# Patient Record
Sex: FEMALE | Race: WHITE | NOT HISPANIC OR LATINO | Employment: OTHER | URBAN - METROPOLITAN AREA
[De-identification: names, ages, dates, MRNs, and addresses within clinical notes are randomized per-mention and may not be internally consistent; named-entity substitution may affect disease eponyms.]

---

## 2017-02-28 ENCOUNTER — OFFICE VISIT (OUTPATIENT)
Dept: FAMILY MEDICINE | Facility: CLINIC | Age: 62
End: 2017-02-28
Payer: COMMERCIAL

## 2017-02-28 ENCOUNTER — RADIANT APPOINTMENT (OUTPATIENT)
Dept: GENERAL RADIOLOGY | Facility: CLINIC | Age: 62
End: 2017-02-28
Attending: FAMILY MEDICINE
Payer: COMMERCIAL

## 2017-02-28 VITALS
DIASTOLIC BLOOD PRESSURE: 77 MMHG | OXYGEN SATURATION: 100 % | WEIGHT: 129 LBS | BODY MASS INDEX: 22.02 KG/M2 | HEART RATE: 79 BPM | HEIGHT: 64 IN | SYSTOLIC BLOOD PRESSURE: 126 MMHG

## 2017-02-28 DIAGNOSIS — M25.552 HIP PAIN, LEFT: Primary | ICD-10-CM

## 2017-02-28 DIAGNOSIS — Z28.21 VACCINATION NOT CARRIED OUT BECAUSE OF PATIENT REFUSAL: ICD-10-CM

## 2017-02-28 DIAGNOSIS — M25.552 HIP PAIN, LEFT: ICD-10-CM

## 2017-02-28 PROCEDURE — 73502 X-RAY EXAM HIP UNI 2-3 VIEWS: CPT

## 2017-02-28 PROCEDURE — 72100 X-RAY EXAM L-S SPINE 2/3 VWS: CPT

## 2017-02-28 PROCEDURE — 99214 OFFICE O/P EST MOD 30 MIN: CPT | Performed by: FAMILY MEDICINE

## 2017-02-28 RX ORDER — NABUMETONE 500 MG/1
1000 TABLET, FILM COATED ORAL
Qty: 60 TABLET | Refills: 1 | Status: SHIPPED | OUTPATIENT
Start: 2017-02-28 | End: 2020-05-20

## 2017-02-28 ASSESSMENT — PAIN SCALES - GENERAL: PAINLEVEL: SEVERE PAIN (7)

## 2017-02-28 NOTE — LETTER
[address] [address]  [phone]             March 1, 2017    Jhoana Benjamin Stemm  [address]  [facility] [address]            Ms. Hernandez,     The radiologist interpreted this differently than I did.  If your hip/thigh pain does not improve much with physical therapy, go ahead and meet with the spine specialist. Enclosed are the results.  Results for orders placed or performed in visit on 02/28/17   XR Lumbar Spine 2/3 Views    Narrative    XR LUMBAR SPINE 2-3 VIEWS 2/28/2017 11:39 AM    COMPARISON: 5/13/2013    HISTORY: Hip pain.      Impression    IMPRESSION: Vertebral heights are preserved without evidence of  fracture. Trace retrolisthesis at L2-3 and L3-4, unchanged. Moderate  disc space loss at L2-3, appear slightly worsened since 5/13/2013.    TAHIR ADORNO       Please contact the clinic if you have additional questions.  Thank you.     Sincerely,       Nelson Camp MD/rosalia

## 2017-02-28 NOTE — NURSING NOTE
"Chief Complaint   Patient presents with     Pain     left hip       Initial /77 (BP Location: Left arm, Patient Position: Chair, Cuff Size: Adult Regular)  Pulse 79  Ht 5' 4\" (1.626 m)  Wt 129 lb (58.5 kg)  SpO2 100%  Breastfeeding? No  BMI 22.14 kg/m2 Estimated body mass index is 22.14 kg/(m^2) as calculated from the following:    Height as of this encounter: 5' 4\" (1.626 m).    Weight as of this encounter: 129 lb (58.5 kg).  Medication Reconciliation: complete   NATASHA Roy MA        "

## 2017-02-28 NOTE — PROGRESS NOTES
SUBJECTIVE:                                                    Jhoana Hernandez is a 62 year old female who presents to clinic today for the following health issues:    Joint Pain     Onset: about 3 weeks    Description:   Location: left hip  Character: Dull ache (constant), some nerve pain (comes and goes)    Intensity: 7/10    Progression of Symptoms: intermittent    Accompanying Signs & Symptoms:  -radiation of pain to lower left leg & foot  -weakness of left leg  -numbness in foot  -Patient denies back pain       History:   Previous similar pain: YES      Precipitating factors:   Trauma or overuse: YES - Just came back from vacation where she was walking frequently. Was sitting on a plane for several hours a couple days ago while traveling home.   -Worsened by sitting, bending, twisting, turning    Alleviating factors:  Improved by: rest/inactivity, standing & walking       Therapies Tried and outcome: Relafen - helps      Past medical, family, and social histories, medications, and allergies are reviewed and updated in Epic.     ROS:  C: NEGATIVE for fever, chills, change in weight  E/M: NEGATIVE for ear, mouth and throat problems  R: NEGATIVE for significant cough or SOB  CV: NEGATIVE for chest pain, palpitations or peripheral edema  MUSCULOSKELETAL: Patient notes that her knee clicking has been improving, but still present. Patient denies pain.   ROS otherwise negative      Any history above obtained by the Medical Assistant was reviewed by Dr. Nelson Camp MD, and edited when necessary.    This document serves as a record of the services and decisions personally performed and made by Dr. Camp. It was created on his behalf by Raquel Meza, a trained medical scribe. The creation of this document is based on the provider's statements to the medical scribe.  Raquel Meza February 28, 2017 11:12 AM   OBJECTIVE:                                                    /77 (BP Location: Left arm,  "Patient Position: Chair, Cuff Size: Adult Regular)  Pulse 79  Ht 5' 4\" (1.626 m)  Wt 129 lb (58.5 kg)  SpO2 100%  Breastfeeding? No  BMI 22.14 kg/m2  Body mass index is 22.14 kg/(m^2).  GENERAL: healthy, alert and no distress  EYES: Eyes grossly normal to inspection, PERRL and conjunctivae and sclerae normal  HENT: ear canals and TM's normal, nose and mouth without ulcers or lesions  MS: no gross musculoskeletal defects noted, no edema. Normal gait, FROM of the left hip joint, and there is no tenderness over the greater trochanter.   SKIN: no suspicious lesions or rashes  NEURO: Normal strength and tone, mentation intact and speech normal, cranial nerves 2-12 intact   PSYCH: mentation appears normal, affect normal/bright      A lumbar X-Ray was ordered. My reading of this film is negative for obvious loss of disc height or foraminal narrowing. She does have scoliosis. (No comparison films available: pending review by Radiologist.)     A left hip X-Ray was ordered. My reading of this film is negative for significant degenerative changes. (No comparison films available: pending review by Radiologist.)      ASSESSMENT/PLAN:                                                    (M25.552) Hip pain, left  (primary encounter diagnosis)  Comment: Lateral and anterior. Unclear etiology. I want her to work with physical therapy for the hip. The patient also requests a referral to a spine specialist regarding recurrent back and neck problems.  Plan: XR Lumbar Spine 2/3 Views, XR Hip Left 2-3         Views, nabumetone (RELAFEN) 500 MG tablet, [facility]         PT, HAND, AND CHIROPRACTIC REFERRAL, ORTHO          REFERRAL        Follow up if symptoms persist past 6 weeks. May need to see an orthopaedist for the hip problem.    (Z28.21) Vaccination not carried out because of patient refusal  Comment: Influenza vaccine offered but declined by the patient.   Plan:       The information in this document, created by the medical scribe " for me, accurately reflects the services I personally performed and the decisions made by me. I have reviewed and approved this document for accuracy prior to leaving the patient care area.  Nelson Cmap MD

## 2017-02-28 NOTE — MR AVS SNAPSHOT
After Visit Summary   2/28/2017    Jhoana Hernandez    MRN: [id number]           Patient Information     Date Of Birth          1955        Visit Information        Provider Department      2/28/2017 11:00 AM Nelson Camp MD [facility]        Today's Diagnoses     Hip pain, left    -  1    Vaccination not carried out because of patient refusal          Care Instructions        ================================================================================  Normal Values   Blood pressure  <140/90 for most adults    <130/80 for some chronic diseases (ask your care team about yours)    BMI (body mass index)  18.5-25 kg/m2 (based on height and weight)     Thank you for visiting [facility]    Normal or non-critical lab and imaging results will be communicated to you by MyChart, letter or phone within 7 days.  If you do not hear from us within 10 days, please call the clinic. If you have a critical or abnormal lab result, we will notify you by phone as soon as possible.     If you have any questions regarding your visit please contact:     Team Comfort:   Clinic Hours Telephone Number   Dr. Nelson Burch 7am-5pm  Monday - Friday [phone]  Jacobo Do RN   Pharmacy 8:00am-8pm Monday-Friday    9am-5pm Saturday-Sunday [phone]   Urgent Care 11am-9pm Monday-Friday        9am-5pm Saturday-Sunday [phone]     After hours, weekend or if you need to make an appointment with your primary provider please call [phone].   After Hours nurse advise: call [facility]: [phone]    Medication Refills:  Call your pharmacy and they will forward the refill to us. Please allow 3 business days for your refills to be completed.                Follow-ups after your visit        Additional Services     [facility] PT, HAND, AND CHIROPRACTIC REFERRAL       **This order  will print in the [facility] Scheduling Office**    Physical Therapy, Hand Therapy and Chiropractic Care are available through:    *[facility]  *[facility]  *[facility]    Call one number to schedule at any of the above locations: [phone].    Your provider has referred you to: Physical Therapy at [facility] or [facility]    Indication/Reason for Referral: Hip Pain  Onset of Illness: 3 weeks ago  Therapy Orders: Evaluate and Treat  Special Programs: None  Special Request: Equipment: As Indicated, Modalities: As Indicated.    Additional Comments for the Therapist or Chiropractor:     Please be aware that coverage of these services is subject to the terms and limitations of your health insurance plan.  Call member services at your health plan with any benefit or coverage questions.      Please bring the following to your appointment:    *Your personal calendar for scheduling future appointments  *Comfortable clothing                  Who to contact     If you have questions or need follow up information about today's clinic visit or your schedule please contact [facility] directly at [phone].  Normal or non-critical lab and imaging results will be communicated to you by MyChart, letter or phone within 4 business days after the clinic has received the results. If you do not hear from us within 7 days, please contact the clinic through [employer] or phone. If you have a critical or abnormal lab result, we will notify you by phone as soon as possible.  Submit refill requests through [employer] or call your pharmacy and they will forward the refill request to us. Please allow 3 business days for your refill to be completed.          Additional Information About Your Visit        [employer] Information     [employer] lets you send messages to your doctor, view your test results, renew your prescriptions, schedule appointments and more. To sign up, go to  "[email or web address] . Click on \"Log in\" on the left side of the screen, which will take you to the Welcome page. Then click on \"Sign up Now\" on the right side of the page.     You will be asked to enter the access code listed below, as well as some personal information. Please follow the directions to create your username and password.     Your access code is: [email or web address]  Expires: 2017 11:46 AM     Your access code will  in 90 days. If you need help or a new code, please call your [facility] clinic or [phone].        Care EveryWhere ID     This is your Care EveryWhere ID. This could be used by other organizations to access your [facility] medical records  [id number]        Your Vitals Were     Pulse Height Pulse Oximetry Breastfeeding? BMI (Body Mass Index)       79 5' 4\" (1.626 m) 100% No 22.14 kg/m2        Blood Pressure from Last 3 Encounters:   17 126/77   16 133/79   10/07/16 127/82    Weight from Last 3 Encounters:   17 129 lb (58.5 kg)   16 128 lb (58.1 kg)   10/07/16 125 lb (56.7 kg)              We Performed the Following     [facility] PT, HAND, AND CHIROPRACTIC REFERRAL          Today's Medication Changes          These changes are accurate as of: 17 11:46 AM.  If you have any questions, ask your nurse or doctor.               Start taking these medicines.        Dose/Directions    nabumetone 500 MG tablet   Commonly known as:  RELAFEN   Used for:  Hip pain, left   Started by:  Nelson Camp MD        Dose:  1000 mg   Take 2 tablets (1,000 mg) by mouth daily with food for hip pain.   Quantity:  60 tablet   Refills:  1            Where to get your medicines      These medications were sent to [id number] - [place], [place] - [address]  [address] [address]     Phone:  [phone]     nabumetone 500 MG tablet                Primary Care Provider Office Phone # Fax #    Nelson Camp [phone]       " [facility] [address]  [facility] [address]        Thank you!     Thank you for choosing [facility]  for your care. Our goal is always to provide you with excellent care. Hearing back from our patients is one way we can continue to improve our services. Please take a few minutes to complete the written survey that you may receive in the mail after your visit with us. Thank you!             Your Updated Medication List - Protect others around you: Learn how to safely use, store and throw away your medicines at www.disposemymeds.org.          This list is accurate as of: 2/28/17 11:46 AM.  Always use your most recent med list.                   Brand Name Dispense Instructions for use    albuterol 108 (90 BASE) MCG/ACT Inhaler    PROAIR HFA/PROVENTIL HFA/VENTOLIN HFA    1 Inhaler    Inhale 1 puff into the lungs every 6 hours as needed for shortness of breath / dyspnea or wheezing       BENADRYL ALLERGY PO      AS NEEDED FOR ALLERGIES       CLARITIN PO      AS NEEDED FOR ALLERGIES       conjugated estrogens cream    PREMARIN    30 g    Place 1 g vaginally twice a week       EPINEPHrine 0.3 MG/0.3ML injection     2 each    Inject 0.3 mLs (0.3 mg) into the muscle once as needed       mometasone 50 MCG/ACT spray    NASONEX    1 Box    Spray 2 sprays into both nostrils daily for allergy prevention.       nabumetone 500 MG tablet    RELAFEN    60 tablet    Take 2 tablets (1,000 mg) by mouth daily with food for hip pain.       prednisoLONE acetate 1 % ophthalmic susp    PRED FORTE    1 Bottle    4 drops into both ears twice weekly

## 2017-02-28 NOTE — PATIENT INSTRUCTIONS
================================================================================  Normal Values   Blood pressure  <140/90 for most adults    <130/80 for some chronic diseases (ask your care team about yours)    BMI (body mass index)  18.5-25 kg/m2 (based on height and weight)     Thank you for visiting [facility]    Normal or non-critical lab and imaging results will be communicated to you by MyChart, letter or phone within 7 days.  If you do not hear from us within 10 days, please call the clinic. If you have a critical or abnormal lab result, we will notify you by phone as soon as possible.     If you have any questions regarding your visit please contact:     Team Comfort:   Clinic Hours Telephone Number   Dr. Nelson Burch 7am-5pm  Monday - Friday [phone]  Jacobo RN  Tova Do RN   Pharmacy 8:00am-8pm Monday-Friday    9am-5pm Saturday-Sunday [phone]   Urgent Care 11am-9pm Monday-Friday        9am-5pm Saturday-Sunday [phone]     After hours, weekend or if you need to make an appointment with your primary provider please call [phone].   After Hours nurse advise: call [facility]: [phone]    Medication Refills:  Call your pharmacy and they will forward the refill to us. Please allow 3 business days for your refills to be completed.

## 2017-03-09 ENCOUNTER — OFFICE VISIT (OUTPATIENT)
Dept: ORTHOPEDICS | Facility: CLINIC | Age: 62
End: 2017-03-09
Payer: COMMERCIAL

## 2017-03-09 VITALS — OXYGEN SATURATION: 98 % | HEART RATE: 71 BPM | DIASTOLIC BLOOD PRESSURE: 80 MMHG | SYSTOLIC BLOOD PRESSURE: 130 MMHG

## 2017-03-09 DIAGNOSIS — M54.16 LUMBAR RADICULOPATHY: Primary | ICD-10-CM

## 2017-03-09 PROCEDURE — 99213 OFFICE O/P EST LOW 20 MIN: CPT | Performed by: FAMILY MEDICINE

## 2017-03-09 ASSESSMENT — PAIN SCALES - GENERAL: PAINLEVEL: SEVERE PAIN (7)

## 2017-03-09 NOTE — MR AVS SNAPSHOT
After Visit Summary   3/9/2017    Jhoana Hernandez    MRN: [id number]           Patient Information     Date Of Birth          1955        Visit Information        Provider Department      3/9/2017 9:20 AM Georgi Coto, DO [facility]        Today's Diagnoses     Lumbar radiculopathy    -  1      Care Instructions    Thanks for coming today.  Ortho/Sports Medicine Clinic  [address] [place] [address]    To schedule future appointments in Ortho Clinic, you may call [phone].    To schedule ordered imaging by your provider:   Call [facility] [phone]    To schedule an injection ordered by your provider:  Call [facility] Injection scheduling line: [phone]  MyChart available online at:  [email or web address]    Please call if any further questions or concerns ([phone]).  Clinic hours 8 am to 5 pm.    Return to clinic (call) if symptoms worsen or fail to improve.          Follow-ups after your visit        Your next 10 appointments already scheduled     Mar 10, 2017  7:30 AM CST   [facility]ELA Extremity with Feliciano Burden, PT   [facility]  )    [address]  [place] [address] [phone]            Mar 17, 2017  7:30 AM CDT   CARMELA Extremity with Feliciano Burden PT   [facility]    [address]  [place] [address] [phone]              Who to contact     If you have questions or need follow up information about today's clinic visit or your schedule please contact [facility] directly at [phone].  Normal or non-critical lab and imaging results will be communicated to you by MyChart, letter or phone within 4 business days after the clinic has received the results. If you do not hear from us within 7 days, please contact the clinic through MyChart or phone. If you  have a critical or abnormal lab result, we will notify you by phone as soon as possible.  Submit refill requests through [employer] or call your pharmacy and they will forward the refill request to us. Please allow 3 business days for your refill to be completed.          Additional Information About Your Visit        [employer] Information     [employer] is an electronic gateway that provides easy, online access to your medical records. With [employer], you can request a clinic appointment, read your test results, renew a prescription or communicate with your care team.     To sign up for [employer] visit the website at [email or web address]   You will be asked to enter the access code listed below, as well as some personal information. Please follow the directions to create your username and password.     Your access code is: [email or web address]  Expires: 2017 11:46 AM     Your access code will  in 90 days. If you need help or a new code, please contact your [facility] or call [phone] for assistance.        Care EveryWhere ID     This is your Care EveryWhere ID. This could be used by other organizations to access your [facility] medical records  [id number]        Your Vitals Were     Pulse Pulse Oximetry                71 98%           Blood Pressure from Last 3 Encounters:   17 130/80   17 126/77   16 133/79    Weight from Last 3 Encounters:   17 58.5 kg (129 lb)   16 58.1 kg (128 lb)   10/07/16 56.7 kg (125 lb)              Today, you had the following     No orders found for display       Primary Care Provider Office Phone # Fax #    Nelson Camp [phone]       [facility] [address]  [facility] [address]        Thank you!     Thank you for choosing [facility]  for your care. Our goal is always to provide you with excellent care. Hearing back from our patients is one way we can continue to  improve our services. Please take a few minutes to complete the written survey that you may receive in the mail after your visit with us. Thank you!             Your Updated Medication List - Protect others around you: Learn how to safely use, store and throw away your medicines at www.disposemymeds.org.          This list is accurate as of: 3/9/17  9:56 AM.  Always use your most recent med list.                   Brand Name Dispense Instructions for use    albuterol 108 (90 BASE) MCG/ACT Inhaler    PROAIR HFA/PROVENTIL HFA/VENTOLIN HFA    1 Inhaler    Inhale 1 puff into the lungs every 6 hours as needed for shortness of breath / dyspnea or wheezing       BENADRYL ALLERGY PO      AS NEEDED FOR ALLERGIES       CLARITIN PO      AS NEEDED FOR ALLERGIES       conjugated estrogens cream    PREMARIN    30 g    Place 1 g vaginally twice a week       EPINEPHrine 0.3 MG/0.3ML injection     2 each    Inject 0.3 mLs (0.3 mg) into the muscle once as needed       mometasone 50 MCG/ACT spray    NASONEX    1 Box    Spray 2 sprays into both nostrils daily for allergy prevention.       nabumetone 500 MG tablet    RELAFEN    60 tablet    Take 2 tablets (1,000 mg) by mouth daily with food for hip pain.       prednisoLONE acetate 1 % ophthalmic susp    PRED FORTE    1 Bottle    4 drops into both ears twice weekly

## 2017-03-09 NOTE — NURSING NOTE
"Jhoana Benjamin Sukhwinderarun's goals for this visit include: Find a solution for lumbar, left hip and leg pain.   She requests these members of her care team be copied on today's visit information: yes    PCP: Nelson Camp    Referring Provider:  No referring provider defined for this encounter.    Chief Complaint   Patient presents with     Consult     Pain x 1 month. No known injury.     Hip left     Pain goes down to her toes on the left side and would be numb and tingling.      Lumbar/SI problem     Knee left     No pain but has been \"clicking\"       Initial /80 (BP Location: Right arm, Patient Position: Chair, Cuff Size: Adult Regular)  Pulse 71  SpO2 98% Estimated body mass index is 22.14 kg/(m^2) as calculated from the following:    Height as of 2/28/17: 1.626 m (5' 4\").    Weight as of 2/28/17: 58.5 kg (129 lb).  Medication Reconciliation: complete    "

## 2017-03-09 NOTE — PROGRESS NOTES
HISTORY OF PRESENT ILLNESS  Ms. Hernandez is a pleasant 62 year old year old female who presents to clinic today with left sided hip and leg pain.  She's seen at the request of Dr. Camp.  Shakira explains that her pain started many years ago insidiously.  Left sided leg pain that radiates down to her left foot down the front of her leg.  Aching, numbness, tingling in her left leg, worse with sitting, leaning forward, standing, better with walking.  Favors the leg at times.  Hasn't been to PT yet but is set up for tomorrow morning.  Relafen has helped her somewhat.    Severity: moderate    Timing: occurs intermittently  Associated signs & symptoms: none  Previous similar pain: yes  Additional history: as documented    MEDICAL HISTORY  Patient Active Problem List   Diagnosis     CARDIOVASCULAR SCREENING; LDL GOAL LESS THAN 160     Lactose intolerance     Seasonal allergic rhinitis     Allergy, food     Advanced directives, counseling/discussion     Subclinical hypothyroidism     Osteopenia     Hollenhorst plaque, left eye       Current Outpatient Prescriptions   Medication Sig Dispense Refill     nabumetone (RELAFEN) 500 MG tablet Take 2 tablets (1,000 mg) by mouth daily with food for hip pain. 60 tablet 1     conjugated estrogens (PREMARIN) vaginal cream Place 1 g vaginally twice a week 30 g 3     EPINEPHrine (EPIPEN) 0.3 MG/0.3ML injection Inject 0.3 mLs (0.3 mg) into the muscle once as needed 2 each 3     mometasone (NASONEX) 50 MCG/ACT nasal spray Spray 2 sprays into both nostrils daily for allergy prevention. 1 Box 3     albuterol (PROAIR HFA, PROVENTIL HFA, VENTOLIN HFA) 108 (90 BASE) MCG/ACT inhaler Inhale 1 puff into the lungs every 6 hours as needed for shortness of breath / dyspnea or wheezing 1 Inhaler 0     prednisoLONE acetate (PRED FORTE) 1 % ophthalmic suspension 4 drops into both ears twice weekly 1 Bottle 5     CLARITIN OR AS NEEDED FOR ALLERGIES       BENADRYL ALLERGY OR AS NEEDED FOR ALLERGIES          Allergies   Allergen Reactions     Aloe Vera      Fish      Fluticasone      Ibuprofen Sodium      Macrodantin [Nitrofuran Derivatives]      Mineral Oil      Nuts      Seafood      Shellfish Allergy      Sulfa Drugs      Yellow Dye        Family History   Problem Relation Age of Onset     Asthma Sister      DIABETES Sister      Type II (questionable)     Breast Cancer Sister 60     Lipids Mother      CEREBROVASCULAR DISEASE Father      Thyroid Disease Other      C.A.D. No family hx of      Hypertension No family hx of      Cancer - colorectal No family hx of      Prostate Cancer No family hx of        Additional medical/Social/Surgical histories reviewed in [facility] and updated as appropriate.     REVIEW OF SYSTEMS (3/9/2017)  10 point ROS of systems including Constitutional, Eyes, Respiratory, Cardiovascular, Gastroenterology, Genitourinary, Integumentary, Musculoskeletal, Psychiatric were all negative except for pertinent positives noted in my HPI.     PHYSICAL EXAM  Vitals:    03/09/17 0926   BP: 130/80   BP Location: Right arm   Patient Position: Chair   Cuff Size: Adult Regular   Pulse: 71   SpO2: 98%     General  - normal appearance, in no obvious distress  CV  - normal peripheral perfusion  Pulm  - normal respiratory pattern, non-labored  Musculoskeletal - lumbar spine  - stance: normal gait without limp, no obvious leg length discrepancy, normal heel and toe walk  - inspection: normal bone and joint alignment, no obvious scoliosis  - palpation: no paravertebral or bony tenderness  - ROM: flexion exacerbates pain, normal extension, sidebending, rotation  - strength: lower extremities 5/5 in all planes  - special tests:  (+) straight leg raise  (+) slump test  Neuro  - patellar and Achilles DTRs 2+ bilaterally, lower extremity sensory deficit throughout L5 distribution, grossly normal coordination, normal muscle tone  Skin  - no ecchymosis, erythema, warmth, or induration, no obvious rash  Psych  -  interactive, appropriate, normal mood and affect        ASSESSMENT & PLAN  Ms. Hernandez is a 62 year old year old female who is in the office today with lumbar radiculopathy.    I reviewed her lumbar xray in the room with her which reads:    IMPRESSION: Vertebral heights are preserved without evidence of  fracture. Trace retrolisthesis at L2-3 and L3-4, unchanged. Moderate  disc space loss at L2-3, appear slightly worsened since 5/13/2013.    Jhoana and I had a good discussion regarding non-operative treatment for degenerative disc disease.  This included strengthening of the paraspinal and core muscles, weight control, and oral analgesics when needed.  We also discussed the role of epidural corticosteroid injections.    If no better after PT we could consider an MR in attempt to facilitate a possible lumbar epidural spinal injection.    Jhoana will follow up in 4-6 weeks.    It was a pleasure taking care of Jhoana.        Georgi Coto DO, CAM

## 2017-03-10 ENCOUNTER — THERAPY VISIT (OUTPATIENT)
Dept: PHYSICAL THERAPY | Facility: CLINIC | Age: 62
End: 2017-03-10
Payer: COMMERCIAL

## 2017-03-10 DIAGNOSIS — M54.16 LUMBAR RADICULAR PAIN: ICD-10-CM

## 2017-03-10 DIAGNOSIS — M25.552 HIP PAIN, LEFT: Primary | ICD-10-CM

## 2017-03-10 PROCEDURE — 97110 THERAPEUTIC EXERCISES: CPT | Mod: GP | Performed by: PHYSICAL THERAPIST

## 2017-03-10 PROCEDURE — 97112 NEUROMUSCULAR REEDUCATION: CPT | Mod: GP | Performed by: PHYSICAL THERAPIST

## 2017-03-10 PROCEDURE — 97161 PT EVAL LOW COMPLEX 20 MIN: CPT | Mod: GP | Performed by: PHYSICAL THERAPIST

## 2017-03-10 ASSESSMENT — ACTIVITIES OF DAILY LIVING (ADL)
LIGHT_TO_MODERATE_WORK: MODERATE DIFFICULTY
HOS_ADL_ITEM_SCORE_TOTAL: 35
HOS_ADL_HIGHEST_POTENTIAL_SCORE: 64
WALKING_UP_STEEP_HILLS: MODERATE DIFFICULTY
RECREATIONAL_ACTIVITIES: MODERATE DIFFICULTY
HEAVY_WORK: MODERATE DIFFICULTY
PUTTING_ON_SOCKS_AND_SHOES: SLIGHT DIFFICULTY
GOING_DOWN_1_FLIGHT_OF_STAIRS: MODERATE DIFFICULTY
HOS_ADL_SCORE(%): 54.69
GETTING_INTO_AND_OUT_OF_AN_AVERAGE_CAR: SLIGHT DIFFICULTY
SITTING_FOR_15_MINUTES: MODERATE DIFFICULTY
HOS_ADL_COUNT: 16
WALKING_INITIALLY: MODERATE DIFFICULTY
STANDING_FOR_15_MINUTES: MODERATE DIFFICULTY
WALKING_15_MINUTES_OR_GREATER: MODERATE DIFFICULTY
TWISTING/PIVOTING_ON_INVOLVED_LEG: MODERATE DIFFICULTY
DEEP_SQUATTING: MODERATE DIFFICULTY
STEPPING_UP_AND_DOWN_CURBS: SLIGHT DIFFICULTY
GETTING_INTO_AND_OUT_OF_A_BATHTUB: SLIGHT DIFFICULTY
HOW_WOULD_YOU_RATE_YOUR_CURRENT_LEVEL_OF_FUNCTION_DURING_YOUR_USUAL_ACTIVITIES_OF_DAILY_LIVING_FROM_0_TO_100_WITH_100_BEING_YOUR_LEVEL_OF_FUNCTION_PRIOR_TO_YOUR_HIP_PROBLEM_AND_0_BEING_THE_INABILITY_TO_PERFORM_ANY_OF_YOUR_USUAL_DAILY_ACTIVITIES?: 70
WALKING_APPROXIMATELY_10_MINUTES: MODERATE DIFFICULTY
ROLLING_OVER_IN_BED: MODERATE DIFFICULTY
GOING_UP_1_FLIGHT_OF_STAIRS: MODERATE DIFFICULTY

## 2017-03-10 NOTE — PROGRESS NOTES
[facility] Initial Evaluation      Subjective:    Jhoana Hernandez is a 62 year old female with a lumbar condition.    Condition occurred: for unknown reasons.  This is a recurrent, chronic and new condition  January 2017.    Patient reports pain:  Lumbar spine left.  Radiates to:  Gluteals left, lower leg left and foot left.  Pain is described as aching and is intermittent and reported as 8/10.  Associated symptoms:  Numbness and tingling. Pain is worse in the A.M. and worse during the night.  Symptoms are exacerbated by lying down, bending, lifting and sitting (LEFT SIDE LYING, STAIRS) and relieved by rest and NSAID's.  Pain course: VARIES.  Special tests:  X-ray (L HIP. ).  Previous treatment: NONE.    General health as reported by patient is good.  Past medical history: ALLERGIES.  Medical allergies: yes.  Other surgeries include:  Other.  Current medications:  Anti-inflammatory.  Current occupation is SALES.  Patient is working in normal job without restrictions.  Primary job tasks include:  Repetitive tasks, lifting and prolonged standing.    Barriers: STAIRS AT HOME.     Red flags:  Bilateral numbness (HANDS. ).                      Objective:    System         Lumbar/SI Evaluation    Lumbar Myotomes:    T12-L3 (Hip Flex):  Left: 5    Right: 5  L2-4 (Quads):  Left:  5    Right:  5  L4 (Ankle DF):  Left:  5    Right:  5  L5 (Great Toe Ext): Left: 5    Right: 5   S1 (Toe Raise):  Left: 5-    Right: 5-  Lumbar DTR's:  normal                                                                   Kari Lumbar Evaluation    Posture:  Sitting: poor        Correction of Posture: worse    Movement Loss:  Flexion (Flex): nil and pain  Extension (EXT): pain  Side Glide R (SG R): nil  Side Glide L (SG L): nil  Test Movements:  FIS: During: increases      EIS:   Repeat EIS: During: centralizing      EIL: During: centralizing and decreases    Repeat EIL: During: decreases          Conclusion:  derangement  Principle of Treatment:  Posture Correction: IN SITTING WITH TOWEL ROLL.     Extension: EIS / EIL, NEEMA.     Other: NEUTRAL SPINE, THER EX, THER ACT, NMR, MANUAL THERAPY, Tx.                                        ROS    Assessment/Plan:      Patient is a 62 year old female with lumbar complaints.    Patient has the following significant findings with corresponding treatment plan.                Diagnosis 1:  HIP PAIN LEFT, LUMBAR RADICULAR PAIN  Pain -  hot/cold therapy, US, electric stimulation, mechanical traction, manual therapy, splint/taping/bracing/orthotics, self management, education, directional preference exercise and home program  Decreased function - therapeutic activities and home program  Impaired posture - neuro re-education, therapeutic activities and home program    Therapy Evaluation Codes:   1) History comprised of:   Personal factors that impact the plan of care:      None.    Comorbidity factors that impact the plan of care are:      None.     Medications impacting care: None.  2) Examination of Body Systems comprised of:   Body structures and functions that impact the plan of care:      Lumbar spine.   Activity limitations that impact the plan of care are:      Bending, Cooking, Dressing, Lifting, Sitting, Squatting/kneeling, Stairs, Standing and Working.  3) Clinical presentation characteristics are:   Stable/Uncomplicated.  4) Decision-Making    Low complexity using standardized patient assessment instrument and/or measureable assessment of functional outcome.  Cumulative Therapy Evaluation is: Low complexity.    Previous and current functional limitations:  (See Goal Flow Sheet for this information)    Short term and Long term goals: (See Goal Flow Sheet for this information)     Communication ability:  Patient appears to be able to clearly communicate and understand verbal and written communication and follow directions correctly.  Treatment Explanation - The following has been  discussed with the patient:   RX ordered/plan of care  Anticipated outcomes  Possible risks and side effects  This patient would benefit from PT intervention to resume normal activities.   Rehab potential is fair.    Frequency:  1 X week, once daily  Duration:  for 6 weeks  Discharge Plan:  Achieve all LTG.  Independent in home treatment program.  Reach maximal therapeutic benefit.    Please refer to the daily flowsheet for treatment today, total treatment time and time spent performing 1:1 timed codes.

## 2017-03-10 NOTE — MR AVS SNAPSHOT
"              After Visit Summary   3/10/2017    Jhoana Hernandez    MRN: [id number]           Patient Information     Date Of Birth          1955        Visit Information        Provider Department      3/10/2017 7:30 AM Feliciano Burden, PT [facility]        Today's Diagnoses     Hip pain, left    -  1    Lumbar radicular pain           Follow-ups after your visit        Your next 10 appointments already scheduled     Mar 17, 2017  9:30 AM CDT   CARMELA Extremity with Feliciano Burden PT   [facility] ([facility]  )    [address]  [facility] [address]   [phone]            Mar 24, 2017  8:50 AM CDT   CARMELA Extremity with Feliciano Burden PT   [facility] ([facility]  )    [address]  [facility] [address]   [phone]              Who to contact     If you have questions or need follow up information about today's clinic visit or your schedule please contact [facility] directly at [phone].  Normal or non-critical lab and imaging results will be communicated to you by MyChart, letter or phone within 4 business days after the clinic has received the results. If you do not hear from us within 7 days, please contact the clinic through [employer] or phone. If you have a critical or abnormal lab result, we will notify you by phone as soon as possible.  Submit refill requests through [employer] or call your pharmacy and they will forward the refill request to us. Please allow 3 business days for your refill to be completed.          Additional Information About Your Visit        MyChart Information     [employer] lets you send messages to your doctor, view your test results, renew your prescriptions, schedule appointments and more. To sign up, go to [email or web address] . Click on \"Log in\" on the left side of the screen, which will " "take you to the Welcome page. Then click on \"Sign up Now\" on the right side of the page.     You will be asked to enter the access code listed below, as well as some personal information. Please follow the directions to create your username and password.     Your access code is: [email or web address]  Expires: 2017 11:46 AM     Your access code will  in 90 days. If you need help or a new code, please call your [place] clinic or [phone].        Care EveryWhere ID     This is your Care EveryWhere ID. This could be used by other organizations to access your [place] medical records  [id number]         Blood Pressure from Last 3 Encounters:   17 130/80   17 126/77   16 133/79    Weight from Last 3 Encounters:   17 58.5 kg (129 lb)   16 58.1 kg (128 lb)   10/07/16 56.7 kg (125 lb)              We Performed the Following     CARMELA Inital Eval Report     Neuromuscular Re-Education     PT Eval, Low Complexity (70932)     Therapeutic Exercises        Primary Care Provider Office Phone # Fax #    Nelson Camp [phone]       [facility] [address] [facility] [address]        Thank you!     Thank you for choosing [facility]  for your care. Our goal is always to provide you with excellent care. Hearing back from our patients is one way we can continue to improve our services. Please take a few minutes to complete the written survey that you may receive in the mail after your visit with us. Thank you!             Your Updated Medication List - Protect others around you: Learn how to safely use, store and throw away your medicines at www.disposemymeds.org.          This list is accurate as of: 3/10/17 10:14 AM.  Always use your most recent med list.                   Brand Name Dispense Instructions for use    albuterol 108 (90 BASE) MCG/ACT Inhaler    PROAIR HFA/PROVENTIL HFA/VENTOLIN HFA    1 Inhaler    Inhale 1 " puff into the lungs every 6 hours as needed for shortness of breath / dyspnea or wheezing       BENADRYL ALLERGY PO      AS NEEDED FOR ALLERGIES       CLARITIN PO      AS NEEDED FOR ALLERGIES       conjugated estrogens cream    PREMARIN    30 g    Place 1 g vaginally twice a week       EPINEPHrine 0.3 MG/0.3ML injection     2 each    Inject 0.3 mLs (0.3 mg) into the muscle once as needed       mometasone 50 MCG/ACT spray    NASONEX    1 Box    Spray 2 sprays into both nostrils daily for allergy prevention.       nabumetone 500 MG tablet    RELAFEN    60 tablet    Take 2 tablets (1,000 mg) by mouth daily with food for hip pain.       prednisoLONE acetate 1 % ophthalmic susp    PRED FORTE    1 Bottle    4 drops into both ears twice weekly

## 2017-03-17 ENCOUNTER — THERAPY VISIT (OUTPATIENT)
Dept: PHYSICAL THERAPY | Facility: CLINIC | Age: 62
End: 2017-03-17
Payer: COMMERCIAL

## 2017-03-17 DIAGNOSIS — M25.552 HIP PAIN, LEFT: ICD-10-CM

## 2017-03-17 DIAGNOSIS — M54.16 LUMBAR RADICULAR PAIN: ICD-10-CM

## 2017-03-17 PROCEDURE — 97530 THERAPEUTIC ACTIVITIES: CPT | Mod: GP

## 2017-03-17 PROCEDURE — 97110 THERAPEUTIC EXERCISES: CPT | Mod: GP

## 2017-03-17 PROCEDURE — 97140 MANUAL THERAPY 1/> REGIONS: CPT | Mod: GP

## 2017-03-24 ENCOUNTER — THERAPY VISIT (OUTPATIENT)
Dept: PHYSICAL THERAPY | Facility: CLINIC | Age: 62
End: 2017-03-24
Payer: COMMERCIAL

## 2017-03-24 DIAGNOSIS — M25.552 HIP PAIN, LEFT: ICD-10-CM

## 2017-03-24 DIAGNOSIS — M54.16 LUMBAR RADICULAR PAIN: ICD-10-CM

## 2017-03-24 PROCEDURE — 97530 THERAPEUTIC ACTIVITIES: CPT | Mod: GP

## 2017-03-24 PROCEDURE — 97110 THERAPEUTIC EXERCISES: CPT | Mod: GP

## 2017-03-30 ENCOUNTER — THERAPY VISIT (OUTPATIENT)
Dept: PHYSICAL THERAPY | Facility: CLINIC | Age: 62
End: 2017-03-30
Payer: COMMERCIAL

## 2017-03-30 DIAGNOSIS — M54.16 LUMBAR RADICULAR PAIN: ICD-10-CM

## 2017-03-30 DIAGNOSIS — M25.552 HIP PAIN, LEFT: ICD-10-CM

## 2017-03-30 PROCEDURE — 97110 THERAPEUTIC EXERCISES: CPT | Mod: GP | Performed by: PHYSICAL THERAPIST

## 2017-03-30 PROCEDURE — 97140 MANUAL THERAPY 1/> REGIONS: CPT | Mod: GP | Performed by: PHYSICAL THERAPIST

## 2017-04-07 ENCOUNTER — THERAPY VISIT (OUTPATIENT)
Dept: PHYSICAL THERAPY | Facility: CLINIC | Age: 62
End: 2017-04-07
Payer: COMMERCIAL

## 2017-04-07 DIAGNOSIS — M25.552 HIP PAIN, LEFT: ICD-10-CM

## 2017-04-07 DIAGNOSIS — M54.16 LUMBAR RADICULAR PAIN: ICD-10-CM

## 2017-04-07 PROCEDURE — 97112 NEUROMUSCULAR REEDUCATION: CPT | Mod: GP | Performed by: PHYSICAL THERAPIST

## 2017-04-07 PROCEDURE — 97530 THERAPEUTIC ACTIVITIES: CPT | Mod: GP | Performed by: PHYSICAL THERAPIST

## 2017-04-07 PROCEDURE — 97110 THERAPEUTIC EXERCISES: CPT | Mod: GP | Performed by: PHYSICAL THERAPIST

## 2017-04-24 ENCOUNTER — THERAPY VISIT (OUTPATIENT)
Dept: PHYSICAL THERAPY | Facility: CLINIC | Age: 62
End: 2017-04-24
Payer: COMMERCIAL

## 2017-04-24 DIAGNOSIS — M54.16 LUMBAR RADICULAR PAIN: ICD-10-CM

## 2017-04-24 DIAGNOSIS — M25.552 HIP PAIN, LEFT: ICD-10-CM

## 2017-04-24 PROCEDURE — 97110 THERAPEUTIC EXERCISES: CPT | Mod: GP | Performed by: PHYSICAL THERAPIST

## 2017-04-24 PROCEDURE — 97112 NEUROMUSCULAR REEDUCATION: CPT | Mod: GP | Performed by: PHYSICAL THERAPIST

## 2017-04-24 NOTE — MR AVS SNAPSHOT
"              After Visit Summary   2017    Jhoana Hernandez    MRN: [id number]           Patient Information     Date Of Birth          1955        Visit Information        Provider Department      2017 8:50 AM Feliciano Burden PT [facility]        Today's Diagnoses     Hip pain, left        Lumbar radicular pain           Follow-ups after your visit        Who to contact     If you have questions or need follow up information about today's clinic visit or your schedule please contact [facility] directly at [phone].  Normal or non-critical lab and imaging results will be communicated to you by MyChart, letter or phone within 4 business days after the clinic has received the results. If you do not hear from us within 7 days, please contact the clinic through [employer] or phone. If you have a critical or abnormal lab result, we will notify you by phone as soon as possible.  Submit refill requests through [employer] or call your pharmacy and they will forward the refill request to us. Please allow 3 business days for your refill to be completed.          Additional Information About Your Visit        MyChart Information     [employer] lets you send messages to your doctor, view your test results, renew your prescriptions, schedule appointments and more. To sign up, go to [email or web address] . Click on \"Log in\" on the left side of the screen, which will take you to the Welcome page. Then click on \"Sign up Now\" on the right side of the page.     You will be asked to enter the access code listed below, as well as some personal information. Please follow the directions to create your username and password.     Your access code is: [email or web address]  Expires: 2017 12:46 PM     Your access code will  in 90 days. If you need help or a new code, please call your [place] clinic or [phone].        Care EveryWhere ID     This " is your Care EveryWhere ID. This could be used by other organizations to access your [facility] medical records  [id number]         Blood Pressure from Last 3 Encounters:   03/09/17 130/80   02/28/17 126/77   12/05/16 133/79    Weight from Last 3 Encounters:   02/28/17 58.5 kg (129 lb)   12/05/16 58.1 kg (128 lb)   10/07/16 56.7 kg (125 lb)              We Performed the Following     CARMELA Progress Notes Report     Neuromuscular Re-Education     Therapeutic Exercises        Primary Care Provider Office Phone # Fax #    Nelson Camp [phone]       [facility] [address] [place]  [facility] [address]        Thank you!     Thank you for choosing [facility]  for your care. Our goal is always to provide you with excellent care. Hearing back from our patients is one way we can continue to improve our services. Please take a few minutes to complete the written survey that you may receive in the mail after your visit with us. Thank you!             Your Updated Medication List - Protect others around you: Learn how to safely use, store and throw away your medicines at www.disposemymeds.org.          This list is accurate as of: 4/24/17 11:59 PM.  Always use your most recent med list.                   Brand Name Dispense Instructions for use    albuterol 108 (90 BASE) MCG/ACT Inhaler    PROAIR HFA/PROVENTIL HFA/VENTOLIN HFA    1 Inhaler    Inhale 1 puff into the lungs every 6 hours as needed for shortness of breath / dyspnea or wheezing       BENADRYL ALLERGY PO      AS NEEDED FOR ALLERGIES       CLARITIN PO      AS NEEDED FOR ALLERGIES       conjugated estrogens cream    PREMARIN    30 g    Place 1 g vaginally twice a week       EPINEPHrine 0.3 MG/0.3ML injection     2 each    Inject 0.3 mLs (0.3 mg) into the muscle once as needed       mometasone 50 MCG/ACT spray    NASONEX    1 Box    Spray 2 sprays into both nostrils daily for allergy prevention.        nabumetone 500 MG tablet    RELAFEN    60 tablet    Take 2 tablets (1,000 mg) by mouth daily with food for hip pain.       prednisoLONE acetate 1 % ophthalmic susp    PRED FORTE    1 Bottle    4 drops into both ears twice weekly

## 2017-04-25 PROBLEM — M54.16 LUMBAR RADICULAR PAIN: Status: RESOLVED | Noted: 2017-03-10 | Resolved: 2017-04-25

## 2017-04-25 PROBLEM — M25.552 HIP PAIN, LEFT: Status: RESOLVED | Noted: 2017-03-10 | Resolved: 2017-04-25

## 2017-04-25 NOTE — PROGRESS NOTES
Subjective:    HPI  Oswestry Score: 8 %                 Objective:    System    Physical Exam    General     ROS    Assessment/Plan:      DISCHARGE REPORT    Progress reporting period is from 3/10/2017 to 4/24/2017.     SUBJECTIVE  Subjective: BETTER OVER-ALL. DID HAVE A DAY OF L L/E SXS 5 -7 DAYS AGO.   INTERMITTENT TINGLING IN L L/E.   80-90% BETTER THAN INITIAL.    Current Pain level: 0/10   Initial Pain level: 8/10   Changes in function: Yes, see goal flow sheet for change in function    ;   ,     The subjective and objective information are from the last SOAP note on this patient.    OBJECTIVE  Objective:  FIS: TO FLOOR PAIN FREE.  EIS: LOREE: TINGLING L L/E,   NEGATIVE SLR AND SLUMP TEST.   NEEMA: TINGLING L L/E,   REIL: TINGLING L L/E.   RFIL: NEGATIVE.       ASSESSMENT/PLAN  Updated problem list and treatment plan: Diagnosis 1:  HIP PAIN LEFT, LUMBAR RADICULAR PAIN  Pain -  hot/cold therapy, US, electric stimulation, manual therapy, splint/taping/bracing/orthotics, self management, education, directional preference exercise and home program  Decreased function - therapeutic activities and home program  Impaired posture - neuro re-education, therapeutic activities and home program  STG/LTGs have been met or progress has been made towards goals:  Yes (See Goal flow sheet completed today.)  Assessment of Progress: The patient's condition is improving.  Self Management Plans:  Patient has been instructed in a home treatment program.  I have re-evaluated this patient and find that the nature, scope, duration and intensity of the therapy is appropriate for the medical condition of the patient.  Jhoana continues to require the following intervention to meet STG and LTG's: PT intervention is no longer required to meet STG/LTG.  We will discharge this patient from PT.    Recommendations:  This patient would benefit from further evaluation.    Please refer to the daily flowsheet for treatment today, total treatment time  and time spent performing 1:1 timed codes.

## 2018-02-06 ENCOUNTER — OFFICE VISIT (OUTPATIENT)
Dept: OTOLARYNGOLOGY | Facility: CLINIC | Age: 63
End: 2018-02-06
Payer: COMMERCIAL

## 2018-02-06 VITALS — TEMPERATURE: 98 F | BODY MASS INDEX: 25.1 KG/M2 | WEIGHT: 147 LBS | HEIGHT: 64 IN

## 2018-02-06 DIAGNOSIS — H60.8X3 CHRONIC ECZEMATOUS OTITIS EXTERNA OF BOTH EARS: Primary | ICD-10-CM

## 2018-02-06 DIAGNOSIS — H61.22 IMPACTED CERUMEN OF LEFT EAR: ICD-10-CM

## 2018-02-06 DIAGNOSIS — G50.1 ATYPICAL FACIAL PAIN: ICD-10-CM

## 2018-02-06 PROCEDURE — 69210 REMOVE IMPACTED EAR WAX UNI: CPT | Performed by: OTOLARYNGOLOGY

## 2018-02-06 PROCEDURE — 99214 OFFICE O/P EST MOD 30 MIN: CPT | Mod: 25 | Performed by: OTOLARYNGOLOGY

## 2018-02-06 RX ORDER — PREDNISOLONE ACETATE 10 MG/ML
SUSPENSION/ DROPS OPHTHALMIC
Qty: 1 BOTTLE | Refills: 5 | Status: SHIPPED | OUTPATIENT
Start: 2018-02-06 | End: 2019-04-26

## 2018-02-06 NOTE — PATIENT INSTRUCTIONS
Scheduling Information  To schedule your CT/MRI scan, please contact [facility] at [phone] OR [facility] at [phone]    To schedule your Surgery, please contact our Specialty Schedulers at [phone]      ENT Clinic Locations Clinic Hours Telephone Number     [facility]  [address] [facility] [place] [address]   Monday:           1:00pm -- 5:00pm    Friday:              8:00am   12:00pm   To schedule/reschedule an appointment with   Dr. Pepper,   please contact our   Specialty Scheduling Department at:     [phone]       [facility]  [address] [place]  [place] [address] Tuesday:          8:00am -- 2:00pm         Urgent Care Locations Clinic Hours Telephone Numbers     [facility]  [address] [place] [address]     Monday-Friday:     11:00am   9:00pm    Saturday-Sunday:  9:00am   5:00pm   [phone]     [facility]  [address] [place] [address]     Monday-Friday:      5:00pm - 9:00pm     Saturday-Sunday:  9:00am   5:00pm   [phone]

## 2018-02-06 NOTE — MR AVS SNAPSHOT
After Visit Summary   2/6/2018    Jhoana Hernandez    MRN: [id number]           Patient Information     Date Of Birth          1955        Visit Information        Provider Department      2/6/2018 9:00 AM Hussein Pepper MD [facility]        Today's Diagnoses     Chronic eczematous otitis externa of both ears    -  1    Impacted cerumen of left ear        Atypical facial pain          Care Instructions    Scheduling Information  To schedule your CT/MRI scan, please contact [facility] at [phone] OR [facility] at [phone]    To schedule your Surgery, please contact our Specialty Schedulers at [phone]      ENT Clinic Locations Clinic Hours Telephone Number     [facility]  [address]  [place] [address]   Monday:           1:00pm -- 5:00pm    Friday:              8:00am - 12:00pm   To schedule/reschedule an appointment with   Dr. Pepper,   please contact our   Specialty Scheduling Department at:     [phone]       [facility]  [address] [place] [address] Tuesday:          8:00am -- 2:00pm         Urgent Care Locations Clinic Hours Telephone Numbers     [facility]  [address] [place] [address]     Monday-Friday:     11:00am - 9:00pm    Saturday-Sunday:  9:00am - 5:00pm   [phone]     [facility]  [address] [place] [address]     Monday-Friday:      5:00pm - 9:00pm     Saturday-Sunday:  9:00am - 5:00pm   [phone]                 Follow-ups after your visit        Who to contact     If you have questions or need follow up information about today's clinic visit or your schedule please contact [facility] directly at [phone].  Normal or non-critical lab and imaging results will be communicated to you by MyChart, letter or phone within 4 business days after the clinic has received the results. If you do not hear from us within 7  "days, please contact the clinic through [employer] or phone. If you have a critical or abnormal lab result, we will notify you by phone as soon as possible.  Submit refill requests through [employer] or call your pharmacy and they will forward the refill request to us. Please allow 3 business days for your refill to be completed.          Additional Information About Your Visit        [employer] Information     [employer] lets you send messages to your doctor, view your test results, renew your prescriptions, schedule appointments and more. To sign up, go to [email or web address] . Click on \"Log in\" on the left side of the screen, which will take you to the Welcome page. Then click on \"Sign up Now\" on the right side of the page.     You will be asked to enter the access code listed below, as well as some personal information. Please follow the directions to create your username and password.     Your access code is: [email or web address]  Expires: 2018  9:17 AM     Your access code will  in 90 days. If you need help or a new code, please call your [place] clinic or [phone].        Care EveryWhere ID     This is your Care EveryWhere ID. This could be used by other organizations to access your [facility] medical records  [id number]        Your Vitals Were     Temperature Height BMI (Body Mass Index)             98  F (36.7  C) (Tympanic) 1.626 m (5' 4\") 25.23 kg/m2          Blood Pressure from Last 3 Encounters:   17 130/80   17 126/77   16 133/79    Weight from Last 3 Encounters:   18 66.7 kg (147 lb)   17 58.5 kg (129 lb)   16 58.1 kg (128 lb)              We Performed the Following     Remove Cerumen          Where to get your medicines      These medications were sent to [id number] - [place], [place] - [address]  [address] [facility] [address]     Phone:  [phone]     prednisoLONE acetate 1 % ophthalmic susp          Primary Care Provider Office " Phone # Fax #    Nelson Camp [phone] [address]  [facility] [address]        Equal Access to Services     PRACHI BARRERA : Hadii nora ku sgo Sosorayaali, waaxda luqadaha, qaybta kaalmada adeegyada, fabian william laWaltkaterina ortiz. So [facility] [phone].    ATENCIÓN: Si habla español, tiene a cheatham disposición servicios gratuitos de asistencia lingüística. Llame al [phone].    We comply with applicable federal civil rights laws and [place] laws. We do not discriminate on the basis of race, color, national origin, age, disability, sex, sexual orientation, or gender identity.            Thank you!     Thank you for choosing [facility]  for your care. Our goal is always to provide you with excellent care. Hearing back from our patients is one way we can continue to improve our services. Please take a few minutes to complete the written survey that you may receive in the mail after your visit with us. Thank you!             Your Updated Medication List - Protect others around you: Learn how to safely use, store and throw away your medicines at www.disposemymeds.org.          This list is accurate as of 2/6/18  9:17 AM.  Always use your most recent med list.                   Brand Name Dispense Instructions for use Diagnosis    albuterol 108 (90 BASE) MCG/ACT Inhaler    PROAIR HFA/PROVENTIL HFA/VENTOLIN HFA    1 Inhaler    Inhale 1 puff into the lungs every 6 hours as needed for shortness of breath / dyspnea or wheezing    Upper respiratory tract infection, unspecified upper respiratory infection       BENADRYL ALLERGY PO      AS NEEDED FOR ALLERGIES        CLARITIN PO      AS NEEDED FOR ALLERGIES        conjugated estrogens cream    PREMARIN    30 g    Place 1 g vaginally twice a week    Postmenopausal       EPINEPHrine 0.3 MG/0.3ML injection 2-pack    EPIPEN/ADRENACLICK/or ANY BX GENERIC EQUIV    2 each    Inject 0.3 mLs (0.3 mg) into the muscle  once as needed    Allergy, food       mometasone 50 MCG/ACT spray    NASONEX    1 Box    Spray 2 sprays into both nostrils daily for allergy prevention.    Seasonal allergic rhinitis       nabumetone 500 MG tablet    RELAFEN    60 tablet    Take 2 tablets (1,000 mg) by mouth daily with food for hip pain.    Hip pain, left       prednisoLONE acetate 1 % ophthalmic susp    PRED FORTE    1 Bottle    4 drops into both ears twice weekly    Chronic eczematous otitis externa of both ears

## 2018-02-06 NOTE — LETTER
2/6/2018         RE: Jhoana Hernandez  [address]  [place] [facility] [address]        Dear Colleague,    Thank you for referring your patient, Jhoana Hernandez, to the [facility]. Please see a copy of my visit note below.    History of Present Illness - Jhoana Heranndez is a 61 year old female last seen on 4/12/16.     To review, for many years she has noted a sense of fullness and itching in the ears. She has tried various OTC drops before, but nothing seems to help.  She has never had ear surgery or chronic ear disease.  However, she notes that there seems to be a routine event every fall and spring where her ears seem more stuffy and full.  On exam, after debriding out the canals, her exam strongly suggested chronic eczematous otitis externa.  I would have liked to have used dermotic, but she has a severe anaphylactic nut allergy and therefore I had her use more home treatments like olive oil.  She tells me that it has seemed to help.  Her ears will get stuffy at times.    Also, she had complaints of some dull aching and fullness around the ears that would come and go.  She wears invisalign braces, and felt that this was temporomandibular disease.  I gave her a home therapy sheet.  And at the 9/30/13 visit, this problem had settled down just fine.  She had returned 4/12/16 due to a recurrence of ear pain.  This started about a month prior and she was diagnosed with otitis media and sinusitis.  Antibiotics were given, and things improved.    With regards to her temporomandibular disease, she does tell me that she notes that when she skips wearing her invisalign brace overnight the ear and facial aches do get better.    She has returned primarily to have her ears checked because of fullness since she ran out of prednisolone drops for the ears, more in the LEFT ear.    Past Medical History -   Patient Active Problem List   Diagnosis     CARDIOVASCULAR SCREENING; LDL GOAL  LESS THAN 160     Lactose intolerance     Seasonal allergic rhinitis     Allergy, food     Advanced directives, counseling/discussion     Subclinical hypothyroidism     Osteopenia     Hollenhorst plaque, left eye       Current Medications -   Current Outpatient Prescriptions:      nabumetone (RELAFEN) 500 MG tablet, Take 2 tablets (1,000 mg) by mouth daily with food for hip pain., Disp: 60 tablet, Rfl: 1     conjugated estrogens (PREMARIN) vaginal cream, Place 1 g vaginally twice a week, Disp: 30 g, Rfl: 3     EPINEPHrine (EPIPEN) 0.3 MG/0.3ML injection, Inject 0.3 mLs (0.3 mg) into the muscle once as needed, Disp: 2 each, Rfl: 3     mometasone (NASONEX) 50 MCG/ACT nasal spray, Spray 2 sprays into both nostrils daily for allergy prevention., Disp: 1 Box, Rfl: 3     albuterol (PROAIR HFA, PROVENTIL HFA, VENTOLIN HFA) 108 (90 BASE) MCG/ACT inhaler, Inhale 1 puff into the lungs every 6 hours as needed for shortness of breath / dyspnea or wheezing, Disp: 1 Inhaler, Rfl: 0     prednisoLONE acetate (PRED FORTE) 1 % ophthalmic suspension, 4 drops into both ears twice weekly, Disp: 1 Bottle, Rfl: 5     CLARITIN OR, AS NEEDED FOR ALLERGIES, Disp: , Rfl:      BENADRYL ALLERGY OR, AS NEEDED FOR ALLERGIES, Disp: , Rfl:     Allergies -   Allergies   Allergen Reactions     Aloe Vera      Fish      Fluticasone      Ibuprofen Sodium      Macrodantin [Nitrofuran Derivatives]      Mineral Oil      Nuts      Seafood      Shellfish Allergy      Sulfa Drugs      Yellow Dye        Social History -   History     Social History     Marital Status:      Spouse Name: Bruno     Number of Children: 2     Years of Education: N/A     Occupational History      shop       [employer]      Social History Main Topics     Smoking status: Never Smoker      Smokeless tobacco: Never Used     Alcohol Use: Yes      Comment: occassionally     Drug Use: No     Sexual Activity:     Partners: Male     Other Topics Concern      "Parent/Sibling W/ Cabg, Mi Or Angioplasty Before 65f 55m? No     Social History Narrative       Family History -   Family History   Problem Relation Age of Onset     Asthma Sister      DIABETES Sister      Type II (questionable)     Breast Cancer Sister 60     Lipids Mother      CEREBROVASCULAR DISEASE Father      Thyroid Disease Other      C.A.D. No family hx of      Hypertension No family hx of      Cancer - colorectal No family hx of      Prostate Cancer No family hx of        Review of Systems - As per HPI and PMHx, otherwise 7 system review of the head and neck negative.    Physical Exam  Temp 98  F (36.7  C) (Tympanic)  Ht 1.626 m (5' 4\")  Wt 66.7 kg (147 lb)  BMI 25.23 kg/m2    General - The patient is well nourished and well developed, and appears to have good nutritional status.  Alert and oriented to person and place, answers questions and cooperates with examination appropriately.   Head and Face - Normocephalic and atraumatic, with no gross asymmetry noted of the contour of the facial features.  The facial nerve is intact, with strong symmetric movements.  Voice and Breathing - The patient was breathing comfortably without the use of accessory muscles. There was no wheezing, stridor, or stertor.  The patients voice was clear and strong, and had appropriate pitch and quality.  Ears - the LEFT canal was densely obstructed with thick pasty yellow cerumen.  Using procedural otoscope, alligator, and suction, I debrided the LEFT canal.  The RIGHT canal was clear. The tympanic membranes are normal in appearance, bony landmarks are intact.  No retraction, perforation, or masses.  Normal mobility on valsalva maneuver, with no reports of dizziness on insuflation.  No fluid or purulence was seen in the external canal or the middle ear. No evidence of infection of the middle ear or external canal, cerumen was normal in appearance.  Eyes - Extraocular movements intact, and the pupils were reactive to light.  " Sclera were not icteric or injected, conjunctiva were pink and moist.  Mouth - Examination of the oral cavity showed pink, healthy oral mucosa. No lesions or ulcerations noted.  The tongue was mobile and midline, and the dentition were in good condition.    Throat - The walls of the oropharynx were smooth, pink, moist, symmetric, and had no lesions or ulcerations.  The tonsillar pillars and soft palate were symmetric.  The uvula was midline on elevation.    Neck - Normal midline excursion of the laryngotracheal complex during swallowing.  Full range of motion on passive movement.  Palpation of the occipital, submental, submandibular, internal jugular chain, and supraclavicular nodes did not demonstrate any abnormal lymph nodes or masses.  The carotid pulse was palpable bilaterally.  Palpation of the thyroid was soft and smooth, with no nodules or goiter appreciated.  The trachea was mobile and midline.  Nose - External contour is symmetric, no gross deflection or scars.  Nasal mucosa is pink and moist with no abnormal mucus.  The septum was midline and non-obstructive, turbinates of normal size and position.  No polyps, masses, or purulence noted on examination.    Tympanogram - Normal type A curves bilaterally, and normal pressure.    A/P - Jhoana Hernandez is a 61 year old female  (H92.09) Otalgia, unspecified ear  (primary encounter diagnosis)  (H60.8X3) Chronic eczematous otitis externa of both ears    Overall, Jhoana's health seems great.  Her ears are cleared now, and I have renewed the prednisolone drops to help suppress the chronic eczematous otitis externa that creates her fast build up in the canals.    Her allergic rhinitis is under good control, as is her temporomandibular disease>    Follow up in one year, sooner if there are any new issues.        Again, thank you for allowing me to participate in the care of your patient.        Sincerely,        Hussein Pepper MD

## 2018-02-06 NOTE — PROGRESS NOTES
History of Present Illness - Jhoana Hernandez is a 61 year old female last seen on 4/12/16.     To review, for many years she has noted a sense of fullness and itching in the ears. She has tried various OTC drops before, but nothing seems to help.  She has never had ear surgery or chronic ear disease.  However, she notes that there seems to be a routine event every fall and spring where her ears seem more stuffy and full.  On exam, after debriding out the canals, her exam strongly suggested chronic eczematous otitis externa.  I would have liked to have used dermotic, but she has a severe anaphylactic nut allergy and therefore I had her use more home treatments like olive oil.  She tells me that it has seemed to help.  Her ears will get stuffy at times.    Also, she had complaints of some dull aching and fullness around the ears that would come and go.  She wears invisalign braces, and felt that this was temporomandibular disease.  I gave her a home therapy sheet.  And at the 9/30/13 visit, this problem had settled down just fine.  She had returned 4/12/16 due to a recurrence of ear pain.  This started about a month prior and she was diagnosed with otitis media and sinusitis.  Antibiotics were given, and things improved.    With regards to her temporomandibular disease, she does tell me that she notes that when she skips wearing her invisalign brace overnight the ear and facial aches do get better.    She has returned primarily to have her ears checked because of fullness since she ran out of prednisolone drops for the ears, more in the LEFT ear.    Past Medical History -   Patient Active Problem List   Diagnosis     CARDIOVASCULAR SCREENING; LDL GOAL LESS THAN 160     Lactose intolerance     Seasonal allergic rhinitis     Allergy, food     Advanced directives, counseling/discussion     Subclinical hypothyroidism     Osteopenia     Hollenhorst plaque, left eye       Current Medications -   Current Outpatient  Prescriptions:      nabumetone (RELAFEN) 500 MG tablet, Take 2 tablets (1,000 mg) by mouth daily with food for hip pain., Disp: 60 tablet, Rfl: 1     conjugated estrogens (PREMARIN) vaginal cream, Place 1 g vaginally twice a week, Disp: 30 g, Rfl: 3     EPINEPHrine (EPIPEN) 0.3 MG/0.3ML injection, Inject 0.3 mLs (0.3 mg) into the muscle once as needed, Disp: 2 each, Rfl: 3     mometasone (NASONEX) 50 MCG/ACT nasal spray, Spray 2 sprays into both nostrils daily for allergy prevention., Disp: 1 Box, Rfl: 3     albuterol (PROAIR HFA, PROVENTIL HFA, VENTOLIN HFA) 108 (90 BASE) MCG/ACT inhaler, Inhale 1 puff into the lungs every 6 hours as needed for shortness of breath / dyspnea or wheezing, Disp: 1 Inhaler, Rfl: 0     prednisoLONE acetate (PRED FORTE) 1 % ophthalmic suspension, 4 drops into both ears twice weekly, Disp: 1 Bottle, Rfl: 5     CLARITIN OR, AS NEEDED FOR ALLERGIES, Disp: , Rfl:      BENADRYL ALLERGY OR, AS NEEDED FOR ALLERGIES, Disp: , Rfl:     Allergies -   Allergies   Allergen Reactions     Aloe Vera      Fish      Fluticasone      Ibuprofen Sodium      Macrodantin [Nitrofuran Derivatives]      Mineral Oil      Nuts      Seafood      Shellfish Allergy      Sulfa Drugs      Yellow Dye        Social History -   History     Social History     Marital Status:      Spouse Name: Bruno     Number of Children: 2     Years of Education: N/A     Occupational History      shop       [employer]      Social History Main Topics     Smoking status: Never Smoker      Smokeless tobacco: Never Used     Alcohol Use: Yes      Comment: occassionally     Drug Use: No     Sexual Activity:     Partners: Male     Other Topics Concern     Parent/Sibling W/ Cabg, Mi Or Angioplasty Before 65f 55m? No     Social History Narrative       Family History -   Family History   Problem Relation Age of Onset     Asthma Sister      DIABETES Sister      Type II (questionable)     Breast Cancer Sister 60     Lipids  "Mother      CEREBROVASCULAR DISEASE Father      Thyroid Disease Other      C.A.D. No family hx of      Hypertension No family hx of      Cancer - colorectal No family hx of      Prostate Cancer No family hx of        Review of Systems - As per HPI and PMHx, otherwise 7 system review of the head and neck negative.    Physical Exam  Temp 98  F (36.7  C) (Tympanic)  Ht 1.626 m (5' 4\")  Wt 66.7 kg (147 lb)  BMI 25.23 kg/m2    General - The patient is well nourished and well developed, and appears to have good nutritional status.  Alert and oriented to person and place, answers questions and cooperates with examination appropriately.   Head and Face - Normocephalic and atraumatic, with no gross asymmetry noted of the contour of the facial features.  The facial nerve is intact, with strong symmetric movements.  Voice and Breathing - The patient was breathing comfortably without the use of accessory muscles. There was no wheezing, stridor, or stertor.  The patients voice was clear and strong, and had appropriate pitch and quality.  Ears - the LEFT canal was densely obstructed with thick pasty yellow cerumen.  Using procedural otoscope, alligator, and suction, I debrided the LEFT canal.  The RIGHT canal was clear. The tympanic membranes are normal in appearance, bony landmarks are intact.  No retraction, perforation, or masses.  Normal mobility on valsalva maneuver, with no reports of dizziness on insuflation.  No fluid or purulence was seen in the external canal or the middle ear. No evidence of infection of the middle ear or external canal, cerumen was normal in appearance.  Eyes - Extraocular movements intact, and the pupils were reactive to light.  Sclera were not icteric or injected, conjunctiva were pink and moist.  Mouth - Examination of the oral cavity showed pink, healthy oral mucosa. No lesions or ulcerations noted.  The tongue was mobile and midline, and the dentition were in good condition.    Throat - The " walls of the oropharynx were smooth, pink, moist, symmetric, and had no lesions or ulcerations.  The tonsillar pillars and soft palate were symmetric.  The uvula was midline on elevation.    Neck - Normal midline excursion of the laryngotracheal complex during swallowing.  Full range of motion on passive movement.  Palpation of the occipital, submental, submandibular, internal jugular chain, and supraclavicular nodes did not demonstrate any abnormal lymph nodes or masses.  The carotid pulse was palpable bilaterally.  Palpation of the thyroid was soft and smooth, with no nodules or goiter appreciated.  The trachea was mobile and midline.  Nose - External contour is symmetric, no gross deflection or scars.  Nasal mucosa is pink and moist with no abnormal mucus.  The septum was midline and non-obstructive, turbinates of normal size and position.  No polyps, masses, or purulence noted on examination.    Tympanogram - Normal type A curves bilaterally, and normal pressure.    A/P - Jhoana Hernandez is a 61 year old female  (H92.09) Otalgia, unspecified ear  (primary encounter diagnosis)  (H60.8X3) Chronic eczematous otitis externa of both ears    Overall, Jhoana's health seems great.  Her ears are cleared now, and I have renewed the prednisolone drops to help suppress the chronic eczematous otitis externa that creates her fast build up in the canals.    Her allergic rhinitis is under good control, as is her temporomandibular disease>    Follow up in one year, sooner if there are any new issues.

## 2018-05-25 ENCOUNTER — OFFICE VISIT (OUTPATIENT)
Dept: FAMILY MEDICINE | Facility: CLINIC | Age: 63
End: 2018-05-25
Payer: COMMERCIAL

## 2018-05-25 VITALS
OXYGEN SATURATION: 100 % | TEMPERATURE: 97.8 F | DIASTOLIC BLOOD PRESSURE: 81 MMHG | HEIGHT: 64 IN | WEIGHT: 126 LBS | BODY MASS INDEX: 21.51 KG/M2 | HEART RATE: 72 BPM | RESPIRATION RATE: 20 BRPM | SYSTOLIC BLOOD PRESSURE: 127 MMHG

## 2018-05-25 DIAGNOSIS — Z13.1 SCREENING FOR DIABETES MELLITUS: ICD-10-CM

## 2018-05-25 DIAGNOSIS — Z13.6 CARDIOVASCULAR SCREENING; LDL GOAL LESS THAN 160: ICD-10-CM

## 2018-05-25 DIAGNOSIS — Z23 NEED FOR DIPHTHERIA-TETANUS-PERTUSSIS (TDAP) VACCINE: ICD-10-CM

## 2018-05-25 DIAGNOSIS — Z00.00 ENCOUNTER FOR ROUTINE ADULT HEALTH EXAMINATION WITHOUT ABNORMAL FINDINGS: Primary | ICD-10-CM

## 2018-05-25 DIAGNOSIS — Z11.4 SCREENING FOR HUMAN IMMUNODEFICIENCY VIRUS: ICD-10-CM

## 2018-05-25 DIAGNOSIS — L82.1 SEBORRHEIC KERATOSES: ICD-10-CM

## 2018-05-25 DIAGNOSIS — Z12.31 VISIT FOR SCREENING MAMMOGRAM: ICD-10-CM

## 2018-05-25 DIAGNOSIS — J30.1 CHRONIC SEASONAL ALLERGIC RHINITIS DUE TO POLLEN: ICD-10-CM

## 2018-05-25 DIAGNOSIS — Z80.3 FAMILY HISTORY OF BREAST CANCER: ICD-10-CM

## 2018-05-25 DIAGNOSIS — Z91.018 ALLERGY, FOOD: ICD-10-CM

## 2018-05-25 DIAGNOSIS — Z28.21 VACCINATION NOT CARRIED OUT BECAUSE OF PATIENT REFUSAL: ICD-10-CM

## 2018-05-25 DIAGNOSIS — E03.8 SUBCLINICAL HYPOTHYROIDISM: ICD-10-CM

## 2018-05-25 LAB
CHOLEST SERPL-MCNC: 207 MG/DL
GLUCOSE SERPL-MCNC: 96 MG/DL (ref 70–99)
HDLC SERPL-MCNC: 75 MG/DL
HIV 1+2 AB+HIV1 P24 AG SERPL QL IA: NONREACTIVE
LDLC SERPL CALC-MCNC: 113 MG/DL
NONHDLC SERPL-MCNC: 132 MG/DL
TRIGL SERPL-MCNC: 97 MG/DL
TSH SERPL DL<=0.005 MIU/L-ACNC: 3.58 MU/L (ref 0.4–4)

## 2018-05-25 PROCEDURE — 80061 LIPID PANEL: CPT | Performed by: FAMILY MEDICINE

## 2018-05-25 PROCEDURE — 84443 ASSAY THYROID STIM HORMONE: CPT | Performed by: FAMILY MEDICINE

## 2018-05-25 PROCEDURE — 82947 ASSAY GLUCOSE BLOOD QUANT: CPT | Performed by: FAMILY MEDICINE

## 2018-05-25 PROCEDURE — 99396 PREV VISIT EST AGE 40-64: CPT | Mod: 25 | Performed by: FAMILY MEDICINE

## 2018-05-25 PROCEDURE — 90471 IMMUNIZATION ADMIN: CPT | Performed by: FAMILY MEDICINE

## 2018-05-25 PROCEDURE — 90715 TDAP VACCINE 7 YRS/> IM: CPT | Performed by: FAMILY MEDICINE

## 2018-05-25 PROCEDURE — 87389 HIV-1 AG W/HIV-1&-2 AB AG IA: CPT | Performed by: FAMILY MEDICINE

## 2018-05-25 PROCEDURE — 36415 COLL VENOUS BLD VENIPUNCTURE: CPT | Performed by: FAMILY MEDICINE

## 2018-05-25 PROCEDURE — 99213 OFFICE O/P EST LOW 20 MIN: CPT | Mod: 25 | Performed by: FAMILY MEDICINE

## 2018-05-25 RX ORDER — EPINEPHRINE 0.3 MG/.3ML
0.3 INJECTION SUBCUTANEOUS
Qty: 0.6 ML | Refills: 1 | Status: SHIPPED | OUTPATIENT
Start: 2018-05-25 | End: 2018-12-03

## 2018-05-25 RX ORDER — MOMETASONE FUROATE MONOHYDRATE 50 UG/1
2 SPRAY, METERED NASAL DAILY
Qty: 1 BOX | Refills: 3 | Status: SHIPPED | OUTPATIENT
Start: 2018-05-25 | End: 2019-09-09

## 2018-05-25 RX ORDER — ALBUTEROL SULFATE 90 UG/1
1 AEROSOL, METERED RESPIRATORY (INHALATION) EVERY 6 HOURS PRN
Qty: 1 INHALER | Refills: 0 | Status: SHIPPED | OUTPATIENT
Start: 2018-05-25 | End: 2018-12-03

## 2018-05-25 ASSESSMENT — PAIN SCALES - GENERAL: PAINLEVEL: NO PAIN (0)

## 2018-05-25 NOTE — PATIENT INSTRUCTIONS
Preventive Health Recommendations  Female Ages 50 - 64    Yearly exam: See your health care provider every year in order to  o Review health changes.   o Discuss preventive care.    o Review your medicines if your doctor has prescribed any.      Get a Pap test every three years (unless you have an abnormal result and your provider advises testing more often).    If you get Pap tests with HPV test, you only need to test every 5 years, unless you have an abnormal result.     You do not need a Pap test if your uterus was removed (hysterectomy) and you have not had cancer.    You should be tested each year for STDs (sexually transmitted diseases) if you're at risk.     Have a mammogram every 1 to 2 years.    Have a colonoscopy at age 50, or have a yearly FIT test (stool test). These exams screen for colon cancer.      Have a cholesterol test every 5 years, or more often if advised.    Have a diabetes test (fasting glucose) every three years. If you are at risk for diabetes, you should have this test more often.     If you are at risk for osteoporosis (brittle bone disease), think about having a bone density scan (DEXA).    Shots: Get a flu shot each year. Get a tetanus shot every 10 years.    Nutrition:     Eat at least 5 servings of fruits and vegetables each day.    Eat whole-grain bread, whole-wheat pasta and brown rice instead of white grains and rice.    Talk to your provider about Calcium and Vitamin D.     Lifestyle    Exercise at least 150 minutes a week (30 minutes a day, 5 days a week). This will help you control your weight and prevent disease.    Limit alcohol to one drink per day.    No smoking.     Wear sunscreen to prevent skin cancer.     See your dentist every six months for an exam and cleaning.    See your eye doctor every 1 to 2 years.    [facility] to you, every time we see you.  If you receive a survey in the mail, please  send us back your thoughts. We really do value your feedback.    Based on your medical history, these are the current health maintenance/preventive care services that you are due for (some may have been done at this visit.)  Health Maintenance Due   Topic Date Due     HIV SCREEN (SYSTEM ASSIGNED)  1972     ADVANCE DIRECTIVE PLANNING Q5 YRS  2017       Suggested websites for health information:  Www.[employer]SongAfter : Up to date and easily searchable information on multiple topics.  Www.medlineplus.gov : medication info, interactive tutorials, watch real surgeries online  Www.familydoctor.org : good info from the Academy of Family Physicians  Www.cdc.gov : public health info, travel advisories, epidemics (H1N1)  Www.aap.org : children's health info, normal development, vaccinations  Www.health.[facility].[place].us : [place] dept of health, public health issues in [place], N1N1    Your care team:                            Family Medicine Internal Medicine   MD Eric Cheema MD Shantel Branch-Fleming, MD Katya Georgiev PA-C Megan Hill, APRN CNP    Marcus Stevenson MD Pediatrics   Stephen Escobar, HERMAN Sheffield, CNP MD Nafisa Wong APRN CNP   MD Lupis Maddox MD Deborah Mielke, MD Kim Thein, APRN [facility]      Clinic hours: Monday - Thursday 7 am-7 pm;  7 am-5 pm.   Urgent care: Monday - Friday 11 am-9 pm; Saturday and  9 am-5 pm.  Pharmacy : Monday -Thursday 8 am-8 pm; Friday 8 am-6 pm; Saturday and  9 am-5 pm.     Clinic: [phone]   Pharmacy: [phone]    Please call [facility] Radiology/[phone] to schedule your mammogram.

## 2018-05-25 NOTE — LETTER
May 29, 2018      Jhoana Hernandez  [address] [place] [address]              Dear Jhoana Hernandez      All of your labs were normal for you.     Enclosed is a copy of the results.  It was a pleasure to see you at your last appointment.    If you have any questions or concerns, please call myself or my nurse at [phone].      Sincerely,      Nelson Camp MD/NATASHA Roy MA  [employer]      Results for orders placed or performed in visit on 18   Lipid panel reflex to direct LDL Non-fasting   Result Value Ref Range    Cholesterol 207 (H) <200 mg/dL    Triglycerides 97 <150 mg/dL    HDL Cholesterol 75 >49 mg/dL    LDL Cholesterol Calculated 113 (H) <100 mg/dL    Non HDL Cholesterol 132 (H) <130 mg/dL   TSH with free T4 reflex   Result Value Ref Range    TSH 3.58 0.40 - 4.00 mU/L   Glucose   Result Value Ref Range    Glucose 96 70 - 99 mg/dL   HIV Antigen Antibody Combo   Result Value Ref Range    HIV Antigen Antibody Combo Nonreactive NR^Nonreactive                     RE: Jhoana Hernandez   MRN: [id number]  : 1955  ENC DATE: May 25, 2018

## 2018-05-25 NOTE — NURSING NOTE
Screening Questionnaire for Adult Immunization    Are you sick today?   No   Do you have allergies to medications, food, a vaccine component or latex?   Yes   Have you ever had a serious reaction after receiving a vaccination?   Yes   Do you have a long-term health problem with heart disease, lung disease, asthma, kidney disease, metabolic disease (e.g. diabetes), anemia, or other blood disorder?   No   Do you have cancer, leukemia, HIV/AIDS, or any other immune system problem?   No   In the past 3 months, have you taken medications that affect  your immune system, such as prednisone, other steroids, or anticancer drugs; drugs for the treatment of rheumatoid arthritis, Crohn s disease, or psoriasis; or have you had radiation treatments?   No   Have you had a seizure, or a brain or other nervous system problem?   No   During the past year, have you received a transfusion of blood or blood     products, or been given immune (gamma) globulin or antiviral drug?   No   For women: Are you pregnant or is there a chance you could become        pregnant during the next month?   No   Have you received any vaccinations in the past 4 weeks?   No     Immunization questionnaire was positive for at least one answer.  Notified Dr. Camp.        Per orders of Dr. Camp, injection of Adacel (tdap) given by Nadja Conklin. Patient instructed to remain in clinic for 15 minutes afterwards, and to report any adverse reaction to me immediately.       Screening performed by Nadja Conklin on 5/25/2018 at 9:35 AM.

## 2018-05-25 NOTE — MR AVS SNAPSHOT
After Visit Summary   5/25/2018    Jhoana Hernandez    MRN: [id number]           Patient Information     Date Of Birth          1955        Visit Information        Provider Department      5/25/2018 8:20 AM Nelson Camp MD [facility]        Today's Diagnoses     Encounter for routine adult health examination without abnormal findings    -  1    Chronic seasonal allergic rhinitis due to pollen        Allergy, food        Subclinical hypothyroidism        Family history of breast cancer        Visit for screening mammogram        CARDIOVASCULAR SCREENING; LDL GOAL LESS THAN 160        Screening for diabetes mellitus        Screening for human immunodeficiency virus        Need for diphtheria-tetanus-pertussis (Tdap) vaccine        Vaccination not carried out because of patient refusal          Care Instructions      Preventive Health Recommendations  Female Ages 50 - 64    Yearly exam: See your health care provider every year in order to  o Review health changes.   o Discuss preventive care.    o Review your medicines if your doctor has prescribed any.      Get a Pap test every three years (unless you have an abnormal result and your provider advises testing more often).    If you get Pap tests with HPV test, you only need to test every 5 years, unless you have an abnormal result.     You do not need a Pap test if your uterus was removed (hysterectomy) and you have not had cancer.    You should be tested each year for STDs (sexually transmitted diseases) if you're at risk.     Have a mammogram every 1 to 2 years.    Have a colonoscopy at age 50, or have a yearly FIT test (stool test). These exams screen for colon cancer.      Have a cholesterol test every 5 years, or more often if advised.    Have a diabetes test (fasting glucose) every three years. If you are at risk for diabetes, you should have this test more often.     If you are at risk for osteoporosis (brittle  bone disease), think about having a bone density scan (DEXA).    Shots: Get a flu shot each year. Get a tetanus shot every 10 years.    Nutrition:     Eat at least 5 servings of fruits and vegetables each day.    Eat whole-grain bread, whole-wheat pasta and brown rice instead of white grains and rice.    Talk to your provider about Calcium and Vitamin D.     Lifestyle    Exercise at least 150 minutes a week (30 minutes a day, 5 days a week). This will help you control your weight and prevent disease.    Limit alcohol to one drink per day.    No smoking.     Wear sunscreen to prevent skin cancer.     See your dentist every six months for an exam and cleaning.    See your eye doctor every 1 to 2 years.    At [facility], we strive to deliver an exceptional experience to you, every time we see you.  If you receive a survey in the mail, please send us back your thoughts. We really do value your feedback.    Based on your medical history, these are the current health maintenance/preventive care services that you are due for (some may have been done at this visit.)  Health Maintenance Due   Topic Date Due     HIV SCREEN (SYSTEM ASSIGNED)  02/01/1972     ADVANCE DIRECTIVE PLANNING Q5 YRS  04/03/2017       Suggested websites for health information:  Www.[employer].iC[employer]bi : Up to date and easily searchable information on multiple topics.  Www.medlineplus.gov : medication info, interactive tutorials, watch real surgeries online  Www.familydoctor.org : good info from the Academy of Family Physicians  Www.cdc.gov : public health info, travel advisories, epidemics (H1N1)  Www.aap.org : children's health info, normal development, vaccinations  Www.health.[facility].[place].us : [place] dept of health, public health issues in [place], N1N1    Your care team:                            Family Medicine Internal Medicine   MD Eric Cheema MD Shantel Branch-Fleming, MD Katya Georgiev PA-C Megan Hill, APRN CNP    [place]  "[facility]   HERMAN Draper CNP Paula Brito, MD Amelia Massimini APRN CNP   MD Lupis Maddox MD Deborah Mielke, MD Kim Thein, JOHNNIE [facility]      Clinic hours: Monday - Thursday 7 am-7 pm;  7 am-5 pm.   Urgent care: Monday - Friday 11 am-9 pm; Saturday and  9 am-5 pm.  Pharmacy : Monday -Thursday 8 am-8 pm; Friday 8 am-6 pm; Saturday and  9 am-5 pm.     Clinic: [phone]   Pharmacy: [phone]    Please call [facility] Radiology/[phone] to schedule your mammogram.           Follow-ups after your visit        Future tests that were ordered for you today     Open Future Orders        Priority Expected Expires Ordered    *MA Screening Digital Bilateral Routine  2019            Who to contact     If you have questions or need follow up information about today's clinic visit or your schedule please contact [facility] directly at [phone].  Normal or non-critical lab and imaging results will be communicated to you by MyChart, letter or phone within 4 business days after the clinic has received the results. If you do not hear from us within 7 days, please contact the clinic through MyChart or phone. If you have a critical or abnormal lab result, we will notify you by phone as soon as possible.  Submit refill requests through [employer] or call your pharmacy and they will forward the refill request to us. Please allow 3 business days for your refill to be completed.          Additional Information About Your Visit        Care EveryWhere ID     This is your Care EveryWhere ID. This could be used by other organizations to access your [place] medical records  [id number]        Your Vitals Were     Pulse Temperature Respirations Height Pulse Oximetry Breastfeeding?    72 97.8  F (36.6  C) (Oral) 20 5' 4\" (1.626 m) 100% No    BMI (Body Mass Index)                   21.63 kg/m2            Blood " Pressure from Last 3 Encounters:   05/25/18 127/81   03/09/17 130/80   02/28/17 126/77    Weight from Last 3 Encounters:   05/25/18 126 lb (57.2 kg)   02/06/18 147 lb (66.7 kg)   02/28/17 129 lb (58.5 kg)              We Performed the Following     Glucose     HIV Antigen Antibody Combo     Lipid panel reflex to direct LDL Non-fasting     TDAP VACCINE (ADACEL)     TSH with free T4 reflex          Where to get your medicines      These medications were sent to [id number] - [place], [place] - [address]  [address] [address]     Phone:  [phone]     albuterol 108 (90 Base) MCG/ACT Inhaler    EPINEPHrine 0.3 MG/0.3ML injection 2-pack    mometasone 50 MCG/ACT spray          Primary Care Provider Office Phone # Fax #    Nelson Camp [phone]       [address]  [facility] [address]        Equal Access to Services     [facility]: Hadii aad ku hadasho Soomaali, waaxda luqadaha, qaybta kaalmada adeegyada, waxay lizin haykaterina jewell . So [facility] [phone].    ATENCIÓN: Si habla español, tiene a cheatham disposición servicios gratuitos de asistencia lingüística. Llame al [phone].    We comply with applicable federal civil rights laws and [place] laws. We do not discriminate on the basis of race, color, national origin, age, disability, sex, sexual orientation, or gender identity.            Thank you!     Thank you for choosing [facility]  for your care. Our goal is always to provide you with excellent care. Hearing back from our patients is one way we can continue to improve our services. Please take a few minutes to complete the written survey that you may receive in the mail after your visit with us. Thank you!             Your Updated Medication List - Protect others around you: Learn how to safely use, store and throw away your medicines at www.disposemymeds.org.          This list is accurate as of 5/25/18   9:14 AM.  Always use your most recent med list.                   Brand Name Dispense Instructions for use Diagnosis    albuterol 108 (90 Base) MCG/ACT Inhaler    PROAIR HFA/PROVENTIL HFA/VENTOLIN HFA    1 Inhaler    Inhale 1 puff into the lungs every 6 hours as needed for shortness of breath / dyspnea or wheezing    Chronic seasonal allergic rhinitis due to pollen       BENADRYL ALLERGY PO      AS NEEDED FOR ALLERGIES        CLARITIN PO      AS NEEDED FOR ALLERGIES        conjugated estrogens cream    PREMARIN    30 g    Place 1 g vaginally twice a week    Postmenopausal       EPINEPHrine 0.3 MG/0.3ML injection 2-pack    EPIPEN/ADRENACLICK/or ANY BX GENERIC EQUIV    0.6 mL    Inject 0.3 mLs (0.3 mg) into the muscle once as needed    Allergy, food       mometasone 50 MCG/ACT spray    NASONEX    1 Box    Spray 2 sprays into both nostrils daily for allergy prevention.    Chronic seasonal allergic rhinitis due to pollen       nabumetone 500 MG tablet    RELAFEN    60 tablet    Take 2 tablets (1,000 mg) by mouth daily with food for hip pain.    Hip pain, left       prednisoLONE acetate 1 % ophthalmic susp    PRED FORTE    1 Bottle    4 drops into both ears twice weekly    Chronic eczematous otitis externa of both ears

## 2018-05-25 NOTE — PROGRESS NOTES
SUBJECTIVE:   CC: Jhoana Hernandez is an 63 year old woman who presents for preventive health visit.       Healthy Habits:    Do you get at least three servings of calcium containing foods daily (dairy, green leafy vegetables, etc.)? yes    Amount of exercise or daily activities, outside of work: 3-4 day(s) per week    Problems taking medications regularly No    Medication side effects: No    Have you had an eye exam in the past two years? yes    Do you see a dentist twice per year? yes    Do you have sleep apnea, excessive snoring or daytime drowsiness?no    Additional: RX refills on Epi pens, Nasonex, albuterol.    Allergies: She has had issues with allergies recently, especially if someone around her is wearing heavy perfume or cologne. She feels lightheaded and starts sneezing. The same reaction happens if she is around a lot of flowers in the spring. She has not tried using her inhaler when she has these symptoms. Ragweed worsens her allergies in the fall. Loratadine works well for her in general.    Today's PHQ-2 Score:   PHQ-2 ( 1999 Pfizer) 5/25/2018 8/30/2016   Q1: Little interest or pleasure in doing things 0 0   Q2: Feeling down, depressed or hopeless 0 0   PHQ-2 Score 0 0       Abuse: Current or Past(Physical, Sexual or Emotional)- No  Do you feel safe in your environment - Yes    Social History   Substance Use Topics     Smoking status: Never Smoker     Smokeless tobacco: Never Used     Alcohol use Yes      Comment: occassionally     If you drink alcohol do you typically have >3 drinks per day or >7 drinks per week? No                     Past medical, family, and social histories, medications, and allergies are reviewed and updated in Epic.     ROS:  CONSTITUTIONAL: NEGATIVE for fever, chills, change in weight  INTEGUMENTARY/SKIN: She notes some moles on her chest as well as skin tags.   EYES: NEGATIVE for vision changes or irritation  ENT: NEGATIVE for ear, mouth and throat problems  RESP:  "NEGATIVE for significant cough or SOB  BREAST: Hx of a benign lesion removed from her breast and her sister had breast cancer. NEGATIVE for masses, tenderness or discharge  CV: NEGATIVE for chest pain, palpitations or peripheral edema  GI: NEGATIVE for nausea, abdominal pain, heartburn, or change in bowel habits  : NEGATIVE for unusual urinary or vaginal symptoms. No vaginal bleeding.  MUSCULOSKELETAL: NEGATIVE for significant arthralgias or myalgia  NEURO: She had some sciatica symptoms in the past, associated with her left hip pain. She saw PT for this, but also notes that when she bends in certain ways, she has some numbness in her left foot, but this is intermittent and isn't too bothersome for her because she can adjust her movements to avoid the pain.  PSYCHIATRIC: NEGATIVE for changes in mood or affect     This document serves as a record of the services and decisions personally performed and made by Dr. Camp. It was created on his behalf by Elizabeth Vazquez, a trained medical scribe. The creation of this document is based the provider's statements to the medical scribe.  Elizabeth Vazquez May 25, 2018 8:56 AM   OBJECTIVE:   /81 (BP Location: Left arm, Patient Position: Chair, Cuff Size: Adult Regular)  Pulse 72  Temp 97.8  F (36.6  C) (Oral)  Resp 20  Ht 1.626 m (5' 4\")  Wt 57.2 kg (126 lb)  SpO2 100%  Breastfeeding? No  BMI 21.63 kg/m2     GENERAL: healthy, alert and no distress  EYES: Eyes grossly normal to inspection, PERRL and conjunctivae and sclerae normal  HENT: ear canals and TM's normal, nose and mouth without ulcers or lesions  NECK: no adenopathy, no asymmetry, masses, or scars and thyroid normal to palpation  RESP: lungs clear to auscultation - no rales, rhonchi or wheezes  BREAST: normal without masses, tenderness or nipple discharge and no palpable axillary masses or adenopathy  CV: regular rate and rhythm, normal S1 S2, no S3 or S4, no murmur, click or rub, no peripheral " edema and peripheral pulses strong  ABDOMEN: soft, nontender, no hepatosplenomegaly, no masses and bowel sounds normal  MS: no gross musculoskeletal defects noted, no edema  SKIN: 2 seborrheic keratoses at the left lateral rib margin, but no suspicious lesions or rashes  NEURO: Normal strength and tone, mentation intact and speech normal, cranial nerves 2-12 intact, DTRs symmetrical.   PSYCH: mentation appears normal, affect normal/bright     ASSESSMENT/PLAN:   (Z00.00) Encounter for routine adult health examination without abnormal findings  (primary encounter diagnosis)  Comment: Negative screening exam; up-to-date on preventive services.   Plan: TDAP VACCINE (ADACEL), Lipid panel reflex to         direct LDL Non-fasting, Glucose, HIV Antigen         Antibody Combo        Follow up in 1 year.    (J30.1) Chronic seasonal allergic rhinitis due to pollen  Comment: Refill request. Because she has a fragrance sensitivity component, I think albuterol would be beneficial.  Plan: mometasone (NASONEX) 50 MCG/ACT spray,         albuterol (PROAIR HFA/PROVENTIL HFA/VENTOLIN         HFA) 108 (90 Base) MCG/ACT Inhaler            (Z91.018) Allergy, food  Comment: Refill request.   Plan: EPINEPHrine (EPIPEN/ADRENACLICK/OR ANY BX         GENERIC EQUIV) 0.3 MG/0.3ML injection 2-pack            (E03.9) Subclinical hypothyroidism  Comment: Lab update. Last TSH = 3.96 in 2016.  Plan: TSH with free T4 reflex        Monitor periodically.     (L82.1) Seborrheic keratoses  Comment: Benign nature discussed.  Plan: She can return for a shave excision as desired.    (Z80.3) Family history of breast cancer  (Z12.31) Visit for screening mammogram  Comment: Sister. Yearly screening recommended.  Plan: *MA Screening Digital Bilateral            (Z13.6) CARDIOVASCULAR SCREENING; LDL GOAL LESS THAN 160  Comment: historically at goal.  Plan: Lipid panel reflex to direct LDL Non-fasting        Monitor periodically.     (Z13.1) Screening for diabetes  "mellitus  Comment:   Plan: Glucose        Monitor periodically.     (Z11.4) Screening for human immunodeficiency virus  Comment: indications for screening discussed with the patient   Plan: HIV Antigen Antibody Combo            (Z23) Need for diphtheria-tetanus-pertussis (Tdap) vaccine  Comment:   Plan: TDAP VACCINE (ADACEL)            (Z28.21) Vaccination not carried out because of patient refusal  Comment: Shingrix vaccine offered but declined by the patient because she reports an adverse reaction to Zostavax in the past.  Plan: VIS provided. Readdress at future visits.      COUNSELING:   Reviewed preventive health counseling, as reflected in patient instructions  Special attention given to:        HIV screeninx in teen years, 1x in adult years, and at intervals if high risk    BP Screening:   Last 3 BP Readings:    BP Readings from Last 3 Encounters:   18 127/81   17 130/80   17 126/77       The following was recommended to the patient:  Re-screen BP within a year and recommended lifestyle modifications     reports that she has never smoked. She has never used smokeless tobacco.    Estimated body mass index is 21.63 kg/(m^2) as calculated from the following:    Height as of this encounter: 1.626 m (5' 4\").    Weight as of this encounter: 57.2 kg (126 lb).       Counseling Resources:  ATP IV Guidelines  Pooled Cohorts Equation Calculator  Breast Cancer Risk Calculator  FRAX Risk Assessment  ICSI Preventive Guidelines  Dietary Guidelines for Americans,   USDA's MyPlate  ASA Prophylaxis  Lung CA Screening    The information in this document, created by the medical scribe for me, accurately reflects the services I personally performed and the decisions made by me. I have reviewed and approved this document for accuracy prior to leaving the patient care area. May 25, 2018 8:56 AM      Nelson Camp MD  [facility]  "

## 2018-05-27 ENCOUNTER — HEALTH MAINTENANCE LETTER (OUTPATIENT)
Age: 63
End: 2018-05-27

## 2018-05-31 ENCOUNTER — RADIANT APPOINTMENT (OUTPATIENT)
Dept: MAMMOGRAPHY | Facility: CLINIC | Age: 63
End: 2018-05-31
Attending: FAMILY MEDICINE
Payer: COMMERCIAL

## 2018-05-31 DIAGNOSIS — Z80.3 FAMILY HISTORY OF BREAST CANCER: ICD-10-CM

## 2018-05-31 DIAGNOSIS — Z12.31 VISIT FOR SCREENING MAMMOGRAM: ICD-10-CM

## 2018-05-31 PROCEDURE — 77067 SCR MAMMO BI INCL CAD: CPT | Performed by: RADIOLOGY

## 2018-05-31 PROCEDURE — 77063 BREAST TOMOSYNTHESIS BI: CPT | Performed by: RADIOLOGY

## 2018-08-04 ENCOUNTER — OFFICE VISIT (OUTPATIENT)
Dept: URGENT CARE | Facility: URGENT CARE | Age: 63
End: 2018-08-04
Payer: COMMERCIAL

## 2018-08-04 VITALS
RESPIRATION RATE: 16 BRPM | BODY MASS INDEX: 21.46 KG/M2 | HEART RATE: 69 BPM | TEMPERATURE: 98 F | DIASTOLIC BLOOD PRESSURE: 70 MMHG | SYSTOLIC BLOOD PRESSURE: 123 MMHG | WEIGHT: 125 LBS | OXYGEN SATURATION: 100 %

## 2018-08-04 DIAGNOSIS — H61.22 IMPACTED CERUMEN OF LEFT EAR: Primary | ICD-10-CM

## 2018-08-04 PROCEDURE — 99212 OFFICE O/P EST SF 10 MIN: CPT | Mod: 25 | Performed by: NURSE PRACTITIONER

## 2018-08-04 PROCEDURE — 69209 REMOVE IMPACTED EAR WAX UNI: CPT | Mod: LT | Performed by: NURSE PRACTITIONER

## 2018-08-04 ASSESSMENT — ENCOUNTER SYMPTOMS
CARDIOVASCULAR NEGATIVE: 1
EYES NEGATIVE: 1
CONSTITUTIONAL NEGATIVE: 1

## 2018-08-04 NOTE — PROGRESS NOTES
SUBJECTIVE:   Jhoana Hernandez is a 63 year old female presenting with a chief complaint of   Chief Complaint   Patient presents with     Plugged Ears     Both ears plugged, left ear is more severe        She is an established patient of [facility].    URI Adult    Onset of symptoms was 2 weeks of URI symptoms with worsening ear fullness and decreased hearing in left ear for 2 days.  Course of illness is worsening.  Current and Associated symptoms: ear fullness left  Treatment measures tried include ENT prescribed pred forte ear drops with minimal relief.  Predisposing factors include HX of recurrent Otitis externa eczematous   Denies any fever, chills or known ill contacts      Review of Systems   Constitutional: Negative.    HENT: Positive for ear pain.    Eyes: Negative.    Cardiovascular: Negative.    Allergic/Immunologic: Positive for environmental allergies.   All other systems reviewed and are negative.      Past Medical History:   Diagnosis Date     Gastritis      IBS (irritable bowel syndrome)      Infertility, female 1989    had IVF     Lactose intolerance      Osteopenia 11/4/2011     Ovarian cyst 2011    bilateral serous cystadenomas     Recurrent UTI      Seasonal allergic rhinitis      Family History   Problem Relation Age of Onset     Asthma Sister      Diabetes Sister      Type II (questionable)     Breast Cancer Sister 60     Lipids Mother      Cerebrovascular Disease Father      Thyroid Disease Other      C.A.D. No family hx of      Hypertension No family hx of      Cancer - colorectal No family hx of      Prostate Cancer No family hx of      Current Outpatient Prescriptions   Medication Sig Dispense Refill     albuterol (PROAIR HFA/PROVENTIL HFA/VENTOLIN HFA) 108 (90 Base) MCG/ACT Inhaler Inhale 1 puff into the lungs every 6 hours as needed for shortness of breath / dyspnea or wheezing 1 Inhaler 0     BENADRYL ALLERGY OR AS NEEDED FOR ALLERGIES       CLARITIN OR AS NEEDED FOR ALLERGIES        conjugated estrogens (PREMARIN) vaginal cream Place 1 g vaginally twice a week 30 g 3     EPINEPHrine (EPIPEN/ADRENACLICK/OR ANY BX GENERIC EQUIV) 0.3 MG/0.3ML injection 2-pack Inject 0.3 mLs (0.3 mg) into the muscle once as needed 0.6 mL 1     mometasone (NASONEX) 50 MCG/ACT spray Spray 2 sprays into both nostrils daily for allergy prevention. 1 Box 3     nabumetone (RELAFEN) 500 MG tablet Take 2 tablets (1,000 mg) by mouth daily with food for hip pain. 60 tablet 1     prednisoLONE acetate (PRED FORTE) 1 % ophthalmic susp 4 drops into both ears twice weekly 1 Bottle 5     Social History   Substance Use Topics     Smoking status: Never Smoker     Smokeless tobacco: Never Used     Alcohol use Yes      Comment: occassionally       OBJECTIVE  /70 (BP Location: Left arm, Patient Position: Chair, Cuff Size: Adult Regular)  Pulse 69  Temp 98  F (36.7  C) (Oral)  Resp 16  Wt 125 lb (56.7 kg)  SpO2 100%  BMI 21.46 kg/m2    Physical Exam   Constitutional: She is oriented to person, place, and time. No distress.   HENT:   LEFT ear canal with yellow cerumen impaction. RIGHT ear canal with mild cerumen noted, TM visualized with no erythema.    Cardiovascular: Normal rate, regular rhythm, normal heart sounds and intact distal pulses.    No murmur heard.  Pulmonary/Chest: Effort normal and breath sounds normal. No respiratory distress.   Neurological: She is alert and oriented to person, place, and time.   Skin: Skin is warm and dry.   Psychiatric: She has a normal mood and affect.   Procedure: Bilateral ear irrigation performed with improved hearing and fullness sensation    Labs:  No results found for this or any previous visit (from the past 24 hour(s)).      ASSESSMENT:    ICD-10-CM    1. Impacted cerumen of left ear H61.22         Medical Decision Making:    Differential Diagnosis:  URI Adult/Peds:  Acute right otitis media and Otitis externa    Serious Comorbid Conditions:  Adult:  None    PLAN: Cerumen  impaction cleared with irrigation, ear care reviewed, education provided. Patient agreed to the plan of care with no further questions or concerns.     Followup: If not improving or if condition worsens, follow up with your Primary Care Provider as needed.       JOHNNIE Martinez, CNP      Patient Instructions     Earwax Removal    The ear canal makes earwax from the canal s lining. The ears make wax to lubricate and protect the ear canal. The ear canal is the tube that connects the middle ear to the outside of the ear. The wax protects the ear from bacteria, infection, and damage from water or trauma.  The wax that forms in the canal naturally moves toward the outside of the ear and falls out. In some cases, the ear may make too much wax. If the wax causes problems or keeps the healthcare provider from seeing into the ear, the extra wax may be removed.  Too much wax can affect your hearing. It can cause itching. In rare cases, it can be painful. Earwax should not be removed unless it is causing a problem. You should not stick objects into your ear to remove wax unless told to do so by your healthcare provider.  Healthcare providers can remove earwax safely. It is important to stay still during the procedure to avoid damage to the ear canal. But removing earwax generally doesn t hurt. You will not usually need anesthesia or pain medicine when the provider removes the earwax.  A number of conditions lead to earwax buildup. These include some skin problems, a narrow ear canal, or ears that make too much earwax. Using cotton swabs in the canal pushes earwax deeper into the ear and contributes to the buildup of earwax.  Home care    The healthcare provider may recommend mineral oil or an over-the-counter eardrop to use at home to soften the earwax. Use these products only if the provider recommends them. Carefully follow the instructions given.    Don t use mineral oil or OTC eardrops if you might have an ear  infection or a ruptured eardrum. Tell your healthcare provider right away if you have diabetes or an immune disorder.    Don t use cotton swabs in your ears. Cotton swabs may push wax deeper into the ear canal or damage the eardrum. Use cotton gauze or a wet washcloth  to gently remove wax on the outside of the ear and around the opening to the ear canal.    Don't use any probing device or object such as cotton-tipped swabs or sydni pins to clean the inside of your ears.    Don t use ear candles to clean your ears. Candling can be dangerous. It can burn the ear canal. It can also make the condition worse instead of better.    Don t use cold water to rinse the ear. This will make you dizzy. If your provider tells you to rinse your ear, use only warm water or follow his or her instructions.    Check the ear for signs of infection or irritation listed below under When to seek medical advice.  Steps for using eardrops  1. Warm the medicine bottle by rubbing it between your hands for a few minutes.  2. Lie down on your side, with the affected ear up.  3. Place the recommended number of drops in the ear. Wet a cotton ball with the medicine. Gently put the cotton ball into the ear opening.  Follow-up care  Follow up with your healthcare provider, or as directed.  When to seek medical advice  Call the provider right away if you have:    Ear pain that gets worse    Fever of 100.4F F (38 C) or higher, or as directed by your healthcare provider    Worsening wax buildup    Severe pain, dizziness, or nausea    Bleeding from the ear    Hearing problems    Signs of irritation from the eardrops, such as burning, stinging, or swelling and tenderness    Foul-smelling fluid draining from the ear    Swelling, redness, or tenderness of the outer ear    Headache, neck pain, or stiff neck  Date Last Reviewed: 6/1/2017 2000-2017 [employer]. [address] [place] [address], [place] [address]. All rights reserved. This information  is not intended as a substitute for professional medical care. Always follow your healthcare professional's instructions.

## 2018-08-04 NOTE — PATIENT INSTRUCTIONS
Earwax Removal    The ear canal makes earwax from the canal s lining. The ears make wax to lubricate and protect the ear canal. The ear canal is the tube that connects the middle ear to the outside of the ear. The wax protects the ear from bacteria, infection, and damage from water or trauma.  The wax that forms in the canal naturally moves toward the outside of the ear and falls out. In some cases, the ear may make too much wax. If the wax causes problems or keeps the healthcare provider from seeing into the ear, the extra wax may be removed.  Too much wax can affect your hearing. It can cause itching. In rare cases, it can be painful. Earwax should not be removed unless it is causing a problem. You should not stick objects into your ear to remove wax unless told to do so by your healthcare provider.  Healthcare providers can remove earwax safely. It is important to stay still during the procedure to avoid damage to the ear canal. But removing earwax generally doesn t hurt. You will not usually need anesthesia or pain medicine when the provider removes the earwax.  A number of conditions lead to earwax buildup. These include some skin problems, a narrow ear canal, or ears that make too much earwax. Using cotton swabs in the canal pushes earwax deeper into the ear and contributes to the buildup of earwax.  Home care    The healthcare provider may recommend mineral oil or an over-the-counter eardrop to use at home to soften the earwax. Use these products only if the provider recommends them. Carefully follow the instructions given.    Don t use mineral oil or OTC eardrops if you might have an ear infection or a ruptured eardrum. Tell your healthcare provider right away if you have diabetes or an immune disorder.    Don t use cotton swabs in your ears. Cotton swabs may push wax deeper into the ear canal or damage the eardrum. Use cotton gauze or a wet washcloth  to gently remove wax on the outside of the ear and  around the opening to the ear canal.    Don't use any probing device or object such as cotton-tipped swabs or sydni pins to clean the inside of your ears.    Don t use ear candles to clean your ears. Candling can be dangerous. It can burn the ear canal. It can also make the condition worse instead of better.    Don t use cold water to rinse the ear. This will make you dizzy. If your provider tells you to rinse your ear, use only warm water or follow his or her instructions.    Check the ear for signs of infection or irritation listed below under When to seek medical advice.  Steps for using eardrops  1. Warm the medicine bottle by rubbing it between your hands for a few minutes.  2. Lie down on your side, with the affected ear up.  3. Place the recommended number of drops in the ear. Wet a cotton ball with the medicine. Gently put the cotton ball into the ear opening.  Follow-up care  Follow up with your healthcare provider, or as directed.  When to seek medical advice  Call the provider right away if you have:    Ear pain that gets worse    Fever of 100.4F F (38 C) or higher, or as directed by your healthcare provider    Worsening wax buildup    Severe pain, dizziness, or nausea    Bleeding from the ear    Hearing problems    Signs of irritation from the eardrops, such as burning, stinging, or swelling and tenderness    Foul-smelling fluid draining from the ear    Swelling, redness, or tenderness of the outer ear    Headache, neck pain, or stiff neck  Date Last Reviewed: 6/1/2017 2000-2017 [employer]. [address] [place] [address], [place] [address]. All rights reserved. This information is not intended as a substitute for professional medical care. Always follow your healthcare professional's instructions.

## 2018-08-04 NOTE — MR AVS SNAPSHOT
After Visit Summary   8/4/2018    Jhoana Hernandez    MRN: [id number]           Patient Information     Date Of Birth          1955        Visit Information        Provider Department      8/4/2018 2:10 PM Rodrigue Owens APRN [facility]        Today's Diagnoses     Impacted cerumen of left ear    -  1      Care Instructions      Earwax Removal    The ear canal makes earwax from the canal s lining. The ears make wax to lubricate and protect the ear canal. The ear canal is the tube that connects the middle ear to the outside of the ear. The wax protects the ear from bacteria, infection, and damage from water or trauma.  The wax that forms in the canal naturally moves toward the outside of the ear and falls out. In some cases, the ear may make too much wax. If the wax causes problems or keeps the healthcare provider from seeing into the ear, the extra wax may be removed.  Too much wax can affect your hearing. It can cause itching. In rare cases, it can be painful. Earwax should not be removed unless it is causing a problem. You should not stick objects into your ear to remove wax unless told to do so by your healthcare provider.  Healthcare providers can remove earwax safely. It is important to stay still during the procedure to avoid damage to the ear canal. But removing earwax generally doesn t hurt. You will not usually need anesthesia or pain medicine when the provider removes the earwax.  A number of conditions lead to earwax buildup. These include some skin problems, a narrow ear canal, or ears that make too much earwax. Using cotton swabs in the canal pushes earwax deeper into the ear and contributes to the buildup of earwax.  Home care    The healthcare provider may recommend mineral oil or an over-the-counter eardrop to use at home to soften the earwax. Use these products only if the provider recommends them. Carefully follow the instructions  given.    Don t use mineral oil or OTC eardrops if you might have an ear infection or a ruptured eardrum. Tell your healthcare provider right away if you have diabetes or an immune disorder.    Don t use cotton swabs in your ears. Cotton swabs may push wax deeper into the ear canal or damage the eardrum. Use cotton gauze or a wet washcloth  to gently remove wax on the outside of the ear and around the opening to the ear canal.    Don't use any probing device or object such as cotton-tipped swabs or sydni pins to clean the inside of your ears.    Don t use ear candles to clean your ears. Candling can be dangerous. It can burn the ear canal. It can also make the condition worse instead of better.    Don t use cold water to rinse the ear. This will make you dizzy. If your provider tells you to rinse your ear, use only warm water or follow his or her instructions.    Check the ear for signs of infection or irritation listed below under When to seek medical advice.  Steps for using eardrops  1. Warm the medicine bottle by rubbing it between your hands for a few minutes.  2. Lie down on your side, with the affected ear up.  3. Place the recommended number of drops in the ear. Wet a cotton ball with the medicine. Gently put the cotton ball into the ear opening.  Follow-up care  Follow up with your healthcare provider, or as directed.  When to seek medical advice  Call the provider right away if you have:    Ear pain that gets worse    Fever of 100.4F F (38 C) or higher, or as directed by your healthcare provider    Worsening wax buildup    Severe pain, dizziness, or nausea    Bleeding from the ear    Hearing problems    Signs of irritation from the eardrops, such as burning, stinging, or swelling and tenderness    Foul-smelling fluid draining from the ear    Swelling, redness, or tenderness of the outer ear    Headache, neck pain, or stiff neck  Date Last Reviewed: 6/1/2017 2000-2017 [employer] [address]  [place] [address]. All rights reserved. This information is not intended as a substitute for professional medical care. Always follow your healthcare professional's instructions.                Follow-ups after your visit        Who to contact     If you have questions or need follow up information about today's clinic visit or your schedule please contact [facility] directly at [phone].  Normal or non-critical lab and imaging results will be communicated to you by MyChart, letter or phone within 4 business days after the clinic has received the results. If you do not hear from us within 7 days, please contact the clinic through MyChart or phone. If you have a critical or abnormal lab result, we will notify you by phone as soon as possible.  Submit refill requests through [employer] or call your pharmacy and they will forward the refill request to us. Please allow 3 business days for your refill to be completed.          Additional Information About Your Visit        Care EveryWhere ID     This is your Care EveryWhere ID. This could be used by other organizations to access your [facility] medical records  [id number]        Your Vitals Were     Pulse Temperature Respirations Pulse Oximetry BMI (Body Mass Index)       69 98  F (36.7  C) (Oral) 16 100% 21.46 kg/m2        Blood Pressure from Last 3 Encounters:   08/04/18 123/70   05/25/18 127/81   03/09/17 130/80    Weight from Last 3 Encounters:   08/04/18 125 lb (56.7 kg)   05/25/18 126 lb (57.2 kg)   02/06/18 147 lb (66.7 kg)              Today, you had the following     No orders found for display       Primary Care Provider Office Phone # Fax #    Nelson Camp [phone]       [address]  [facility] [address]        Equal Access to Services     PRACHI BARRERA : Rossi Ramon, waaxda luqadaha, qaybta kaalmada buddy, fabian ortiz. So [facility]  [phone].    ATENCIÓN: Si christian crawley, tiene a cheatham disposición servicios gratuitos de asistencia lingüística. Dm gamboa [phone].    We comply with applicable federal civil rights laws and [place] laws. We do not discriminate on the basis of race, color, national origin, age, disability, sex, sexual orientation, or gender identity.            Thank you!     Thank you for choosing [facility]  for your care. Our goal is always to provide you with excellent care. Hearing back from our patients is one way we can continue to improve our services. Please take a few minutes to complete the written survey that you may receive in the mail after your visit with us. Thank you!             Your Updated Medication List - Protect others around you: Learn how to safely use, store and throw away your medicines at www.disposemymeds.org.          This list is accurate as of 8/4/18  3:14 PM.  Always use your most recent med list.                   Brand Name Dispense Instructions for use Diagnosis    albuterol 108 (90 Base) MCG/ACT Inhaler    PROAIR HFA/PROVENTIL HFA/VENTOLIN HFA    1 Inhaler    Inhale 1 puff into the lungs every 6 hours as needed for shortness of breath / dyspnea or wheezing    Chronic seasonal allergic rhinitis due to pollen       BENADRYL ALLERGY PO      AS NEEDED FOR ALLERGIES        CLARITIN PO      AS NEEDED FOR ALLERGIES        conjugated estrogens cream    PREMARIN    30 g    Place 1 g vaginally twice a week    Postmenopausal       EPINEPHrine 0.3 MG/0.3ML injection 2-pack    EPIPEN/ADRENACLICK/or ANY BX GENERIC EQUIV    0.6 mL    Inject 0.3 mLs (0.3 mg) into the muscle once as needed    Allergy, food       mometasone 50 MCG/ACT spray    NASONEX    1 Box    Spray 2 sprays into both nostrils daily for allergy prevention.    Chronic seasonal allergic rhinitis due to pollen       nabumetone 500 MG tablet    RELAFEN    60 tablet    Take 2 tablets (1,000 mg) by mouth daily with food  for hip pain.    Hip pain, left       prednisoLONE acetate 1 % ophthalmic susp    PRED FORTE    1 Bottle    4 drops into both ears twice weekly    Chronic eczematous otitis externa of both ears

## 2018-12-03 ENCOUNTER — OFFICE VISIT (OUTPATIENT)
Dept: FAMILY MEDICINE | Facility: CLINIC | Age: 63
End: 2018-12-03
Payer: COMMERCIAL

## 2018-12-03 VITALS
BODY MASS INDEX: 21.68 KG/M2 | DIASTOLIC BLOOD PRESSURE: 79 MMHG | HEART RATE: 76 BPM | SYSTOLIC BLOOD PRESSURE: 134 MMHG | OXYGEN SATURATION: 100 % | TEMPERATURE: 98.3 F | RESPIRATION RATE: 18 BRPM | HEIGHT: 64 IN | WEIGHT: 127 LBS

## 2018-12-03 DIAGNOSIS — S80.11XA CONTUSION OF RIGHT LOWER LEG, INITIAL ENCOUNTER: ICD-10-CM

## 2018-12-03 DIAGNOSIS — Z23 NEED FOR SHINGLES VACCINE: ICD-10-CM

## 2018-12-03 DIAGNOSIS — Z28.21 VACCINATION NOT CARRIED OUT BECAUSE OF PATIENT REFUSAL: ICD-10-CM

## 2018-12-03 DIAGNOSIS — Z91.018 ALLERGY, FOOD: ICD-10-CM

## 2018-12-03 DIAGNOSIS — H61.23 BILATERAL IMPACTED CERUMEN: Primary | ICD-10-CM

## 2018-12-03 PROCEDURE — 69209 REMOVE IMPACTED EAR WAX UNI: CPT | Mod: LT | Performed by: FAMILY MEDICINE

## 2018-12-03 PROCEDURE — 99214 OFFICE O/P EST MOD 30 MIN: CPT | Mod: 25 | Performed by: FAMILY MEDICINE

## 2018-12-03 PROCEDURE — 90750 HZV VACC RECOMBINANT IM: CPT | Performed by: FAMILY MEDICINE

## 2018-12-03 PROCEDURE — 90471 IMMUNIZATION ADMIN: CPT | Performed by: FAMILY MEDICINE

## 2018-12-03 RX ORDER — ALBUTEROL SULFATE 90 UG/1
AEROSOL, METERED RESPIRATORY (INHALATION)
Refills: 0 | COMMUNITY
Start: 2018-05-25 | End: 2021-05-11

## 2018-12-03 RX ORDER — EPINEPHRINE 0.3 MG/.3ML
0.3 INJECTION SUBCUTANEOUS PRN
Qty: 0.6 ML | Refills: 1 | Status: SHIPPED | OUTPATIENT
Start: 2018-12-03 | End: 2019-09-09

## 2018-12-03 ASSESSMENT — PAIN SCALES - GENERAL: PAINLEVEL: NO PAIN (0)

## 2018-12-03 NOTE — NURSING NOTE
1.  Has the patient received the information for the Zostavax vaccine? YES    2.  Does the patient have any of the following contraindications?     Allergy to Neomycin or Gelatin? No       Current moderate or severe illness? No  Temp = 98.3  If temp > 99.0 degrees orally or patient has above allergies, vaccine is deferred    3.  The vaccine has been administered in the usual fashion and the patient was instructed to wait 15 minutes before leaving the building in the event of an allergic reaction: YES    Vaccination given by NATASHA Roy MA    Recorded by Aster Roy

## 2018-12-03 NOTE — MR AVS SNAPSHOT
After Visit Summary   12/3/2018    Jhoana Hernandez    MRN: [id number]           Patient Information     Date Of Birth          1955        Visit Information        Provider Department      12/3/2018 8:20 AM Nelson Camp MD [facility]        Today's Diagnoses     Bilateral impacted cerumen    -  1    Contusion of right lower leg, initial encounter        Allergy, food        Need for shingles vaccine        Vaccination not carried out because of patient refusal          Care Instructions    At [facility], we strive to deliver an exceptional experience to you, every time we see you.  If you receive a survey in the mail, please send us back your thoughts. We really do value your feedback.    Your care team:                            Family Medicine Internal Medicine   MD Eric Cheema MD Shantel Branch-Fleming, MD Katya Georgiev PA-C Megan Hill, APRN [facility]    Marcus Stevenson MD Pediatrics   Stephen Escobar, PA-C  Eileen Sheffield, CNP MD Nafisa Wong APRN CNP   MD Lupis Maddox MD Deborah Mielke, MD Kim Thein, APRN CNP      Clinic hours: Monday - Thursday 7 am-7 pm; Fridays 7 am-5 pm.   Urgent care: Monday - Friday 11 am-9 pm; Saturday and Sunday 9 am-5 pm.  Pharmacy : Monday -Thursday 8 am-8 pm; Friday 8 am-6 pm; Saturday and Sunday 9 am-5 pm.     Clinic: [phone]   Pharmacy: [phone]      Receive the final Shingles vaccine between 2/3/19 and 6/3/19. You received the first of 2 injections today.          Follow-ups after your visit        Follow-up notes from your care team     Return in about 2 months (around 2/3/2019) for second shingles vaccination.      Who to contact     If you have questions or need follow up information about today's clinic visit or your schedule please contact [facility] directly at [phone].  Normal or non-critical lab and imaging results  "will be communicated to you by MyChart, letter or phone within 4 business days after the clinic has received the results. If you do not hear from us within 7 days, please contact the clinic through [employer] or phone. If you have a critical or abnormal lab result, we will notify you by phone as soon as possible.  Submit refill requests through [employer] or call your pharmacy and they will forward the refill request to us. Please allow 3 business days for your refill to be completed.          Additional Information About Your Visit        [employer] Information     [employer] lets you send messages to your doctor, view your test results, renew your prescriptions, schedule appointments and more. To sign up, go to [email or web address] . Click on \"Log in\" on the left side of the screen, which will take you to the Welcome page. Then click on \"Sign up Now\" on the right side of the page.     You will be asked to enter the access code listed below, as well as some personal information. Please follow the directions to create your username and password.     Your access code is: [email or web address]  Expires: 3/3/2019  8:48 AM     Your access code will  in 90 days. If you need help or a new code, please call your [place] clinic or [phone].        Care EveryWhere ID     This is your Care EveryWhere ID. This could be used by other organizations to access your [place] medical records  [id number]        Your Vitals Were     Pulse Temperature Respirations Height Pulse Oximetry BMI (Body Mass Index)    76 98.3  F (36.8  C) (Oral) 18 5' 4\" (1.626 m) 100% 21.8 kg/m2       Blood Pressure from Last 3 Encounters:   18 134/79   18 123/70   18 127/81    Weight from Last 3 Encounters:   18 127 lb (57.6 kg)   18 125 lb (56.7 kg)   18 126 lb (57.2 kg)              We Performed the Following     HC REMOVAL IMPACTED CERUMEN IRRIGATION/LVG UNILAT     HC ZOSTER VACCINE RECOMBINANT ADJUVANTED IM NJX        "   Today's Medication Changes          These changes are accurate as of 12/3/18  8:48 AM.  If you have any questions, ask your nurse or doctor.               These medicines have changed or have updated prescriptions.        Dose/Directions    EPINEPHrine 0.3 MG/0.3ML injection 2-pack   Commonly known as:  EPIPEN/ADRENACLICK/or ANY BX GENERIC EQUIV   This may have changed:  when to take this   Used for:  Allergy, food   Changed by:  Nelson Camp MD        Dose:  0.3 mg   Inject 0.3 mLs (0.3 mg) into the muscle as needed   Quantity:  0.6 mL   Refills:  1            Where to get your medicines      Some of these will need a paper prescription and others can be bought over the counter.  Ask your nurse if you have questions.     Bring a paper prescription for each of these medications     EPINEPHrine 0.3 MG/0.3ML injection 2-pack                Primary Care Provider Office Phone # Fax #    Nelson Camp [phone]       [address]  [facility] [address]        Equal Access to Services     : Hadii nora ku hadasho Soomaali, waaxda luqadaha, qaybta kaalmada adeegyada, waxay idiin haykaterina jewell . So [facility] [phone].    ATENCIÓN: Si habla español, tiene a cheatham disposición servicios gratuitos de asistencia lingüística. [facility] [phone].    We comply with applicable federal civil rights laws and [place] laws. We do not discriminate on the basis of race, color, national origin, age, disability, sex, sexual orientation, or gender identity.            Thank you!     Thank you for choosing [facility]  for your care. Our goal is always to provide you with excellent care. Hearing back from our patients is one way we can continue to improve our services. Please take a few minutes to complete the written survey that you may receive in the mail after your visit with us. Thank you!             Your Updated Medication List - Protect others around you:  Learn how to safely use, store and throw away your medicines at www.disposemymeds.org.          This list is accurate as of 12/3/18  8:48 AM.  Always use your most recent med list.                   Brand Name Dispense Instructions for use Diagnosis    BENADRYL ALLERGY PO      AS NEEDED FOR ALLERGIES        CLARITIN PO      AS NEEDED FOR ALLERGIES        conjugated estrogens 0.625 MG/GM vaginal cream    PREMARIN    30 g    Place 1 g vaginally twice a week    Postmenopausal       EPINEPHrine 0.3 MG/0.3ML injection 2-pack    EPIPEN/ADRENACLICK/or ANY BX GENERIC EQUIV    0.6 mL    Inject 0.3 mLs (0.3 mg) into the muscle as needed    Allergy, food       mometasone 50 MCG/ACT nasal spray    NASONEX    1 Box    Spray 2 sprays into both nostrils daily for allergy prevention.    Chronic seasonal allergic rhinitis due to pollen       nabumetone 500 MG tablet    RELAFEN    60 tablet    Take 2 tablets (1,000 mg) by mouth daily with food for hip pain.    Hip pain, left       prednisoLONE acetate 1 % ophthalmic suspension    PRED FORTE    1 Bottle    4 drops into both ears twice weekly    Chronic eczematous otitis externa of both ears       VENTOLIN  (90 Base) MCG/ACT inhaler   Generic drug:  albuterol

## 2018-12-03 NOTE — PATIENT INSTRUCTIONS
At [facility], we strive to deliver an exceptional experience to you, every time we see you.  If you receive a survey in the mail, please send us back your thoughts. We really do value your feedback.    Your care team:                            Family Medicine Internal Medicine   MD Eric Cheema MD Shantel Branch-Fleming, MD Katya Georgiev PA-C Megan Hill, APRN CNP    Marcus Stevenson, MD Pediatrics   Stephen Escobar, HERMAN Sheffield, MD Nafisa Carranza APRN CNP   MD Lupis Maddox MD Deborah Mielke, MD Arielle Bae, APRN [facility]      Clinic hours: Monday - Thursday 7 am-7 pm; Fridays 7 am-5 pm.   Urgent care: Monday - Friday 11 am-9 pm; Saturday and Sunday 9 am-5 pm.  Pharmacy : Monday -Thursday 8 am-8 pm; Friday 8 am-6 pm; Saturday and Sunday 9 am-5 pm.     Clinic: [phone]   Pharmacy: [phone]      Receive the final Shingles vaccine between 2/3/19 and 6/3/19. You received the first of 2 injections today.

## 2018-12-03 NOTE — PROGRESS NOTES
"  SUBJECTIVE:   Jhoana Hernandez is a 63 year old female who presents to clinic today for the following health issues:    Concern - Plugged Ear     When did it start?: a couple weekss    Any strain/injury, other illness, or event to trigger this problem?   no    Previous history of similar problem:   YES    Location:           Left Ear    Describe it:   Plugged    Severity: moderate    Progression of symptoms from onset until now:  worsening    Accompanying Signs & Symptoms:  Plugged Left Ear   What have you noticed that tends to make this worse?     None    What have you noticed that tends to make this better?     None    What have you done (this time) to try to treat this problem yourself?     Ear Drops    Did it help?     no         Joint Pain    Onset: about 1 week ago,     Description:   Location: right ankle  Character: pain with touch- not over ankle itself, but about 1-2 inches further up on right shin  Intensity: mild    Progression of Symptoms: better    Accompanying Signs & Symptoms:  Other symptoms: discoloration of ankle, bruising over tender to palpation site    History:   Previous similar pain: no       Precipitating factors:   Trauma or overuse: YES, \"twisted\" her right ankle going up stairs.    Alleviating factors:  Improved by: rest/inactivity    Therapies Tried and outcome: none    Patient reports that intense odors such as perfume or spicy smelling food will trigger her sinuses causing decreased hearing and sneezing. She had an Epipen prescribed, but notes that it has  and would like a refill.      Discussed Shingrex with patient today. She notes that she had an adverse reaction to the \"old shingles\" vaccination and is worried about another reaction with the new vaccination.     Past medical, family, and social histories, medications, and allergies are reviewed and updated in Epic.    ROS:  Constitutional, HEENT, cardiovascular, pulmonary, GI, , musculoskeletal, neuro, skin, " "endocrine and psych systems are negative, except as otherwise noted.    Any history above obtained by the Medical Assistant was reviewed by Dr. Nelson Camp MD, and edited when necessary.    This document serves as a record of the services and decisions personally performed and made by Dr. Camp. It was created on his behalf by Elizabeth Brown, a trained medical scribe. The creation of this document is based the provider's statements to the medical scribe.  December 3, 2018,  8:22 AM   OBJECTIVE:     /79 (BP Location: Left arm, Patient Position: Chair, Cuff Size: Adult Regular)  Pulse 76  Temp 98.3  F (36.8  C) (Oral)  Resp 18  Ht 1.626 m (5' 4\")  Wt 57.6 kg (127 lb)  SpO2 100%  BMI 21.8 kg/m2  Body mass index is 21.8 kg/(m^2).     GENERAL: healthy, alert and no distress  HENT: left ear canal is completed impacted with cerumen. Following irrigation, TM and canal clear without any inflammation. Right ear canal has some cerumen, but it is not completely occlusive, nose and mouth without ulcers or lesions  MS: no gross musculoskeletal defects noted, no edema  SKIN: 1-2 cm ecchymosis on the medial right ankle, 5 cm proximal to medial malleolus.    ASSESSMENT/PLAN:   (H61.23) Bilateral impacted cerumen  (primary encounter diagnosis)  Comment: resolved following irrigation by my MA  Plan: HC REMOVAL IMPACTED CERUMEN IRRIGATION/LVG         UNILAT          (S80.11XA) Contusion of right lower leg, initial encounter  Comment: minor  Plan: expect spontaneous resolution after about 2 more weeks    (Z91.018) Allergy, food  Comment:   Plan: EPINEPHrine (EPIPEN/ADRENACLICK/OR ANY BX         GENERIC EQUIV) 0.3 MG/0.3ML injection 2-pack        Updated prescription for  injector    (Z23) Need for shingles vaccine  Comment:   Plan: HC ZOSTER VACCINE RECOMBINANT ADJUVANTED IM NJX    (Z28.21) Vaccination not carried out because of patient refusal  Comment:   Plan: Patient declined flu shot today.      The " information in this document, created by the medical scribe for me, accurately reflects the services I personally performed and the decisions made by me. I have reviewed and approved this document for accuracy prior to leaving the patient care area.  Nelson Camp MD

## 2018-12-03 NOTE — NURSING NOTE
Both ear lavage completed in clinic.  Results are unsuccessful, due to patient is having pain, provider notified.    NATASHA Roy MA

## 2019-01-09 ENCOUNTER — OFFICE VISIT (OUTPATIENT)
Dept: FAMILY MEDICINE | Facility: CLINIC | Age: 64
End: 2019-01-09
Payer: COMMERCIAL

## 2019-01-09 VITALS
BODY MASS INDEX: 21.51 KG/M2 | HEART RATE: 70 BPM | HEIGHT: 64 IN | DIASTOLIC BLOOD PRESSURE: 84 MMHG | OXYGEN SATURATION: 100 % | SYSTOLIC BLOOD PRESSURE: 138 MMHG | WEIGHT: 126 LBS | TEMPERATURE: 98.3 F

## 2019-01-09 DIAGNOSIS — L50.9 URTICARIA: ICD-10-CM

## 2019-01-09 DIAGNOSIS — L29.2 ITCHING OF VULVA: Primary | ICD-10-CM

## 2019-01-09 LAB
SPECIMEN SOURCE: NORMAL
WET PREP SPEC: NORMAL

## 2019-01-09 PROCEDURE — 99214 OFFICE O/P EST MOD 30 MIN: CPT | Performed by: NURSE PRACTITIONER

## 2019-01-09 PROCEDURE — 87210 SMEAR WET MOUNT SALINE/INK: CPT | Performed by: NURSE PRACTITIONER

## 2019-01-09 RX ORDER — HYDROCORTISONE 2.5 %
CREAM (GRAM) TOPICAL
Qty: 30 G | Refills: 0 | Status: SHIPPED | OUTPATIENT
Start: 2019-01-09 | End: 2023-05-22

## 2019-01-09 ASSESSMENT — PAIN SCALES - GENERAL: PAINLEVEL: NO PAIN (0)

## 2019-01-09 ASSESSMENT — MIFFLIN-ST. JEOR: SCORE: 1106.53

## 2019-01-09 NOTE — PATIENT INSTRUCTIONS
At [facility], we strive to deliver an exceptional experience to you, every time we see you.  If you receive a survey in the mail, please send us back your thoughts. We really do value your feedback.    Based on your medical history, these are the current health maintenance/preventive care services that you are due for (some may have been done at this visit.)  Health Maintenance Due   Topic Date Due     ADVANCE DIRECTIVE PLANNING Q5 YRS  04/03/2017     INFLUENZA VACCINE (1) 09/01/2018     ZOSTER IMMUNIZATION (3 of 3) 01/28/2019         Suggested websites for health information:  Www.[employer] : Up to date and easily searchable information on multiple topics.  Www.medlineplus.gov : medication info, interactive tutorials, watch real surgeries online  Www.familydoctor.org : good info from the Academy of Family Physicians  Www.cdc.gov : public health info, travel advisories, epidemics (H1N1)  Www.aap.org : children's health info, normal development, vaccinations  Www.health.[facility].[place].us : [place] dept of health, public health issues in [place], N1N1    Your care team:                            Family Medicine Internal Medicine   MD Eric Cheema MD Shantel Branch-Fleming, MD Katya Georgiev PA-C Nam Ho, MD Pediatrics   HERMAN Draper, MD Lupis Loo CNP, MD Deborah Mielke, MD Kim Thein, APRN CNP      Clinic hours: Monday - Thursday 7 am-7 pm; Fridays 7 am-5 pm.   Urgent care: Monday - Friday 11 am-9 pm; Saturday and Sunday 9 am-5 pm.  Pharmacy : Monday -Thursday 8 am-8 pm; Friday 8 am-6 pm; Saturday and Sunday 9 am-5 pm.     Clinic: [phone]   Pharmacy: [phone]

## 2019-01-09 NOTE — PROGRESS NOTES
SUBJECTIVE:   Jhoana Hernandez is a 63 year old female who presents to clinic today for the following health issues:      Vaginal Symptoms  Onset: 1-2 months    Description:  Vaginal Discharge: none   Itching (Pruritis): YES  Burning sensation:  no   Odor: no     Accompanying Signs & Symptoms:  Pain with Urination: no   Abdominal Pain: no   Fever: no     History:   Sexually active: YES  New Partner: no   Possibility of Pregnancy:  No    Precipitating factors:   Recent Antibiotic Use: no     Alleviating factors:  None    Therapies Tried and outcome: None    No discharge. Itching worse in AM. Ivory soap now has coconut oil. New underwear. Changed shampoo to free and clear. More on the skin. No symptoms inside. Very sensitive skin.    Hives x 1-2 months  . On neck  . Started new soap 1-2 months ago    Hives gone now. Wants referral to allergist as she has several foods, lotions, etc that cause her to react and have itchy skin. Takes diphenhydramine (Benadryl) at times but makes her very sleepy. No trouble breathing or swallowing although she does carry an EpiPen at all times as she never knows what she might react to.    Problem list and histories reviewed & adjusted, as indicated.  Additional history: as documented    Patient Active Problem List   Diagnosis     CARDIOVASCULAR SCREENING; LDL GOAL LESS THAN 160     Lactose intolerance     Seasonal allergic rhinitis     Allergy, food     Advanced directives, counseling/discussion     Subclinical hypothyroidism     Osteopenia     Hollenhorst plaque, left eye     Family history of breast cancer     Past Surgical History:   Procedure Laterality Date     C TOTAL ABDOM HYSTERECTOMY      with BSO      SECTION  ,      COLONOSCOPY  2012    Procedure: COLONOSCOPY;  COLONOSCOPY, ABDOMINAL PAIN;  Surgeon: Maco Mascorro MD;  Location: [facility] OR     HC EXCISION BREAST LESION, OPEN >=1      LT Benign     HYSTERECTOMY, PAP NO LONGER INDICATED   2011    JOSAFAT with BSO- benign bilateral serous cystadenomas     LAPAROSCOPY  1986    ov cystectomy       Social History     Tobacco Use     Smoking status: Never Smoker     Smokeless tobacco: Never Used   Substance Use Topics     Alcohol use: Yes     Comment: occassionally     Family History   Problem Relation Age of Onset     Asthma Sister      Diabetes Sister         Type II (questionable)     Breast Cancer Sister 60     Lipids Mother      Cerebrovascular Disease Father      Thyroid Disease Other      C.A.D. No family hx of      Hypertension No family hx of      Cancer - colorectal No family hx of      Prostate Cancer No family hx of          Current Outpatient Medications   Medication Sig Dispense Refill     BENADRYL ALLERGY OR AS NEEDED FOR ALLERGIES       CLARITIN OR AS NEEDED FOR ALLERGIES       conjugated estrogens (PREMARIN) vaginal cream Place 1 g vaginally twice a week 30 g 3     EPINEPHrine (EPIPEN/ADRENACLICK/OR ANY BX GENERIC EQUIV) 0.3 MG/0.3ML injection 2-pack Inject 0.3 mLs (0.3 mg) into the muscle as needed 0.6 mL 1     hydrocortisone 2.5 % cream Apply sparingly to affected area 2 times daily for no more than 14 days 30 g 0     mometasone (NASONEX) 50 MCG/ACT spray Spray 2 sprays into both nostrils daily for allergy prevention. 1 Box 3     nabumetone (RELAFEN) 500 MG tablet Take 2 tablets (1,000 mg) by mouth daily with food for hip pain. 60 tablet 1     prednisoLONE acetate (PRED FORTE) 1 % ophthalmic susp 4 drops into both ears twice weekly 1 Bottle 5     VENTOLIN  (90 Base) MCG/ACT inhaler   0     Allergies   Allergen Reactions     Aloe Vera      Fish      Fluticasone      Ibuprofen Sodium      Macrodantin [Nitrofuran Derivatives]      Mineral Oil      Nuts      Seafood      Shellfish Allergy      Sulfa Drugs      Yellow Dye        Reviewed and updated as needed this visit by clinical staff  Tobacco  Allergies  Meds  Problems  Med Hx  Surg Hx  Fam Hx  Soc Hx        Reviewed and  "updated as needed this visit by Provider  Tobacco  Allergies  Meds  Problems  Med Hx  Surg Hx  Fam Hx         ROS:  Constitutional, HEENT, cardiovascular, pulmonary, gi and gu systems are negative, except as otherwise noted.    OBJECTIVE:     /84 (BP Location: Right arm, Patient Position: Chair, Cuff Size: Adult Regular)   Pulse 70   Temp 98.3  F (36.8  C) (Oral)   Ht 1.626 m (5' 4\")   Wt 57.2 kg (126 lb)   LMP  (LMP Unknown)   SpO2 100%   BMI 21.63 kg/m    Body mass index is 21.63 kg/m .  GENERAL: healthy, alert and no distress  EYES: Eyes grossly normal to inspection, PERRL and conjunctivae and sclerae normal  HENT: ear canals and TM's normal, nose and mouth without ulcers or lesions  NECK: no adenopathy, no asymmetry, masses, or scars and thyroid normal to palpation  RESP: lungs clear to auscultation - no rales, rhonchi or wheezes  CV: regular rate and rhythm, normal S1 S2, no S3 or S4, no murmur, click or rub, no peripheral edema and peripheral pulses strong  ABDOMEN: soft, nontender, no hepatosplenomegaly, no masses and bowel sounds normal   (female): normal female external genitalia, normal urethral meatus, vaginal mucosa, normal cervix/adnexa/uterus without masses or discharge  MS: no gross musculoskeletal defects noted, no edema  SKIN: vulvva is erythematous and appears excoriated - no ulcerative lesions  PSYCH: mentation appears normal, affect normal/bright    Diagnostic Test Results:  Results for orders placed or performed in visit on 01/09/19 (from the past 24 hour(s))   Wet prep   Result Value Ref Range    Specimen Description Vagina     Wet Prep No Trichomonas seen     Wet Prep No clue cells seen     Wet Prep No yeast seen        ASSESSMENT/PLAN:     1. Itching of vulva  Doesn't appear fungal. Will trial the below. If worsening can consider adding clotrimazole. Advised to use with caution in that area and avoid long term use.   - Wet prep  - hydrocortisone 2.5 % cream; Apply " sparingly to affected area 2 times daily for no more than 14 days  Dispense: 30 g; Refill: 0    2. Urticaria  Requesting allergy referral.   - ALLERGY/ASTHMA ADULT REFERRAL    See Patient Instructions    The benefits, risks and potential side effects were discussed in detail. Black box warnings discussed as relevant. All patient questions were answered. The patient was instructed to follow up immediately if any adverse reactions develop.    Patient verbalizes understanding and agrees with plan of care. Patient stable for discharge.      JOHNNIE Dodson [facility]

## 2019-01-19 ENCOUNTER — OFFICE VISIT (OUTPATIENT)
Dept: URGENT CARE | Facility: URGENT CARE | Age: 64
End: 2019-01-19
Payer: COMMERCIAL

## 2019-01-19 VITALS
BODY MASS INDEX: 20.94 KG/M2 | OXYGEN SATURATION: 99 % | DIASTOLIC BLOOD PRESSURE: 83 MMHG | SYSTOLIC BLOOD PRESSURE: 144 MMHG | HEART RATE: 109 BPM | WEIGHT: 122 LBS | RESPIRATION RATE: 18 BRPM | TEMPERATURE: 98 F

## 2019-01-19 DIAGNOSIS — L03.116 CELLULITIS OF LEFT LOWER EXTREMITY: Primary | ICD-10-CM

## 2019-01-19 PROCEDURE — 99213 OFFICE O/P EST LOW 20 MIN: CPT | Performed by: NURSE PRACTITIONER

## 2019-01-19 RX ORDER — CEFUROXIME AXETIL 250 MG/1
250 TABLET ORAL 2 TIMES DAILY
Qty: 14 TABLET | Refills: 0 | Status: SHIPPED | OUTPATIENT
Start: 2019-01-19 | End: 2019-01-22

## 2019-01-19 ASSESSMENT — ENCOUNTER SYMPTOMS
JOINT SWELLING: 1
CHILLS: 1
FEVER: 1
FATIGUE: 1
WOUND: 1
COLOR CHANGE: 1

## 2019-01-19 NOTE — PROGRESS NOTES
"SUBJECTIVE:   Jhoana Hernandez is a 63 year old female presenting with a chief complaint of   Chief Complaint   Patient presents with     Musculoskeletal Problem     Since yesterday pt has been having left swollen foot no injuries but has been having chills and pain/discomfort        She is an established patient of [place].    Bite/Sting    Onset of symptoms was 1 day ago  Course of illness is worsening.    Severity moderate, 4/10 on the numeric pain scale  Location: LEFT Great toe/foot  Context:  cleaning basement, concerned about possible spider bite or other insect bite causing \"a skin infection\".  Current and Associated symptoms: redness, warmth, swelling and pain  Treatment measures tried include: nothing  Relief from treatment: none tried  Significant past medical history: history of prior reactions with similar cellulitis    Review of Systems   Constitutional: Positive for chills, fatigue and fever.   Musculoskeletal: Positive for joint swelling.   Skin: Positive for color change and wound.   All other systems reviewed and are negative.      Past Medical History:   Diagnosis Date     Gastritis      IBS (irritable bowel syndrome)      Infertility, female 1989    had IVF     Lactose intolerance      Osteopenia 11/4/2011     Ovarian cyst 2011    bilateral serous cystadenomas     Recurrent UTI      Seasonal allergic rhinitis      Family History   Problem Relation Age of Onset     Asthma Sister      Diabetes Sister         Type II (questionable)     Breast Cancer Sister 60     Lipids Mother      Cerebrovascular Disease Father      Thyroid Disease Other      C.A.D. No family hx of      Hypertension No family hx of      Cancer - colorectal No family hx of      Prostate Cancer No family hx of      Current Outpatient Medications   Medication Sig Dispense Refill     BENADRYL ALLERGY OR AS NEEDED FOR ALLERGIES       cefuroxime (CEFTIN) 250 MG tablet Take 1 tablet (250 mg) by mouth 2 times daily for 7 days 14 " tablet 0     CLARITIN OR AS NEEDED FOR ALLERGIES       conjugated estrogens (PREMARIN) vaginal cream Place 1 g vaginally twice a week 30 g 3     EPINEPHrine (EPIPEN/ADRENACLICK/OR ANY BX GENERIC EQUIV) 0.3 MG/0.3ML injection 2-pack Inject 0.3 mLs (0.3 mg) into the muscle as needed 0.6 mL 1     hydrocortisone 2.5 % cream Apply sparingly to affected area 2 times daily for no more than 14 days 30 g 0     mometasone (NASONEX) 50 MCG/ACT spray Spray 2 sprays into both nostrils daily for allergy prevention. 1 Box 3     nabumetone (RELAFEN) 500 MG tablet Take 2 tablets (1,000 mg) by mouth daily with food for hip pain. 60 tablet 1     prednisoLONE acetate (PRED FORTE) 1 % ophthalmic susp 4 drops into both ears twice weekly 1 Bottle 5     VENTOLIN  (90 Base) MCG/ACT inhaler   0     Social History     Tobacco Use     Smoking status: Never Smoker     Smokeless tobacco: Never Used   Substance Use Topics     Alcohol use: Yes     Comment: occassionally       OBJECTIVE  /83   Pulse 109   Temp 98  F (36.7  C) (Oral)   Resp 18   Wt 55.3 kg (122 lb)   LMP  (LMP Unknown)   SpO2 99%   BMI 20.94 kg/m      Physical Exam   Constitutional: She is oriented to person, place, and time. No distress.   Cardiovascular: Normal rate, regular rhythm, normal heart sounds and intact distal pulses.   No murmur heard.  Pulmonary/Chest: Effort normal and breath sounds normal. No respiratory distress. She has no wheezes.   Neurological: She is alert and oriented to person, place, and time.   Skin: Skin is warm. Capillary refill takes less than 2 seconds. There is erythema.   RIGHT Great to to mid foot warm with extensive erythema, tender at lateral PIP. No open skin. No ecchymosis. FROM, sensation intact, pulse 2+ and no calf tenderness.    Psychiatric: She has a normal mood and affect.       Labs:  No results found for this or any previous visit (from the past 24 hour(s)).      ASSESSMENT:    ICD-10-CM    1. Cellulitis of left lower  extremity L03.116 cefuroxime (CEFTIN) 250 MG tablet        Medical Decision Making:    Differential Diagnosis:  Insect Bite: Insect sting and Cellulitis    Serious Comorbid Conditions:  Adult:  None    PLAN: Discussed with patient overall improvement of cellulitis, consider warm compress and discussed course of antibiotics. Advised close monitoring of worsening infection as reviewed. Continue passive stretches and elevation of foot. Education provided. Patient agreed to the plan of care with no further questions or concerns.       Followup: If not improving or if condition worsens, follow up with your Primary Care Provider in 1 week as needed.     Kerrie Owens APRN, CNP      Patient Instructions     Patient Education     Cellulitis  Cellulitis is an infection of the deep layers of skin. A break in the skin, such as a cut or scratch, can let bacteria under the skin. If the bacteria get to deep layers of the skin, it can be serious. If not treated, cellulitis can get into the bloodstream and lymph nodes. The infection can then spread throughout the body. This causes serious illness.  Cellulitis causes the affected skin to become red, swollen, warm, and sore. The reddened areas have a visible border. An open sore may leak fluid (pus). You may have a fever, chills, and pain.  Cellulitis is treated with antibiotics taken for 7 to 10 days. An open sore may be cleaned and covered with cool wet gauze. Symptoms should get better 1 to 2 days after treatment is started. Make sure to take all the antibiotics for the full number of days until they are gone. Keep taking the medicine even if your symptoms go away.  Home care  Follow these tips:    Limit the use of the part of your body with cellulitis.     If the infection is on your leg, keep your leg raised while sitting. This will help to reduce swelling.    Take all of the antibiotic medicine exactly as directed until it is gone. Do not miss any doses, especially during  the first 7 days. Don t stop taking the medicine when your symptoms get better.    Keep the affected area clean and dry.    Wash your hands with soap and warm water before and after touching your skin. Anyone else who touches your skin should also wash his or her hands. Don't share towels.  Follow-up care  Follow up with your healthcare provider, or as advised. If your infection does not go away on the first antibiotic, your healthcare provider will prescribe a different one.  When to seek medical advice  Call your healthcare provider right away if any of these occur:    Red areas that spread    Swelling or pain that gets worse    Fluid leaking from the skin (pus)    Fever higher of 100.4  F (38.0  C) or higher after 2 days on antibiotics  Date Last Reviewed: 9/1/2016 2000-2018 [employer]. [address] [place] [address], [place] [address]. All rights reserved. This information is not intended as a substitute for professional medical care. Always follow your healthcare professional's instructions.

## 2019-01-19 NOTE — PATIENT INSTRUCTIONS
Patient Education     Cellulitis  Cellulitis is an infection of the deep layers of skin. A break in the skin, such as a cut or scratch, can let bacteria under the skin. If the bacteria get to deep layers of the skin, it can be serious. If not treated, cellulitis can get into the bloodstream and lymph nodes. The infection can then spread throughout the body. This causes serious illness.  Cellulitis causes the affected skin to become red, swollen, warm, and sore. The reddened areas have a visible border. An open sore may leak fluid (pus). You may have a fever, chills, and pain.  Cellulitis is treated with antibiotics taken for 7 to 10 days. An open sore may be cleaned and covered with cool wet gauze. Symptoms should get better 1 to 2 days after treatment is started. Make sure to take all the antibiotics for the full number of days until they are gone. Keep taking the medicine even if your symptoms go away.  Home care  Follow these tips:    Limit the use of the part of your body with cellulitis.     If the infection is on your leg, keep your leg raised while sitting. This will help to reduce swelling.    Take all of the antibiotic medicine exactly as directed until it is gone. Do not miss any doses, especially during the first 7 days. Don t stop taking the medicine when your symptoms get better.    Keep the affected area clean and dry.    Wash your hands with soap and warm water before and after touching your skin. Anyone else who touches your skin should also wash his or her hands. Don't share towels.  Follow-up care  Follow up with your healthcare provider, or as advised. If your infection does not go away on the first antibiotic, your healthcare provider will prescribe a different one.  When to seek medical advice  Call your healthcare provider right away if any of these occur:    Red areas that spread    Swelling or pain that gets worse    Fluid leaking from the skin (pus)    Fever higher of 100.4  F (38.0  C) or  higher after 2 days on antibiotics  Date Last Reviewed: 9/1/2016 2000-2018 [employer]. [address] [place] [address], [place] [address]. All rights reserved. This information is not intended as a substitute for professional medical care. Always follow your healthcare professional's instructions.

## 2019-01-22 ENCOUNTER — OFFICE VISIT (OUTPATIENT)
Dept: FAMILY MEDICINE | Facility: CLINIC | Age: 64
End: 2019-01-22
Payer: COMMERCIAL

## 2019-01-22 VITALS
BODY MASS INDEX: 21.34 KG/M2 | DIASTOLIC BLOOD PRESSURE: 79 MMHG | HEART RATE: 77 BPM | SYSTOLIC BLOOD PRESSURE: 140 MMHG | HEIGHT: 64 IN | OXYGEN SATURATION: 99 % | RESPIRATION RATE: 16 BRPM | WEIGHT: 125 LBS | TEMPERATURE: 98.4 F

## 2019-01-22 DIAGNOSIS — L03.116 CELLULITIS OF LEFT LOWER EXTREMITY: ICD-10-CM

## 2019-01-22 PROCEDURE — 99213 OFFICE O/P EST LOW 20 MIN: CPT | Performed by: FAMILY MEDICINE

## 2019-01-22 RX ORDER — CEFUROXIME AXETIL 250 MG/1
250 TABLET ORAL 2 TIMES DAILY
Qty: 6 TABLET | Refills: 0 | Status: SHIPPED | OUTPATIENT
Start: 2019-01-22 | End: 2019-01-25

## 2019-01-22 ASSESSMENT — MIFFLIN-ST. JEOR: SCORE: 1102

## 2019-01-22 ASSESSMENT — PAIN SCALES - GENERAL: PAINLEVEL: MILD PAIN (2)

## 2019-01-22 NOTE — PROGRESS NOTES
"  SUBJECTIVE:   Jhoana Hernandez is a 63 year old female who presents to clinic today for the following health issues:    '  Concern - Left Foot     When did it start?: 1/18/19    Any strain/injury, other illness, or event to trigger this problem?   no    Previous history of similar problem:   no      Location:           Left foot    Describe it:   Swelling, can't move big toe, red    Severity: moderate      Progression of symptoms from onset until now:  improving      Accompanying Signs & Symptoms:  Swelling, can't move big toe & red   What have you noticed that tends to make this worse?     None      What have you noticed that tends to make this better?     None      What have you done (this time) to try to treat this problem yourself?     Seen in urgent care 1/19/19 put on Ceftin      Did it help?     YES         Patient reports that she has been improving. She has not yet returned to work. She is concerned because the skin on her foot is very dry.       Past medical, family, and social histories, medications, and allergies are reviewed and updated in Epic.     ROS:  Constitutional, HEENT, cardiovascular, pulmonary, gi and gu systems are negative, except as otherwise noted.    This document serves as a record of the services and decisions personally performed by CLAUDIA GUTIERRES. It was created on his/her behalf by Ximena Day, a trained medical scribe. The creation of this document is based on the provider's statements to the medical scribe. Ximena Day, January 22, 2019 9:17 AM    OBJECTIVE:     /79   Pulse 77   Temp 98.4  F (36.9  C) (Oral)   Resp 16   Ht 1.626 m (5' 4\")   Wt 56.7 kg (125 lb)   LMP  (LMP Unknown)   SpO2 99%   BMI 21.46 kg/m    Body mass index is 21.46 kg/m .  GENERAL: healthy, alert and no distress  EYES: Eyes grossly normal to inspection, PERRL and conjunctivae and sclerae normal  MS: left foot first MTP joint swelling (versus bunion) with a slight hint of recent erythema, " no gross musculoskeletal defects noted, no edema of the rest of the foot. No pain with passive movement of the affected joint.   SKIN: no suspicious lesions or rashes  NEURO: Normal strength and tone, mentation intact and speech normal, cranial nerves 2-12 intact   PSYCH: mentation appears normal, affect normal/bright     Diagnostic Test Results:  No results found for this or any previous visit (from the past 24 hour(s)).    ASSESSMENT/PLAN:     (L03.116) Cellulitis of left lower extremity  Comment: Rule out gout, although she responded well to an antibiotic without any other gout treatment.   Plan: cefuroxime (CEFTIN) 250 MG tablet        Antibiotic extended to 10 days. Please see the letter filed in the Letters section. Follow up PRN.    The information in this document, created by the medical scribe Ximena Day for me, accurately reflects the services I personally performed and the decisions made by me. I have reviewed and approved this document for accuracy prior to leaving the patient care area.    Nelson Camp MD  [facility]

## 2019-01-22 NOTE — LETTER
January 22, 2019        Jhoana Hernandez  [address] [place] [address]          To whom it may concern:    RE: Jhoana Hernandez    Patient was seen and treated today at our clinic and missed work (or may choose reduced hours) 1/22/19-1/25/19 due to illness (foot infection).     Please contact me for questions or concerns.      Sincerely,        Nelson Camp MD

## 2019-01-22 NOTE — PATIENT INSTRUCTIONS
================================================================================  Normal Values   Blood pressure  <140/90 for most adults    <130/80 for some chronic diseases (ask your care team about yours)    BMI (body mass index)  18.5-25 kg/m2 (based on height and weight)     Thank you for visiting [facility]    Normal or non-critical lab and imaging results will be communicated to you by MyChart, letter or phone within 7 days.  If you do not hear from us within 10 days, please call the clinic. If you have a critical or abnormal lab result, we will notify you by phone as soon as possible.     If you have any questions regarding your visit please contact:     Team Comfort:   Clinic Hours Telephone Number   Dr. Nelson [address] Dr. [facility] 7am-5pm  Monday - Friday [phone]  Jacobo RN  Tova RN  Ernestina RN   Pharmacy 8:00am-8pm Monday-Friday    9am-5pm Saturday-Sunday [phone]   Urgent Care 11am-9pm Monday-Friday        9am-5pm Saturday-Sunday [phone]     After hours, weekend or if you need to make an appointment with your primary provider please call [phone].   After Hours nurse advise: call [facility]: [phone]    Medication Refills:  Call your pharmacy and they will forward the refill to us. Please allow 3 business days for your refills to be completed.      Please receive your second shingles vaccine between 2/3/19 and 6/3/19.

## 2019-03-05 ENCOUNTER — ALLIED HEALTH/NURSE VISIT (OUTPATIENT)
Dept: NURSING | Facility: CLINIC | Age: 64
End: 2019-03-05
Payer: COMMERCIAL

## 2019-03-05 DIAGNOSIS — Z23 NEED FOR SHINGLES VACCINE: Primary | ICD-10-CM

## 2019-03-05 PROCEDURE — 99207 ZZC NO CHARGE NURSE ONLY: CPT

## 2019-03-05 PROCEDURE — 90471 IMMUNIZATION ADMIN: CPT

## 2019-03-05 PROCEDURE — 90750 HZV VACC RECOMBINANT IM: CPT

## 2019-03-05 NOTE — NURSING NOTE
Screening Questionnaire for Adult Immunization    Are you sick today?   No   Do you have allergies to medications, food, a vaccine component or latex?   Yes   Have you ever had a serious reaction after receiving a vaccination?   No   Do you have a long-term health problem with heart disease, lung disease, asthma, kidney disease, metabolic disease (e.g. diabetes), anemia, or other blood disorder?   No   Do you have cancer, leukemia, HIV/AIDS, or any other immune system problem?   No   In the past 3 months, have you taken medications that affect  your immune system, such as prednisone, other steroids, or anticancer drugs; drugs for the treatment of rheumatoid arthritis, Crohn s disease, or psoriasis; or have you had radiation treatments?   No   Have you had a seizure, or a brain or other nervous system problem?   No   During the past year, have you received a transfusion of blood or blood     products, or been given immune (gamma) globulin or antiviral drug?   No   For women: Are you pregnant or is there a chance you could become        pregnant during the next month?   No   Have you received any vaccinations in the past 4 weeks?   No     Immunization questionnaire was positive for at least one answer.        Per orders of Dr. Camp, injection of shingrix given by Trinidad Colvin. Patient instructed to remain in clinic for 15 minutes afterwards, and to report any adverse reaction to me immediately.       Screening performed by Trinidad Colvin on 3/5/2019 at 11:17 AM.

## 2019-04-26 ENCOUNTER — OFFICE VISIT (OUTPATIENT)
Dept: FAMILY MEDICINE | Facility: CLINIC | Age: 64
End: 2019-04-26
Payer: COMMERCIAL

## 2019-04-26 VITALS
SYSTOLIC BLOOD PRESSURE: 136 MMHG | BODY MASS INDEX: 21.92 KG/M2 | DIASTOLIC BLOOD PRESSURE: 72 MMHG | TEMPERATURE: 97.6 F | OXYGEN SATURATION: 100 % | HEIGHT: 64 IN | HEART RATE: 77 BPM | WEIGHT: 128.4 LBS

## 2019-04-26 DIAGNOSIS — H61.22 IMPACTED CERUMEN OF LEFT EAR: Primary | ICD-10-CM

## 2019-04-26 DIAGNOSIS — H60.8X3 CHRONIC ECZEMATOUS OTITIS EXTERNA OF BOTH EARS: ICD-10-CM

## 2019-04-26 PROCEDURE — 99213 OFFICE O/P EST LOW 20 MIN: CPT | Mod: 25 | Performed by: NURSE PRACTITIONER

## 2019-04-26 PROCEDURE — 69209 REMOVE IMPACTED EAR WAX UNI: CPT | Mod: LT | Performed by: NURSE PRACTITIONER

## 2019-04-26 RX ORDER — PREDNISOLONE ACETATE 10 MG/ML
SUSPENSION/ DROPS OPHTHALMIC
Qty: 1 BOTTLE | Refills: 5 | Status: SHIPPED | OUTPATIENT
Start: 2019-04-26 | End: 2019-09-09

## 2019-04-26 ASSESSMENT — PAIN SCALES - GENERAL: PAINLEVEL: NO PAIN (0)

## 2019-04-26 ASSESSMENT — MIFFLIN-ST. JEOR: SCORE: 1112.42

## 2019-04-26 NOTE — PROGRESS NOTES
SUBJECTIVE:   Jhoana Hernandez is a 64 year old female who presents to clinic today for the following   health issues:    Concern - Plugged Ear  Onset: has had wax build up in ears a few times. Could possibly be ear wax, per pt    Description:   Plugged left ear. Has had the same issue a few times and had it cleaned out before. No pain, no drainage.       Therapies Tried and outcome: Ear lavage by providers office and using ear drops     Has build up a few times a year.  Does see Dr. david who prescribed prednisolone for eczematous otitis externa.  Prednisolone is helpful for this to delay build up.  Debrox not effective.      Additional history: as documented    Reviewed  and updated as needed this visit by clinical staff  Tobacco  Allergies  Med Hx  Surg Hx  Fam Hx  Soc Hx        Reviewed and updated as needed this visit by Provider         Patient Active Problem List   Diagnosis     CARDIOVASCULAR SCREENING; LDL GOAL LESS THAN 160     Lactose intolerance     Seasonal allergic rhinitis     Allergy, food     Advanced directives, counseling/discussion     Subclinical hypothyroidism     Osteopenia     Hollenhorst plaque, left eye     Family history of breast cancer     Chronic eczematous otitis externa of both ears     Past Surgical History:   Procedure Laterality Date     C TOTAL ABDOM HYSTERECTOMY      with BSO      SECTION  ,      COLONOSCOPY  2012    Procedure: COLONOSCOPY;  COLONOSCOPY, ABDOMINAL PAIN;  Surgeon: Maco Mascorro MD;  Location: [facility] OR     HC EXCISION BREAST LESION, OPEN >=1      LT Benign     HYSTERECTOMY, PAP NO LONGER INDICATED      JOSAFAT with BSO- benign bilateral serous cystadenomas     LAPAROSCOPY      ov cystectomy       Social History     Tobacco Use     Smoking status: Never Smoker     Smokeless tobacco: Never Used   Substance Use Topics     Alcohol use: Yes     Comment: occassionally     Family History   Problem Relation Age of Onset      Asthma Sister      Diabetes Sister         Type II (questionable)     Breast Cancer Sister 60     Lipids Mother      Cerebrovascular Disease Father      Thyroid Disease Other      C.A.D. No family hx of      Hypertension No family hx of      Cancer - colorectal No family hx of      Prostate Cancer No family hx of          Current Outpatient Medications   Medication Sig Dispense Refill     prednisoLONE acetate (PRED FORTE) 1 % ophthalmic suspension 4 drops into both ears twice weekly 1 Bottle 5     BENADRYL ALLERGY OR AS NEEDED FOR ALLERGIES       CLARITIN OR AS NEEDED FOR ALLERGIES       conjugated estrogens (PREMARIN) vaginal cream Place 1 g vaginally twice a week 30 g 3     EPINEPHrine (EPIPEN/ADRENACLICK/OR ANY BX GENERIC EQUIV) 0.3 MG/0.3ML injection 2-pack Inject 0.3 mLs (0.3 mg) into the muscle as needed 0.6 mL 1     hydrocortisone 2.5 % cream Apply sparingly to affected area 2 times daily for no more than 14 days 30 g 0     mometasone (NASONEX) 50 MCG/ACT spray Spray 2 sprays into both nostrils daily for allergy prevention. 1 Box 3     nabumetone (RELAFEN) 500 MG tablet Take 2 tablets (1,000 mg) by mouth daily with food for hip pain. 60 tablet 1     VENTOLIN  (90 Base) MCG/ACT inhaler   0     Allergies   Allergen Reactions     Aloe Vera      Fish      Fluticasone      Ibuprofen Sodium      Macrodantin [Nitrofuran Derivatives]      Mineral Oil      Nuts      Seafood      Shellfish Allergy      Sulfa Drugs      Yellow Dye      BP Readings from Last 3 Encounters:   04/26/19 136/72   01/22/19 140/79   01/19/19 144/83    Wt Readings from Last 3 Encounters:   04/26/19 58.2 kg (128 lb 6.4 oz)   01/22/19 56.7 kg (125 lb)   01/19/19 55.3 kg (122 lb)                    ROS:  Constitutional, HEENT, cardiovascular, pulmonary, gi and gu systems are negative, except as otherwise noted.    OBJECTIVE:     /72 (BP Location: Left arm, Patient Position: Chair, Cuff Size: Adult Regular)   Pulse 77   Temp 97.6  " F (36.4  C) (Oral)   Ht 1.626 m (5' 4\")   Wt 58.2 kg (128 lb 6.4 oz)   LMP  (LMP Unknown)   SpO2 100%   Breastfeeding? No   BMI 22.04 kg/m    Body mass index is 22.04 kg/m .  GENERAL: healthy, alert and no distress  HENT: normal cephalic/atraumatic, right ear: small amount of wax in canal, TM visible and WNL, left ear: occluded with wax, nose and mouth without ulcers or lesions, oropharynx clear and oral mucous membranes moist  NECK: no adenopathy, no asymmetry, masses, or scars and thyroid normal to palpation    Diagnostic Test Results:  none     ASSESSMENT/PLAN:     1. Chronic eczematous otitis externa of both ears  Refilled.   - prednisoLONE acetate (PRED FORTE) 1 % ophthalmic suspension; 4 drops into both ears twice weekly  Dispense: 1 Bottle; Refill: 5    2. Impacted cerumen of left ear  Cleared with irrigation.  Normal TMs bilaterally.   - REMOVE IMPACTED CERUMEN      JOHNNIE Burns [facility]      "

## 2019-09-04 ENCOUNTER — ANCILLARY PROCEDURE (OUTPATIENT)
Dept: MAMMOGRAPHY | Facility: CLINIC | Age: 64
End: 2019-09-04
Attending: FAMILY MEDICINE
Payer: COMMERCIAL

## 2019-09-04 DIAGNOSIS — Z12.31 VISIT FOR SCREENING MAMMOGRAM: ICD-10-CM

## 2019-09-04 PROCEDURE — 77067 SCR MAMMO BI INCL CAD: CPT | Performed by: STUDENT IN AN ORGANIZED HEALTH CARE EDUCATION/TRAINING PROGRAM

## 2019-09-04 PROCEDURE — 77063 BREAST TOMOSYNTHESIS BI: CPT | Performed by: STUDENT IN AN ORGANIZED HEALTH CARE EDUCATION/TRAINING PROGRAM

## 2019-09-09 ENCOUNTER — OFFICE VISIT (OUTPATIENT)
Dept: FAMILY MEDICINE | Facility: CLINIC | Age: 64
End: 2019-09-09
Payer: COMMERCIAL

## 2019-09-09 VITALS
DIASTOLIC BLOOD PRESSURE: 76 MMHG | BODY MASS INDEX: 21.58 KG/M2 | RESPIRATION RATE: 16 BRPM | HEIGHT: 64 IN | WEIGHT: 126.4 LBS | TEMPERATURE: 98.2 F | SYSTOLIC BLOOD PRESSURE: 116 MMHG | OXYGEN SATURATION: 100 % | HEART RATE: 71 BPM

## 2019-09-09 DIAGNOSIS — J30.1 CHRONIC SEASONAL ALLERGIC RHINITIS DUE TO POLLEN: ICD-10-CM

## 2019-09-09 DIAGNOSIS — Z91.018 ALLERGY, FOOD: ICD-10-CM

## 2019-09-09 DIAGNOSIS — Z00.00 ENCOUNTER FOR MEDICARE ANNUAL WELLNESS EXAM: Primary | ICD-10-CM

## 2019-09-09 DIAGNOSIS — H60.8X3 CHRONIC ECZEMATOUS OTITIS EXTERNA OF BOTH EARS: ICD-10-CM

## 2019-09-09 DIAGNOSIS — Z23 NEED FOR PROPHYLACTIC VACCINATION AGAINST STREPTOCOCCUS PNEUMONIAE (PNEUMOCOCCUS): ICD-10-CM

## 2019-09-09 DIAGNOSIS — Z83.49 FAMILY HISTORY OF THYROID DISEASE: ICD-10-CM

## 2019-09-09 DIAGNOSIS — Z28.21 VACCINATION NOT CARRIED OUT BECAUSE OF PATIENT REFUSAL: ICD-10-CM

## 2019-09-09 DIAGNOSIS — H61.23 EXCESSIVE CERUMEN IN BOTH EAR CANALS: ICD-10-CM

## 2019-09-09 DIAGNOSIS — Z13.220 LIPID SCREENING: ICD-10-CM

## 2019-09-09 DIAGNOSIS — Z13.1 SCREENING FOR DIABETES MELLITUS: ICD-10-CM

## 2019-09-09 LAB
CHOLEST SERPL-MCNC: 246 MG/DL
GLUCOSE SERPL-MCNC: 92 MG/DL (ref 70–99)
HDLC SERPL-MCNC: 97 MG/DL
LDLC SERPL CALC-MCNC: 137 MG/DL
NONHDLC SERPL-MCNC: 149 MG/DL
T4 FREE SERPL-MCNC: 0.74 NG/DL (ref 0.76–1.46)
TRIGL SERPL-MCNC: 62 MG/DL
TSH SERPL DL<=0.005 MIU/L-ACNC: 4.15 MU/L (ref 0.4–4)

## 2019-09-09 PROCEDURE — 80061 LIPID PANEL: CPT | Performed by: FAMILY MEDICINE

## 2019-09-09 PROCEDURE — 82947 ASSAY GLUCOSE BLOOD QUANT: CPT | Performed by: FAMILY MEDICINE

## 2019-09-09 PROCEDURE — 99397 PER PM REEVAL EST PAT 65+ YR: CPT | Mod: 25 | Performed by: FAMILY MEDICINE

## 2019-09-09 PROCEDURE — 90471 IMMUNIZATION ADMIN: CPT | Performed by: FAMILY MEDICINE

## 2019-09-09 PROCEDURE — 36415 COLL VENOUS BLD VENIPUNCTURE: CPT | Performed by: FAMILY MEDICINE

## 2019-09-09 PROCEDURE — 84443 ASSAY THYROID STIM HORMONE: CPT | Performed by: FAMILY MEDICINE

## 2019-09-09 PROCEDURE — 90670 PCV13 VACCINE IM: CPT | Performed by: FAMILY MEDICINE

## 2019-09-09 PROCEDURE — 84439 ASSAY OF FREE THYROXINE: CPT | Performed by: FAMILY MEDICINE

## 2019-09-09 PROCEDURE — 69209 REMOVE IMPACTED EAR WAX UNI: CPT | Mod: LT | Performed by: FAMILY MEDICINE

## 2019-09-09 RX ORDER — MOMETASONE FUROATE MONOHYDRATE 50 UG/1
2 SPRAY, METERED NASAL DAILY
Qty: 1 BOX | Refills: 3 | Status: SHIPPED | OUTPATIENT
Start: 2019-09-09 | End: 2020-05-29

## 2019-09-09 RX ORDER — PREDNISOLONE ACETATE 10 MG/ML
SUSPENSION/ DROPS OPHTHALMIC
Qty: 1 BOTTLE | Refills: 5 | Status: SHIPPED | OUTPATIENT
Start: 2019-09-09 | End: 2020-05-29

## 2019-09-09 RX ORDER — PREDNISOLONE ACETATE 10 MG/ML
1-2 SUSPENSION/ DROPS OPHTHALMIC 4 TIMES DAILY
COMMUNITY
End: 2020-05-20

## 2019-09-09 RX ORDER — EPINEPHRINE 0.3 MG/.3ML
0.3 INJECTION SUBCUTANEOUS PRN
Qty: 0.6 ML | Refills: 1 | Status: SHIPPED | OUTPATIENT
Start: 2019-09-09 | End: 2021-04-09

## 2019-09-09 ASSESSMENT — MIFFLIN-ST. JEOR: SCORE: 1103.35

## 2019-09-09 ASSESSMENT — PAIN SCALES - GENERAL: PAINLEVEL: NO PAIN (0)

## 2019-09-09 NOTE — LETTER
"September 10, 2019      Jhoana Hernandez  [address] [place] [address]        Ms. Hernandez,    Your TSH indicates hypothryoidism or \"low thyroid\".  This can cause a number of symptoms including weight gain, fatigue, high cholesterol, constipation, cold intollerance, etc.  To confirm the diagnosis, I would like you to have it rechecked in 1 month. You can make a lab appointment foro this, then follow up with me about 2 days later.    Please contact the clinic if you have additional questions.  Thank you.    Sincerely,      Nelson Camp MD/christina      Resulted Orders   Lipid panel reflex to direct LDL Non-fasting   Result Value Ref Range    Cholesterol 246 (H) <200 mg/dL      Comment:      Desirable:       <200 mg/dl    Triglycerides 62 <150 mg/dL      Comment:      Fasting specimen    HDL Cholesterol 97 >49 mg/dL    LDL Cholesterol Calculated 137 (H) <100 mg/dL      Comment:      Above desirable:  100-129 mg/dl  Borderline High:  130-159 mg/dL  High:             160-189 mg/dL  Very high:       >189 mg/dl      Non HDL Cholesterol 149 (H) <130 mg/dL      Comment:      Above Desirable:  130-159 mg/dl  Borderline high:  160-189 mg/dl  High:             190-219 mg/dl  Very high:       >219 mg/dl     Glucose   Result Value Ref Range    Glucose 92 70 - 99 mg/dL      Comment:      Fasting specimen   TSH with free T4 reflex   Result Value Ref Range    TSH 4.15 (H) 0.40 - 4.00 mU/L   T4 free   Result Value Ref Range    T4 Free 0.74 (L) 0.76 - 1.46 ng/dL     "

## 2019-09-09 NOTE — PROGRESS NOTES
"  SUBJECTIVE:   Jhoana Hernandez is a 64 year old female who presents for Preventive Visit.  Are you in the first 12 months of your Medicare Part B coverage?  Patient doesn't have Medicare insurance     Physical Health:    In general, how would you rate your overall physical health? good    Outside of work, how many days during the week do you exercise? 2-3 days/week    Outside of work, approximately how many minutes a day do you exercise?15-30 minutes    If you drink alcohol do you typically have >3 drinks per day or >7 drinks per week? No    Do you usually eat at least 4 servings of fruit and vegetables a day, include whole grains & fiber and avoid regularly eating high fat or \"junk\" foods? Yes    Do you have any problems taking medications regularly?  No    Do you have any side effects from medications? not applicable    Needs assistance for the following daily activities: no assistance needed    Which of the following safety concerns are present in your home?  none identified     Hearing impairment: No    In the past 6 months, have you been bothered by leaking of urine? no    Mental Health:    In general, how would you rate your overall mental or emotional health? good  PHQ-2 Score:      Do you feel safe in your environment? Yes    Do you have a Health Care Directive? Yes: Advance Directive has been received and scanned.    Additional concerns to address?  YES- blood work, ear wash, pcv    Fall risk:  Fallen 2 or more times in the past year?: No  Any fall with injury in the past year?: No    Cognitive Screenin) Repeat 3 items (Leader, Season, Table)    2) Clock draw: NORMAL  3) 3 item recall: Recalls 2 objects   Results: NORMAL clock, 1-2 items recalled: COGNITIVE IMPAIRMENT LESS LIKELY    Mini-CogTM Copyright JANINA Richey. Licensed by the author for use in [facility]; reprinted with permission (rasheed@.[email or web address]). All rights reserved.      Do you have sleep apnea, excessive snoring or daytime " drowsiness?: no        ENT SYMPTOMS      Duration: few weeks    Description  Ear discomfort and muffled hearing L>R    Severity: mild    Accompanying signs and symptoms: None    History (predisposing factors):  Recurrent problem, uses ear drops for eczema regularly as well    Precipitating or alleviating factors: None    Therapies tried and outcome:  Her ear drops        Social History     Tobacco Use     Smoking status: Never Smoker     Smokeless tobacco: Never Used   Substance Use Topics     Alcohol use: Yes     Comment: occassionally                           Current providers sharing in care for this patient include:   Patient Care Team:  Nelson Camp MD as PCP - General (Family Practice)  Nelson Camp MD as Assigned PCP    The following health maintenance items are reviewed in Epic and correct as of today:  Health Maintenance   Topic Date Due     ADVANCE CARE PLANNING  04/03/2017     PHQ-2  01/01/2019     FALL RISK ASSESSMENT  02/01/2019     PNEUMOCOCCAL IMMUNIZATION 65+ LOW/MEDIUM RISK (1 of 2 - PCV13) 02/01/2019     MEDICARE ANNUAL WELLNESS VISIT  05/25/2019     INFLUENZA VACCINE (1) 09/01/2019     MAMMO SCREENING  09/04/2020     COLONOSCOPY  07/26/2022     LIPID  05/25/2023     DTAP/TDAP/TD IMMUNIZATION (3 - Td) 05/25/2028     DEXA  Completed     HEPATITIS C SCREENING  Completed     HIV SCREENING  Completed     ZOSTER IMMUNIZATION  Completed     IPV IMMUNIZATION  Aged Out     MENINGITIS IMMUNIZATION  Aged Out     Past medical, family, and social histories, medications, and allergies are reviewed and updated in Epic.     ROS:  Constitutional, HEENT, cardiovascular, pulmonary, gi and gu systems are negative, except as otherwise noted.  CONSTITUTIONAL: NEGATIVE for fever, chills, change in weight  INTEGUMENTARY/SKIN: NEGATIVE for worrisome rashes, moles or lesions  EYES: NEGATIVE for vision changes or irritation  ENT/MOUTH: see above, otherwise negative  RESP: NEGATIVE for significant cough or  "SOB  BREAST: NEGATIVE for masses, tenderness or discharge  CV: NEGATIVE for chest pain, palpitations or peripheral edema  GI: NEGATIVE for nausea, abdominal pain, heartburn, or change in bowel habits  : NEGATIVE for frequency, dysuria, or hematuria  MUSCULOSKELETAL: NEGATIVE for significant arthralgias or myalgia  NEURO: NEGATIVE for weakness, dizziness or paresthesias  ENDOCRINE: NEGATIVE for temperature intolerance, skin/hair changes  HEME: NEGATIVE for bleeding problems  PSYCHIATRIC: NEGATIVE for changes in mood or affect    OBJECTIVE:   LMP  (LMP Unknown)  Estimated body mass index is 22.04 kg/m  as calculated from the following:    Height as of 4/26/19: 1.626 m (5' 4\").    Weight as of 4/26/19: 58.2 kg (128 lb 6.4 oz).  EXAM:   GENERAL APPEARANCE: healthy, alert and no distress  EYES: Eyes grossly normal to inspection, PERRL and conjunctivae and sclerae normal  HENT: ear canals with cerumen, completely occlusive on left, and TM's normal once they are visible, nose and mouth without ulcers or lesions, oropharynx clear and oral mucous membranes moist  NECK: no adenopathy, no asymmetry, masses, or scars and thyroid normal to palpation  RESP: lungs clear to auscultation - no rales, rhonchi or wheezes  BREAST: normal without masses, tenderness or nipple discharge and no palpable axillary masses or adenopathy  CV: regular rate and rhythm, normal S1 S2, no S3 or S4, no murmur, click or rub, no peripheral edema and peripheral pulses strong  ABDOMEN: soft, nontender, no hepatosplenomegaly, no masses and bowel sounds normal  MS: no musculoskeletal defects are noted and gait is age appropriate without ataxia  SKIN: no suspicious lesions or rashes  NEURO: Normal strength and tone, sensory exam grossly normal, mentation intact and speech normal  PSYCH: mentation appears normal and affect normal/bright      ASSESSMENT / PLAN:   (Z00.00) Encounter for Medicare annual wellness exam  (primary encounter diagnosis)  Comment: " Negative screening exam; up-to-date on preventive services except flu shot  Plan: Lipid panel reflex to direct LDL Non-fasting,         Glucose, TSH with free T4 reflex, PNEUMOCOCCAL         CONJ VACCINE 13 VALENT IM        F/u 1 year    (H60.8X3) Chronic eczematous otitis externa of both ears  Comment: refill request  Plan: prednisoLONE acetate (PRED FORTE) 1 %         ophthalmic suspension          (J30.1) Chronic seasonal allergic rhinitis due to pollen  Comment: refill request  Plan: mometasone (NASONEX) 50 MCG/ACT nasal spray          (Z91.018) Allergy, food  Comment: refill request  Plan: EPINEPHrine (EPIPEN/ADRENACLICK/OR ANY BX         GENERIC EQUIV) 0.3 MG/0.3ML injection 2-pack          (H61.23) Excessive cerumen in both ear canals  Comment: resolved following irrigation by my MA  Plan: HC REMOVAL IMPACTED CERUMEN IRRIGATION/LVG         UNILAT        F/u prn    (Z13.220) Lipid screening  Comment: fasting  Plan: Lipid panel reflex to direct LDL Non-fasting          (Z13.1) Screening for diabetes mellitus  Comment:   Plan: Glucose          (Z83.49) Family history of thyroid disease  Comment: multiple siblings  Plan: TSH with free T4 reflex        Periodic monitoring    (Z23) Need for prophylactic vaccination against Streptococcus pneumoniae (pneumococcus)  Comment:   Plan:      ADMIN VACCINE, FIRST [87210], PNEUMOCOCCAL        CONJ VACCINE 13 VALENT IM          (Z28.21) Vaccination not carried out because of patient refusal  Comment: Influenza vaccine offered but declined by the patient. SHe has never had it, and her brother (RN) has had some allergy concerns about it in the past.  Plan: offer on a regular basis    End of Life Planning:  Patient currently has an advanced directive: Yes.  Practitioner is supportive of decision.    COUNSELING:  Reviewed preventive health counseling, as reflected in patient instructions  Special attention given to:       Immunizations    Vaccinated for: Pneumococcal and  "Declined: Influenza  due to Concerns about side effects/safety      Estimated body mass index is 22.04 kg/m  as calculated from the following:    Height as of 4/26/19: 1.626 m (5' 4\").    Weight as of 4/26/19: 58.2 kg (128 lb 6.4 oz).         reports that she has never smoked. She has never used smokeless tobacco.      Appropriate preventive services were discussed with this patient, including applicable screening as appropriate for cardiovascular disease, diabetes, osteopenia/osteoporosis, and glaucoma.  As appropriate for age/gender, discussed screening for colorectal cancer, prostate cancer, breast cancer, and cervical cancer. Checklist reviewing preventive services available has been given to the patient.    Reviewed patients plan of care and provided an AVS. The Basic Care Plan (routine screening as documented in Health Maintenance) for Jhoana meets the Care Plan requirement. This Care Plan has been established and reviewed with the Patient.    Counseling Resources:  ATP IV Guidelines  Pooled Cohorts Equation Calculator  Breast Cancer Risk Calculator  FRAX Risk Assessment  ICSI Preventive Guidelines  Dietary Guidelines for Americans, 2010  USDA's MyPlate  ASA Prophylaxis  Lung CA Screening    Nelson Camp MD  [facility]  "

## 2019-09-09 NOTE — NURSING NOTE
Both ear lavage completed in clinic.  Results are successful.    NATASHA Roy MA      Prior to immunization administration, verified patients identity using patient s name and date of birth. Please see Immunization Activity for additional information.     Screening Questionnaire for Adult Immunization    Are you sick today?   No   Do you have allergies to medications, food, a vaccine component or latex?   Yes   Have you ever had a serious reaction after receiving a vaccination?   Yes   Do you have a long-term health problem with heart disease, lung disease, asthma, kidney disease, metabolic disease (e.g. diabetes), anemia, or other blood disorder?   No   Do you have cancer, leukemia, HIV/AIDS, or any other immune system problem?   No   In the past 3 months, have you taken medications that affect  your immune system, such as prednisone, other steroids, or anticancer drugs; drugs for the treatment of rheumatoid arthritis, Crohn s disease, or psoriasis; or have you had radiation treatments?   No   Have you had a seizure, or a brain or other nervous system problem?   No   During the past year, have you received a transfusion of blood or blood     products, or been given immune (gamma) globulin or antiviral drug?   No   For women: Are you pregnant or is there a chance you could become        pregnant during the next month?   No   Have you received any vaccinations in the past 4 weeks?   No     Immunization questionnaire was positive for at least one answer.  Notified Dr. Camp        Patient instructed to remain in clinic for 15 minutes afterwards, and to report any adverse reaction to me immediately.       Screening performed by Aster Roy MA on 9/9/2019 at 8:01 AM.

## 2019-09-09 NOTE — PATIENT INSTRUCTIONS
Patient Education   Personalized Prevention Plan  You are due for the preventive services outlined below.  Your care team is available to assist you in scheduling these services.  If you have already completed any of these items, please share that information with your care team to update in your medical record.  Health Maintenance Due   Topic Date Due     Discuss Advance Care Planning  04/03/2017     PHQ-2  01/01/2019     FALL RISK ASSESSMENT  02/01/2019     Pneumococcal Vaccine (1 of 2 - PCV13) 02/01/2019     Annual Wellness Visit  05/25/2019     Flu Vaccine (1) 09/01/2019        Patient Education   Personalized Prevention Plan  You are due for the preventive services outlined below.  Your care team is available to assist you in scheduling these services.  If you have already completed any of these items, please share that information with your care team to update in your medical record.  Health Maintenance Due   Topic Date Due     Discuss Advance Care Planning  04/03/2017     PHQ-2  01/01/2019     FALL RISK ASSESSMENT  02/01/2019     Pneumococcal Vaccine (1 of 2 - PCV13) 02/01/2019     Annual Wellness Visit  05/25/2019     Flu Vaccine (1) 09/01/2019

## 2019-09-10 DIAGNOSIS — R79.89 ELEVATED TSH: Primary | ICD-10-CM

## 2019-12-26 ENCOUNTER — OFFICE VISIT (OUTPATIENT)
Dept: URGENT CARE | Facility: URGENT CARE | Age: 64
End: 2019-12-26
Payer: MEDICARE

## 2019-12-26 VITALS
SYSTOLIC BLOOD PRESSURE: 158 MMHG | BODY MASS INDEX: 22.14 KG/M2 | OXYGEN SATURATION: 100 % | TEMPERATURE: 98.2 F | WEIGHT: 129 LBS | HEART RATE: 67 BPM | DIASTOLIC BLOOD PRESSURE: 89 MMHG

## 2019-12-26 DIAGNOSIS — H61.23 BILATERAL IMPACTED CERUMEN: Primary | ICD-10-CM

## 2019-12-26 PROCEDURE — 69209 REMOVE IMPACTED EAR WAX UNI: CPT | Mod: 50 | Performed by: PHYSICIAN ASSISTANT

## 2019-12-26 PROCEDURE — 99213 OFFICE O/P EST LOW 20 MIN: CPT | Mod: 25 | Performed by: PHYSICIAN ASSISTANT

## 2019-12-26 NOTE — PATIENT INSTRUCTIONS
Patient Education     Impacted Earwax     Inner ear structures including ear canal and eardrum.     Impacted earwax is a buildup of the natural wax in the ear (cerumen). Impacted earwax is very common. It can cause symptoms such as hearing loss. It can also make it difficult for a doctor to examine your ear.  Understanding earwax  Tiny glands in your ear make substances that combine with dead skin cells to form earwax. Earwax helps protect your ear canal from water, dirt, infection, and injury. Over time, earwax travels from the inner part of your ear canal to the entrance of the canal. Then it falls away naturally. But in some cases, it can t travel to the entrance of the canal. This may be because of a health condition or objects put in the ear. With age, earwax tends to become harder and less fluid. Older adults are more likely to have problems with earwax buildup.  What causes impacted earwax?  Earwax can build up because of many health conditions. Some cause a physical blockage. Others cause too much earwax to be made. Health conditions that can cause earwax buildup include:    Use of cotton swabs to clean deep in the ear canal    Bony blockage in the ear (osteoma or exostoses)    Infections, such as  infection of the outer ear (external otitis)    Skin disease, such as eczema    Autoimmune diseases, such as lupus    A narrowed ear canal from birth, chronic inflammation, or injury    Too much earwax because of injury    Too much earwax because of  water in the ear canal  Objects repeatedly placed in the ear can also cause impacted earwax. For example, putting cotton swabs in the ear may push the wax deeper into the ear. Over time, this may cause blockage. Hearing aids, swimming plugs, and swim molds can cause the same problem when used again and again.  In some cases, the cause of impacted earwax is not known.  Symptoms of impacted earwax  Excess earwax usually does not cause any symptoms, unless there is a  large amount of buildup. Then it may cause symptoms such as:    Hearing loss    Earache    Sense of ear fullness    Itching in the ear    Odor from the ear    Ear drainage    Dizziness    Ringing in the ears    Cough  Treatment for impacted earwax  If you don t have symptoms, you may not need treatment. Often, the earwax goes away on its own with time. If you have symptoms, you may have one or more treatments such as:    Eardrops to soften the earwax. This helps it leave the ear over time.    Rinsing (irrigation) of the ear canal with water. This is done in a doctor s office.    Removal of the earwax with small tools. This is also done in a doctor s office.  In rare cases, some treatments for earwax removal may cause complications such as:    Infection of the outer ear (otitis external)    Earache    Short-term hearing loss    Dizziness    Water trapped in the ear canal    Hole in the eardrum    Ringing in the ears    Bleeding from the ear  Talk with your healthcare provider about which risks apply most to you.  Don t use these at home  Healthcare providers do not advise use of ear candles or ear vacuum kits. These methods are not shown to work and may cause problems.   Preventing impacted earwax  You may not be able to prevent impacted earwax if you have a health condition that causes it, such as eczema. In other cases, you may be able to prevent earwax buildup by:    Using ear drops once a week    Having routine cleaning of the ear about every 6 months    Not using cotton swabs in the ear  When to call the healthcare provider  Call your healthcare provider if you have symptoms of impacted earwax. Also call right away if you have severe symptoms after earwax removal. These may include bleeding or severe ear pain.   Date Last Reviewed: 5/1/2017 2000-2018 [employer]. [address] [place] [address], [place] [address]. All rights reserved. This information is not intended as a substitute for professional  medical care. Always follow your healthcare professional's instructions.

## 2019-12-26 NOTE — PROGRESS NOTES
SUBJECTIVE:   Jhoana Hernandez is a 64 year old female presenting with a chief complaint of   Chief Complaint   Patient presents with     Otalgia     Patient complains of pain in left ear        She is an established patient of [place].  Patent here with complaints of left ear pain for several days.  States was here on jv phylic[facility] and had to leave due to wait.  She denies any fevers or other URI like symptoms.  She has been putting prednisolone optic drops in ears.  Patient requesting ear flushing.      URI Adult    Onset of symptoms was 4 day(s) ago.  Course of illness is same.    Severity moderate  Current and Associated symptoms: none  Treatment measures tried include steroid eye drops in ears.  Predisposing factors include None.        Review of Systems   HENT: Positive for ear pain.    All other systems reviewed and are negative.      Past Medical History:   Diagnosis Date     Gastritis      IBS (irritable bowel syndrome)      Infertility, female 1989    had IVF     Lactose intolerance      Osteopenia 11/4/2011     Ovarian cyst 2011    bilateral serous cystadenomas     Recurrent UTI      Seasonal allergic rhinitis      Family History   Problem Relation Age of Onset     Asthma Sister      Diabetes Sister         Type II (questionable)     Breast Cancer Sister 60     Lipids Mother      Cerebrovascular Disease Father      Thyroid Disease Other      C.A.D. No family hx of      Hypertension No family hx of      Cancer - colorectal No family hx of      Prostate Cancer No family hx of      Current Outpatient Medications   Medication Sig Dispense Refill     BENADRYL ALLERGY OR AS NEEDED FOR ALLERGIES       carbamide peroxide (DEBROX) 6.5 % otic solution Place 5 drops into both ears 2 times daily for 5 days 2.5 mL 0     CLARITIN OR AS NEEDED FOR ALLERGIES       EPINEPHrine (EPIPEN/ADRENACLICK/OR ANY BX GENERIC EQUIV) 0.3 MG/0.3ML injection 2-pack Inject 0.3 mLs (0.3 mg) into the muscle as needed for anaphylaxis  (or exposure to trigger) 0.6 mL 1     hydrocortisone 2.5 % cream Apply sparingly to affected area 2 times daily for no more than 14 days 30 g 0     mometasone (NASONEX) 50 MCG/ACT nasal spray Spray 2 sprays into both nostrils daily for allergy prevention. 1 Box 3     prednisoLONE acetate (PRED FORTE) 1 % ophthalmic suspension 1-2 drops 4 times daily       prednisoLONE acetate (PRED FORTE) 1 % ophthalmic suspension 4 drops into both ears twice weekly 1 Bottle 5     VENTOLIN  (90 Base) MCG/ACT inhaler   0     conjugated estrogens (PREMARIN) vaginal cream Place 1 g vaginally twice a week (Patient not taking: Reported on 9/9/2019) 30 g 3     nabumetone (RELAFEN) 500 MG tablet Take 2 tablets (1,000 mg) by mouth daily with food for hip pain. (Patient not taking: Reported on 9/9/2019) 60 tablet 1     Social History     Tobacco Use     Smoking status: Never Smoker     Smokeless tobacco: Never Used   Substance Use Topics     Alcohol use: Yes     Comment: occassionally       OBJECTIVE  BP (!) 158/89 (BP Location: Left arm, Patient Position: Chair, Cuff Size: Adult Regular)   Pulse 67   Temp 98.2  F (36.8  C) (Oral)   Wt 58.5 kg (129 lb)   LMP  (Exact Date)   SpO2 100%   BMI 22.14 kg/m      Physical Exam  Vitals signs and nursing note reviewed.   Constitutional:       Appearance: Normal appearance.   HENT:      Head: Normocephalic and atraumatic.      Right Ear: Tympanic membrane normal.      Left Ear: Tympanic membrane normal.        Comments: Bilateral ear cerumenEyes:      Conjunctiva/sclera: Conjunctivae normal.   Neck:      Musculoskeletal: Normal range of motion and neck supple.   Cardiovascular:      Rate and Rhythm: Normal rate and regular rhythm.      Pulses: Normal pulses.      Heart sounds: Normal heart sounds.   Pulmonary:      Effort: Pulmonary effort is normal.      Breath sounds: Normal breath sounds.   Musculoskeletal: Normal range of motion.   Skin:     General: Skin is warm and dry.       Capillary Refill: Capillary refill takes less than 2 seconds.   Neurological:      General: No focal deficit present.      Mental Status: She is alert and oriented to person, place, and time.   Psychiatric:         Behavior: Behavior normal.         Labs:  No results found for this or any previous visit (from the past 24 hour(s)).    X-Ray was not done.    ASSESSMENT:      ICD-10-CM    1. Bilateral impacted cerumen H61.23 carbamide peroxide (DEBROX) 6.5 % otic solution        Medical Decision Making:    Differential Diagnosis:  URI Adult/Peds:  Cerumen impaction.    Serious Comorbid Conditions:  Adult:  None    PLAN:  Debrox.  Patient's ear flushed with little return.  Bilateral TM's normal.      Followup:    If not improving or if condition worsens, follow up with your Primary Care Provider    Patient Instructions       Patient Education     Impacted Earwax     Inner ear structures including ear canal and eardrum.     Impacted earwax is a buildup of the natural wax in the ear (cerumen). Impacted earwax is very common. It can cause symptoms such as hearing loss. It can also make it difficult for a doctor to examine your ear.  Understanding earwax  Tiny glands in your ear make substances that combine with dead skin cells to form earwax. Earwax helps protect your ear canal from water, dirt, infection, and injury. Over time, earwax travels from the inner part of your ear canal to the entrance of the canal. Then it falls away naturally. But in some cases, it can t travel to the entrance of the canal. This may be because of a health condition or objects put in the ear. With age, earwax tends to become harder and less fluid. Older adults are more likely to have problems with earwax buildup.  What causes impacted earwax?  Earwax can build up because of many health conditions. Some cause a physical blockage. Others cause too much earwax to be made. Health conditions that can cause earwax buildup include:    Use of cotton  swabs to clean deep in the ear canal    Bony blockage in the ear (osteoma or exostoses)    Infections, such as  infection of the outer ear (external otitis)    Skin disease, such as eczema    Autoimmune diseases, such as lupus    A narrowed ear canal from birth, chronic inflammation, or injury    Too much earwax because of injury    Too much earwax because of  water in the ear canal  Objects repeatedly placed in the ear can also cause impacted earwax. For example, putting cotton swabs in the ear may push the wax deeper into the ear. Over time, this may cause blockage. Hearing aids, swimming plugs, and swim molds can cause the same problem when used again and again.  In some cases, the cause of impacted earwax is not known.  Symptoms of impacted earwax  Excess earwax usually does not cause any symptoms, unless there is a large amount of buildup. Then it may cause symptoms such as:    Hearing loss    Earache    Sense of ear fullness    Itching in the ear    Odor from the ear    Ear drainage    Dizziness    Ringing in the ears    Cough  Treatment for impacted earwax  If you don t have symptoms, you may not need treatment. Often, the earwax goes away on its own with time. If you have symptoms, you may have one or more treatments such as:    Eardrops to soften the earwax. This helps it leave the ear over time.    Rinsing (irrigation) of the ear canal with water. This is done in a doctor s office.    Removal of the earwax with small tools. This is also done in a doctor s office.  In rare cases, some treatments for earwax removal may cause complications such as:    Infection of the outer ear (otitis external)    Earache    Short-term hearing loss    Dizziness    Water trapped in the ear canal    Hole in the eardrum    Ringing in the ears    Bleeding from the ear  Talk with your healthcare provider about which risks apply most to you.  Don t use these at home  Healthcare providers do not advise use of ear candles or ear  vacuum kits. These methods are not shown to work and may cause problems.   Preventing impacted earwax  You may not be able to prevent impacted earwax if you have a health condition that causes it, such as eczema. In other cases, you may be able to prevent earwax buildup by:    Using ear drops once a week    Having routine cleaning of the ear about every 6 months    Not using cotton swabs in the ear  When to call the healthcare provider  Call your healthcare provider if you have symptoms of impacted earwax. Also call right away if you have severe symptoms after earwax removal. These may include bleeding or severe ear pain.   Date Last Reviewed: 5/1/2017 2000-2018 [employer]. [address] [place] [address], [place] [address]. All rights reserved. This information is not intended as a substitute for professional medical care. Always follow your healthcare professional's instructions.

## 2020-05-20 ENCOUNTER — VIRTUAL VISIT (OUTPATIENT)
Dept: FAMILY MEDICINE | Facility: CLINIC | Age: 65
End: 2020-05-20
Payer: MEDICARE

## 2020-05-20 ENCOUNTER — TELEPHONE (OUTPATIENT)
Dept: FAMILY MEDICINE | Facility: CLINIC | Age: 65
End: 2020-05-20

## 2020-05-20 VITALS — TEMPERATURE: 97 F

## 2020-05-20 DIAGNOSIS — J02.9 SORE THROAT: ICD-10-CM

## 2020-05-20 DIAGNOSIS — H61.22 IMPACTED CERUMEN OF LEFT EAR: Primary | ICD-10-CM

## 2020-05-20 DIAGNOSIS — H92.02 OTALGIA, LEFT: ICD-10-CM

## 2020-05-20 PROCEDURE — 99442: CPT | Performed by: FAMILY MEDICINE

## 2020-05-20 RX ORDER — AMOXICILLIN 500 MG/1
1000 CAPSULE ORAL 2 TIMES DAILY
Qty: 40 CAPSULE | Refills: 0 | Status: SHIPPED | OUTPATIENT
Start: 2020-05-20 | End: 2020-05-30

## 2020-05-20 ASSESSMENT — PAIN SCALES - GENERAL: PAINLEVEL: SEVERE PAIN (7)

## 2020-05-20 NOTE — PROGRESS NOTES
"Jhoana Hernandez is a 65 year old female who is being evaluated via a billable telephone visit.      The patient has been notified of following:     \"This telephone visit will be conducted via a call between you and your physician/provider. We have found that certain health care needs can be provided without the need for a physical exam.  This service lets us provide the care you need with a short phone conversation.  If a prescription is necessary we can send it directly to your pharmacy.  If lab work is needed we can place an order for that and you can then stop by our lab to have the test done at a later time.    Telephone visits are billed at different rates depending on your insurance coverage. During this emergency period, for some insurers they may be billed the same as an in-person visit.  Please reach out to your insurance provider with any questions.    If during the course of the call the physician/provider feels a telephone visit is not appropriate, you will not be charged for this service.\"    Patient has given verbal consent for Telephone visit?  Yes    What phone number would you like to be contacted at? [phone]    How would you like to obtain your AVS? Mail a copy    Subjective     Jhoana Hernandez is a 65 year old female who presents via phone visit today for the following health issues:    HPI  Acute Illness   Acute illness concerns: ear  Onset: 1 month    Fever: no    Chills/Sweats: YES- chills, minor. Maybe from drinking cold water to help throat feel better.     Headache (location?): YES (not much), could be related to her allergies.     Sinus Pressure:no    Conjunctivitis:  no    Ear Pain: YES: left    Rhinorrhea: YES    Congestion: YES    Sore Throat: YES- left side     Cough: no    Wheeze: no    Decreased Appetite: YES    Nausea: no    Vomiting: no    Diarrhea:  no    Dysuria/Freq.: no    Fatigue/Achiness: YES    Sick/Strep Exposure: no     Therapies Tried and outcome: " "claritin, debrox      Trouble with ear wax in the past. Gets intermittent obstruction and washes. Has seen Dr. Pepper in the past- told her ear wax is \"sticky\" and small ear canal. Also given prednisalone ophthalmic suspension to use as needed cerumen obstruction but has tried this in the past without success.   One month ago started having trouble hearing  and used debrox. Felt it helped hearing but continued to have the feeling that her ear canal was obstructed    2 weeks ago ear started to hurt. One week ago throat on that side started to hurt. Hurts to swallow. Pain radiates into neck   Just today fatigue/decreased appetite and feeling little more off   No pain with movement of outside of ear.     Allergies to trees and using claritin as needed. Some post-nasal drip also present.     Hx of sinus issues in the past but current symptoms do not really feel like a sinus infection      Patient Active Problem List   Diagnosis     CARDIOVASCULAR SCREENING; LDL GOAL LESS THAN 160     Lactose intolerance     Seasonal allergic rhinitis     Allergy, food     Advanced directives, counseling/discussion     Subclinical hypothyroidism     Osteopenia     Hollenhorst plaque, left eye     Family history of breast cancer     Chronic eczematous otitis externa of both ears     Past Surgical History:   Procedure Laterality Date     C TOTAL ABDOM HYSTERECTOMY      with BSO      SECTION  ,      COLONOSCOPY  2012    Procedure: COLONOSCOPY;  COLONOSCOPY, ABDOMINAL PAIN;  Surgeon: Maco Mascorro MD;  Location: [facility] OR     HC EXCISION BREAST LESION, OPEN >=1      LT Benign     HYSTERECTOMY, PAP NO LONGER INDICATED      JOSAFAT with BSO- benign bilateral serous cystadenomas     LAPAROSCOPY      ov cystectomy       Social History     Tobacco Use     Smoking status: Never Smoker     Smokeless tobacco: Never Used   Substance Use Topics     Alcohol use: Yes     Comment: occassionally     Family History "   Problem Relation Age of Onset     Asthma Sister      Diabetes Sister         Type II (questionable)     Breast Cancer Sister 60     Lipids Mother      Cerebrovascular Disease Father      Thyroid Disease Other      C.A.D. No family hx of      Hypertension No family hx of      Cancer - colorectal No family hx of      Prostate Cancer No family hx of          Current Outpatient Medications   Medication Sig Dispense Refill     amoxicillin (AMOXIL) 500 MG capsule Take 2 capsules (1,000 mg) by mouth 2 times daily for 10 days 40 capsule 0     BENADRYL ALLERGY OR AS NEEDED FOR ALLERGIES       CLARITIN OR AS NEEDED FOR ALLERGIES       conjugated estrogens (PREMARIN) vaginal cream Place 1 g vaginally twice a week 30 g 3     EPINEPHrine (EPIPEN/ADRENACLICK/OR ANY BX GENERIC EQUIV) 0.3 MG/0.3ML injection 2-pack Inject 0.3 mLs (0.3 mg) into the muscle as needed for anaphylaxis (or exposure to trigger) 0.6 mL 1     hydrocortisone 2.5 % cream Apply sparingly to affected area 2 times daily for no more than 14 days 30 g 0     mometasone (NASONEX) 50 MCG/ACT nasal spray Spray 2 sprays into both nostrils daily for allergy prevention. 1 Box 3     prednisoLONE acetate (PRED FORTE) 1 % ophthalmic suspension 4 drops into both ears twice weekly 1 Bottle 5     VENTOLIN  (90 Base) MCG/ACT inhaler   0     Allergies   Allergen Reactions     Aloe Vera      Fish      Fluticasone      Ibuprofen Sodium      Macrodantin [Nitrofuran Derivatives]      Mineral Oil      Nuts      Seafood      Shellfish Allergy      Sulfa Drugs      Yellow Dye        Reviewed and updated as needed this visit by Provider  Tobacco  Allergies  Meds  Problems  Med Hx  Surg Hx  Fam Hx         Review of Systems   Constitutional, HEENT, cardiovascular, pulmonary, gi and gu systems are negative, except as otherwise noted.       Objective   Reported vitals:  Temp 97  F (36.1  C) (Axillary)   LMP  (Exact Date)   Breastfeeding No    healthy, alert and no  distress  PSYCH: Alert and oriented times 3; coherent speech, normal   rate and volume, able to articulate logical thoughts, able   to abstract reason, no tangential thoughts, no hallucinations   or delusions  Her affect is normal  RESP: No cough, no audible wheezing, able to talk in full sentences  Remainder of exam unable to be completed due to telephone visits    Diagnostic Test Results:  none         Assessment/Plan:  1. Impacted cerumen of left ear  2. Otalgia, left  3. Sore throat  It is unclear if her symptoms are primarily due to cerumen obstruction versus more infectious type process.  Given the progression of symptoms and now more systemic symptoms occurring will cover for otitis media and pharyngitis with amoxicillin.  Encouraged her to follow-up with her ENT provider if she continues to feel like the ear is obstructed and/or painful.  Be seen in our office next week if not able to get into ENT for recheck if persistent symptoms.  - amoxicillin (AMOXIL) 500 MG capsule; Take 2 capsules (1,000 mg) by mouth 2 times daily for 10 days  Dispense: 40 capsule; Refill: 0        Return in about 1 week (around 5/27/2020), or if symptoms worsen or fail to improve.   Patient requests non-colored pills due to her allergy- [facility]  F/u with Dr. Pepper if able to get in for ear suction or pcp office in next week.      Phone call duration:  13 minutes    Nakia Condon MD

## 2020-05-20 NOTE — TELEPHONE ENCOUNTER
Reason for call:  Patient reporting a symptom    Symptom or request: Symptoms     Duration (how long have symptoms been present): on going    Have you been treated for this before? No    Additional comments: Pt has been experiencing a sore ear along with a sore throat for the last two weeks and would like to know if she has an ear infection or not.     Phone Number patient can be reached at:  Home number on file [phone] (home)    Best Time:  anytime    Can we leave a detailed message on this number:  YES    Call taken on 5/20/2020 at 8:46 AM by Ronen Calvo

## 2020-05-20 NOTE — TELEPHONE ENCOUNTER
S-(situation): spoke with patient on the phone she is calling in reporting sore throat and sore left ear.     B-(background): hx of allergies to trees, hx of ear wax build up    A-(assessment): usually has this problem each time this time of year, has used OTC in left ear and feels a little better then was able to hear again, couple of weeks ago ear started to hurt and now throat hurting throat and ear left side pain radiate into neck, sinuses congested, taking Claritin intermittently not helping much, she feels like she is getting ear infection, chills, swallowing hurts on left side of throat,     Negative for following symptoms: fever, ear drainage, swelling on face neck    R-(recommendations): phone apt today. Patient is agreeable and writer assisted her in scheduling phone apt.    Khalida Croft RN

## 2020-05-21 NOTE — PATIENT INSTRUCTIONS
Start amoxicillin twice daily for 10 days for possible ear infection.    Follow-up with Dr. Pepper, ENT next 1 to 2 weeks if persistent ear discomfort, fullness or decreased hearing.    Follow-up with our clinic if you are unable to get into your ENT.

## 2020-05-22 ENCOUNTER — TELEPHONE (OUTPATIENT)
Dept: FAMILY MEDICINE | Facility: CLINIC | Age: 65
End: 2020-05-22

## 2020-05-22 DIAGNOSIS — H92.02 OTALGIA, LEFT: Primary | ICD-10-CM

## 2020-05-22 DIAGNOSIS — J02.9 SORE THROAT: ICD-10-CM

## 2020-05-22 RX ORDER — AMOXICILLIN 500 MG/1
1000 TABLET, FILM COATED ORAL 2 TIMES DAILY
Qty: 40 TABLET | Refills: 0 | Status: SHIPPED | OUTPATIENT
Start: 2020-05-22 | End: 2020-06-01

## 2020-05-22 NOTE — TELEPHONE ENCOUNTER
Reason for Call:  Medication or medication refill:    Do you use a [facility]?  Name of the pharmacy and phone number for the current request:  [id number] - [place], [place] - [address]     Name of the medication requested: amoxicillin (AMOXIL) 500 MG    Other request: Haris at [facility] pharmacy called with Concerns about the prescription that was sent over please call back at [phone] to advise. Thank you!     Can we leave a detailed message on this number? YES    Phone number patient can be reached at: haris @ [phone]    Best Time: Any    Call taken on 5/22/2020 at 10:12 AM by Juanita Field

## 2020-05-22 NOTE — TELEPHONE ENCOUNTER
..Reason for Call:   medication question/concern    Detailed comments: the color of the amoxl is yellow and Patient is allergic to the yellow dye; Suggesting, 875 milligram tabs in pink color of the same medication;   Took a 3-4 doses of the amoxicillin and feels it is working but may be reacting to it/congestion wise/please call to further discuss;    Would like to resolve today with the long weekend upon us    Phone Number Patient can be reached at: phone number:  [phone]    Best Time: anytime    Can we leave a detailed message on this number? n/a    Call taken on 5/22/2020 at 2:50 PM by Claribel Vidal

## 2020-05-22 NOTE — TELEPHONE ENCOUNTER
Called pharmacy and patient has an allergy to the yellow dye which is in the amoxicillin capsules. They are requesting either a suspension be sent or tablets for the amoxicillin. Please advise.       Reymundo Freire RN, BSN, PHN

## 2020-05-29 ENCOUNTER — OFFICE VISIT (OUTPATIENT)
Dept: OTOLARYNGOLOGY | Facility: CLINIC | Age: 65
End: 2020-05-29
Payer: MEDICARE

## 2020-05-29 VITALS
DIASTOLIC BLOOD PRESSURE: 78 MMHG | HEART RATE: 96 BPM | SYSTOLIC BLOOD PRESSURE: 128 MMHG | WEIGHT: 131 LBS | BODY MASS INDEX: 22.49 KG/M2

## 2020-05-29 DIAGNOSIS — H61.23 BILATERAL IMPACTED CERUMEN: ICD-10-CM

## 2020-05-29 DIAGNOSIS — H60.8X3 CHRONIC ECZEMATOUS OTITIS EXTERNA OF BOTH EARS: Primary | ICD-10-CM

## 2020-05-29 DIAGNOSIS — J30.1 CHRONIC SEASONAL ALLERGIC RHINITIS DUE TO POLLEN: ICD-10-CM

## 2020-05-29 PROCEDURE — 69210 REMOVE IMPACTED EAR WAX UNI: CPT | Performed by: OTOLARYNGOLOGY

## 2020-05-29 PROCEDURE — 99213 OFFICE O/P EST LOW 20 MIN: CPT | Mod: 25 | Performed by: OTOLARYNGOLOGY

## 2020-05-29 RX ORDER — MOMETASONE FUROATE MONOHYDRATE 50 UG/1
2 SPRAY, METERED NASAL DAILY
Qty: 1 BOX | Refills: 11 | Status: SHIPPED | OUTPATIENT
Start: 2020-05-29 | End: 2021-06-14

## 2020-05-29 RX ORDER — PREDNISOLONE ACETATE 10 MG/ML
SUSPENSION/ DROPS OPHTHALMIC
Qty: 1 BOTTLE | Refills: 5 | Status: SHIPPED | OUTPATIENT
Start: 2020-05-29 | End: 2021-05-11

## 2020-05-29 ASSESSMENT — PAIN SCALES - GENERAL: PAINLEVEL: MILD PAIN (3)

## 2020-05-29 NOTE — LETTER
5/29/2020         RE: Jhoana Hernandez  [address] [place] [address]        Dear Colleague,    Thank you for referring your patient, Jhoana Hernandez, to the [facility]. Please see a copy of my visit note below.    History of Present Illness - Jhoana Hernandez is a 65 year old female last seen on 2/6/2018.     To review, for many years she has noted a sense of fullness and itching in the ears. She has tried various OTC drops before, but nothing seems to help.  She has never had ear surgery or chronic ear disease.  However, she notes that there seems to be a routine event every fall and spring where her ears seem more stuffy and full.  On exam, after debriding out the canals, her exam strongly suggested chronic eczematous otitis externa.  I would have liked to have used dermotic, but she has a severe anaphylactic nut allergy and therefore I had her use more home treatments like olive oil.  She tells me that it has seemed to help.  Her ears will get stuffy at times.    Also, she had complaints of some dull aching and fullness around the ears that would come and go.  She wears invisalign braces, and felt that this was temporomandibular disease.  I gave her a home therapy sheet.  And at the 9/30/13 visit, this problem had settled down just fine.  She had returned 4/12/16 due to a recurrence of ear pain.  This started about a month prior and she was diagnosed with otitis media and sinusitis.  Antibiotics were given, and things improved.    With regards to her temporomandibular disease, she does tell me that she notes that when she skips wearing her invisalign brace overnight the ear and facial aches do get better.    She has returned primarily to have her ears checked because of fullness since she ran out of prednisolone drops for the ears, more in the LEFT ear.    Past Medical History -   Patient Active Problem List   Diagnosis     CARDIOVASCULAR SCREENING; LDL GOAL LESS  THAN 160     Lactose intolerance     Seasonal allergic rhinitis     Allergy, food     Advanced directives, counseling/discussion     Subclinical hypothyroidism     Osteopenia     Hollenhorst plaque, left eye     Family history of breast cancer     Chronic eczematous otitis externa of both ears       Current Medications -   Current Outpatient Medications:      amoxicillin (AMOXIL) 500 MG capsule, Take 2 capsules (1,000 mg) by mouth 2 times daily for 10 days, Disp: 40 capsule, Rfl: 0     amoxicillin (AMOXIL) 500 MG tablet, Take 2 tablets (1,000 mg) by mouth 2 times daily for 10 days, Disp: 40 tablet, Rfl: 0     BENADRYL ALLERGY OR, AS NEEDED FOR ALLERGIES, Disp: , Rfl:      CLARITIN OR, AS NEEDED FOR ALLERGIES, Disp: , Rfl:      conjugated estrogens (PREMARIN) vaginal cream, Place 1 g vaginally twice a week, Disp: 30 g, Rfl: 3     EPINEPHrine (EPIPEN/ADRENACLICK/OR ANY BX GENERIC EQUIV) 0.3 MG/0.3ML injection 2-pack, Inject 0.3 mLs (0.3 mg) into the muscle as needed for anaphylaxis (or exposure to trigger), Disp: 0.6 mL, Rfl: 1     hydrocortisone 2.5 % cream, Apply sparingly to affected area 2 times daily for no more than 14 days, Disp: 30 g, Rfl: 0     mometasone (NASONEX) 50 MCG/ACT nasal spray, Spray 2 sprays into both nostrils daily for allergy prevention., Disp: 1 Box, Rfl: 3     prednisoLONE acetate (PRED FORTE) 1 % ophthalmic suspension, 4 drops into both ears twice weekly, Disp: 1 Bottle, Rfl: 5     VENTOLIN  (90 Base) MCG/ACT inhaler, , Disp: , Rfl: 0    Allergies -   Allergies   Allergen Reactions     Aloe Vera      Fish      Fluticasone      Ibuprofen Sodium      Macrodantin [Nitrofuran Derivatives]      Mineral Oil      Nuts      Seafood      Shellfish Allergy      Sulfa Drugs      Yellow Dye        Social History -   History     Social History     Marital Status:      Spouse Name: Bruno     Number of Children: 2     Years of Education: N/A     Occupational History      shop        [employer]      Social History Main Topics     Smoking status: Never Smoker      Smokeless tobacco: Never Used     Alcohol Use: Yes      Comment: occassionally     Drug Use: No     Sexual Activity:     Partners: Male     Other Topics Concern     Parent/Sibling W/ Cabg, Mi Or Angioplasty Before 65f 55m? No     Social History Narrative       Family History -   Family History   Problem Relation Age of Onset     Asthma Sister      Diabetes Sister         Type II (questionable)     Breast Cancer Sister 60     Lipids Mother      Cerebrovascular Disease Father      Thyroid Disease Other      C.A.D. No family hx of      Hypertension No family hx of      Cancer - colorectal No family hx of      Prostate Cancer No family hx of        Review of Systems - As per HPI and PMHx, otherwise 7 system review of the head and neck negative.    Physical Exam  /78   Pulse 96   Wt 59.4 kg (131 lb)   BMI 22.49 kg/m      General - The patient is well nourished and well developed, and appears to have good nutritional status.  Alert and oriented to person and place, answers questions and cooperates with examination appropriately.   Head and Face - Normocephalic and atraumatic, with no gross asymmetry noted of the contour of the facial features.  The facial nerve is intact, with strong symmetric movements.  Voice and Breathing - The patient was breathing comfortably without the use of accessory muscles. There was no wheezing, stridor, or stertor.  The patients voice was clear and strong, and had appropriate pitch and quality.  Ears - the LEFT canal was densely obstructed with thick pasty yellow cerumen.  Using procedural otoscope, alligator, and suction, I debrided the LEFT canal.  The RIGHT canal also had a lot of smei moist yellow cerumen and dead skin that I suctioned away under the binocular microscope. The tympanic membranes are normal in appearance, bony landmarks are intact.  No retraction, perforation, or masses.   Normal mobility on valsalva maneuver, with no reports of dizziness on insuflation.  No fluid or purulence was seen in the external canal or the middle ear. No evidence of infection of the middle ear or external canal, cerumen was normal in appearance.  Eyes - Extraocular movements intact, and the pupils were reactive to light.  Sclera were not icteric or injected, conjunctiva were pink and moist.  Mouth - Examination of the oral cavity showed pink, healthy oral mucosa. No lesions or ulcerations noted.  The tongue was mobile and midline, and the dentition were in good condition.    Throat - The walls of the oropharynx were smooth, pink, moist, symmetric, and had no lesions or ulcerations.  The tonsillar pillars and soft palate were symmetric.  The uvula was midline on elevation.    Neck - Normal midline excursion of the laryngotracheal complex during swallowing.  Full range of motion on passive movement.  Palpation of the occipital, submental, submandibular, internal jugular chain, and supraclavicular nodes did not demonstrate any abnormal lymph nodes or masses.  The carotid pulse was palpable bilaterally.  Palpation of the thyroid was soft and smooth, with no nodules or goiter appreciated.  The trachea was mobile and midline.  Nose - External contour is symmetric, no gross deflection or scars.  Nasal mucosa is pink and moist with no abnormal mucus.  The septum was midline and non-obstructive, turbinates of normal size and position.  No polyps, masses, or purulence noted on examination.      A/P - Jhoana Hernandez is a 65 year old female  (H60.8X3) Chronic eczematous otitis externa of both ears  (primary encounter diagnosis)  (H61.23) Bilateral impacted cerumen    Overall, Jhoana's health seems great.  Her ears are cleared now, and I have renewed the prednisolone drops to help suppress the chronic eczematous otitis externa that creates her fast build up in the canals.    Her allergic rhinitis is under good  control, and I have renewed her Nasonex and Ophthalmic prednisolone fo rthe ear canals.    Follow up in one year, sooner if there are any new issues.        Again, thank you for allowing me to participate in the care of your patient.        Sincerely,        Hussein Pepper MD

## 2020-05-29 NOTE — PROGRESS NOTES
History of Present Illness - Jhoana Hernandez is a 65 year old female last seen on 2/6/2018.     To review, for many years she has noted a sense of fullness and itching in the ears. She has tried various OTC drops before, but nothing seems to help.  She has never had ear surgery or chronic ear disease.  However, she notes that there seems to be a routine event every fall and spring where her ears seem more stuffy and full.  On exam, after debriding out the canals, her exam strongly suggested chronic eczematous otitis externa.  I would have liked to have used dermotic, but she has a severe anaphylactic nut allergy and therefore I had her use more home treatments like olive oil.  She tells me that it has seemed to help.  Her ears will get stuffy at times.    Also, she had complaints of some dull aching and fullness around the ears that would come and go.  She wears invisalign braces, and felt that this was temporomandibular disease.  I gave her a home therapy sheet.  And at the 9/30/13 visit, this problem had settled down just fine.  She had returned 4/12/16 due to a recurrence of ear pain.  This started about a month prior and she was diagnosed with otitis media and sinusitis.  Antibiotics were given, and things improved.    With regards to her temporomandibular disease, she does tell me that she notes that when she skips wearing her invisalign brace overnight the ear and facial aches do get better.    She has returned primarily to have her ears checked because of fullness since she ran out of prednisolone drops for the ears, more in the LEFT ear.    Past Medical History -   Patient Active Problem List   Diagnosis     CARDIOVASCULAR SCREENING; LDL GOAL LESS THAN 160     Lactose intolerance     Seasonal allergic rhinitis     Allergy, food     Advanced directives, counseling/discussion     Subclinical hypothyroidism     Osteopenia     Hollenhorst plaque, left eye     Family history of breast cancer     Chronic  eczematous otitis externa of both ears       Current Medications -   Current Outpatient Medications:      amoxicillin (AMOXIL) 500 MG capsule, Take 2 capsules (1,000 mg) by mouth 2 times daily for 10 days, Disp: 40 capsule, Rfl: 0     amoxicillin (AMOXIL) 500 MG tablet, Take 2 tablets (1,000 mg) by mouth 2 times daily for 10 days, Disp: 40 tablet, Rfl: 0     BENADRYL ALLERGY OR, AS NEEDED FOR ALLERGIES, Disp: , Rfl:      CLARITIN OR, AS NEEDED FOR ALLERGIES, Disp: , Rfl:      conjugated estrogens (PREMARIN) vaginal cream, Place 1 g vaginally twice a week, Disp: 30 g, Rfl: 3     EPINEPHrine (EPIPEN/ADRENACLICK/OR ANY BX GENERIC EQUIV) 0.3 MG/0.3ML injection 2-pack, Inject 0.3 mLs (0.3 mg) into the muscle as needed for anaphylaxis (or exposure to trigger), Disp: 0.6 mL, Rfl: 1     hydrocortisone 2.5 % cream, Apply sparingly to affected area 2 times daily for no more than 14 days, Disp: 30 g, Rfl: 0     mometasone (NASONEX) 50 MCG/ACT nasal spray, Spray 2 sprays into both nostrils daily for allergy prevention., Disp: 1 Box, Rfl: 3     prednisoLONE acetate (PRED FORTE) 1 % ophthalmic suspension, 4 drops into both ears twice weekly, Disp: 1 Bottle, Rfl: 5     VENTOLIN  (90 Base) MCG/ACT inhaler, , Disp: , Rfl: 0    Allergies -   Allergies   Allergen Reactions     Aloe Vera      Fish      Fluticasone      Ibuprofen Sodium      Macrodantin [Nitrofuran Derivatives]      Mineral Oil      Nuts      Seafood      Shellfish Allergy      Sulfa Drugs      Yellow Dye        Social History -   History     Social History     Marital Status:      Spouse Name: Bruno     Number of Children: 2     Years of Education: N/A     Occupational History      shop       [employer]      Social History Main Topics     Smoking status: Never Smoker      Smokeless tobacco: Never Used     Alcohol Use: Yes      Comment: occassionally     Drug Use: No     Sexual Activity:     Partners: Male     Other Topics Concern      Parent/Sibling W/ Cabg, Mi Or Angioplasty Before 65f 55m? No     Social History Narrative       Family History -   Family History   Problem Relation Age of Onset     Asthma Sister      Diabetes Sister         Type II (questionable)     Breast Cancer Sister 60     Lipids Mother      Cerebrovascular Disease Father      Thyroid Disease Other      C.A.D. No family hx of      Hypertension No family hx of      Cancer - colorectal No family hx of      Prostate Cancer No family hx of        Review of Systems - As per HPI and PMHx, otherwise 7 system review of the head and neck negative.    Physical Exam  /78   Pulse 96   Wt 59.4 kg (131 lb)   BMI 22.49 kg/m      General - The patient is well nourished and well developed, and appears to have good nutritional status.  Alert and oriented to person and place, answers questions and cooperates with examination appropriately.   Head and Face - Normocephalic and atraumatic, with no gross asymmetry noted of the contour of the facial features.  The facial nerve is intact, with strong symmetric movements.  Voice and Breathing - The patient was breathing comfortably without the use of accessory muscles. There was no wheezing, stridor, or stertor.  The patients voice was clear and strong, and had appropriate pitch and quality.  Ears - the LEFT canal was densely obstructed with thick pasty yellow cerumen.  Using procedural otoscope, alligator, and suction, I debrided the LEFT canal.  The RIGHT canal also had a lot of smei moist yellow cerumen and dead skin that I suctioned away under the binocular microscope. The tympanic membranes are normal in appearance, bony landmarks are intact.  No retraction, perforation, or masses.  Normal mobility on valsalva maneuver, with no reports of dizziness on insuflation.  No fluid or purulence was seen in the external canal or the middle ear. No evidence of infection of the middle ear or external canal, cerumen was normal in appearance.  Eyes -  Extraocular movements intact, and the pupils were reactive to light.  Sclera were not icteric or injected, conjunctiva were pink and moist.  Mouth - Examination of the oral cavity showed pink, healthy oral mucosa. No lesions or ulcerations noted.  The tongue was mobile and midline, and the dentition were in good condition.    Throat - The walls of the oropharynx were smooth, pink, moist, symmetric, and had no lesions or ulcerations.  The tonsillar pillars and soft palate were symmetric.  The uvula was midline on elevation.    Neck - Normal midline excursion of the laryngotracheal complex during swallowing.  Full range of motion on passive movement.  Palpation of the occipital, submental, submandibular, internal jugular chain, and supraclavicular nodes did not demonstrate any abnormal lymph nodes or masses.  The carotid pulse was palpable bilaterally.  Palpation of the thyroid was soft and smooth, with no nodules or goiter appreciated.  The trachea was mobile and midline.  Nose - External contour is symmetric, no gross deflection or scars.  Nasal mucosa is pink and moist with no abnormal mucus.  The septum was midline and non-obstructive, turbinates of normal size and position.  No polyps, masses, or purulence noted on examination.      A/P - Jhoana Hernandez is a 65 year old female  (H60.8X3) Chronic eczematous otitis externa of both ears  (primary encounter diagnosis)  (H61.23) Bilateral impacted cerumen    Overall, Jhoana's health seems great.  Her ears are cleared now, and I have renewed the prednisolone drops to help suppress the chronic eczematous otitis externa that creates her fast build up in the canals.    Her allergic rhinitis is under good control, and I have renewed her Nasonex and Ophthalmic prednisolone fo rthe ear canals.    Follow up in one year, sooner if there are any new issues.

## 2021-03-22 ENCOUNTER — NURSE TRIAGE (OUTPATIENT)
Dept: NURSING | Facility: CLINIC | Age: 66
End: 2021-03-22

## 2021-03-22 NOTE — TELEPHONE ENCOUNTER
Triage call:   Trying to figure out what vaccine would work from her  Has allergies to nuts and fish  Advised for her to look at the CDC's website for vaccination information    Patient would like a message sent to her PCP about which vaccine would be recommended for her, she is trying to get the J&J vaccine but that is not close to her home. Wondering if the Pfizer would be safe for her since that is near her home. She would like her PCP to advise on which vaccine would be best for her.     Kaylen Mae RN BSN 3/22/2021 9:48 AM     COVID 19 Nurse Triage Plan/Patient Instructions    Please be aware that novel coronavirus (COVID-19) may be circulating in the community. If you develop symptoms such as fever, cough, or SOB or if you have concerns about the presence of another infection including coronavirus (COVID-19), please contact your health care provider or visit [email or web address].     Disposition/Instructions    Home care recommended. Follow home care protocol based instructions.    Thank you for taking steps to prevent the spread of this virus.  o Limit your contact with others.  o Wear a simple mask to cover your cough.  o Wash your hands well and often.    Resources    [facility]: About COVID-19: [email or web address]    CDC: What to Do If You're Sick: www.cdc.gov/coronavirus/2019-ncov/about/steps-when-sick.html    CDC: Ending Home Isolation: www.cdc.gov/coronavirus/2019-ncov/hcp/disposition-in-home-patients.html     CDC: Caring for Someone: www.cdc.gov/coronavirus/2019-ncov/if-you-are-sick/care-for-someone.html     [facility]: Interim Guidance for Hospital Discharge to Home: www.health.state.[place]/diseases/coronavirus/hcp/hospdischarge.pdf    [facility] clinical trials (COVID-19 research studies): [email or web address]     Below are the COVID-19 hotlines at the [place] Department of Health ([facility]). Interpreters are available.   o For health questions:  Call 353-070-6782 or 1-231.653.9036 (7 a.m. to 7 p.m.)  o For questions about schools and childcare: Call 471-739-6444 or 1-609.452.5861 (7 a.m. to 7 p.m.)         Reason for Disposition    COVID-19 vaccine, Frequently Asked Questions (FAQs)    Additional Information    Negative: [1] Difficulty breathing or swallowing AND [2] starts within 2 hours after injection    Negative: Sounds like a life-threatening emergency to the triager    Negative: [1] COVID-19 exposure AND [2] no symptoms    Negative: [1] Typical COVID-19 symptoms AND [2] symptoms that are NOT expected from vaccine (e.g., cough, difficulty breathing, loss of taste or smell, runny nose, sore throat)    Negative: [1] Typical COVID-19 symptoms AND [2] started > 3 days after getting vaccine    Negative: Fever > 104 F (40 C)    Negative: Sounds like a severe, unusual reaction to the triager    Negative: [1] Redness or red streak around the injection site AND [2] started > 48 hours after getting vaccine AND [3] fever    Negative: [1] Fever > 101 F (38.3 C) AND [2] age > 60 AND [3] started > 48 hours after getting vaccine    Negative: [1] Fever > 100.0 F (37.8 C) AND [2] bedridden (e.g., nursing home patient, CVA, chronic illness, recovering from surgery) AND [3] started > 48 hours after getting vaccine    Negative: [1] Fever > 100.0 F (37.8 C) AND [2] diabetes mellitus or weak immune system (e.g., HIV positive, cancer chemo, splenectomy, organ transplant, chronic steroids) AND [3] started > 48 hours after getting vaccine    Negative: [1] Redness or red streak around the injection site AND [2] started > 48 hours after getting vaccine AND [3] no fever  (Exception: red area < 1 inch or 2.5 cm wide)    Negative: [1] Pain, tenderness, or swelling at the injection site AND [2] over 3 days (72 hours) since vaccine AND [3] getting worse    Negative: Fever > 100.0 F (37.8 C) present > 3 days (72 hours)    Negative: [1] Fever > 100.0 F (37.8 C) AND [2] healthcare  worker    Negative: [1] Pain, tenderness, or swelling at the injection site AND [2] lasts > 7 days    Negative: [1] Requesting COVID-19 vaccine AND [2] healthcare worker (e.g., EMS first responders, doctors, nurses)    Negative: [1] Requesting COVID-19 vaccine AND [2] resident of a long-term care facility (e.g., nursing home)    Negative: [1] Requesting COVID-19 vaccine AND [2] vaccine available in the community for this patient group    Negative: COVID-19 vaccine, injection site reaction (e.g., pain, redness, swelling), question about    Negative: COVID-19 vaccine, systemic reactions (e.g., fatigue, fever, muscle aches), questions about    Protocols used: CORONAVIRUS (COVID-19) VACCINE QUESTIONS AND [id number]-ZH 1.3

## 2021-03-31 NOTE — TELEPHONE ENCOUNTER
The only allergy that we are worried about is Miralax or Polyethylene Glycol. The other allergies do not seem to have an effect. All 3 shots are very good with coverage and none have more allergic reactions than the others. These run at about 5 in a million shots and are treatable usually in the first 15 minutes with an epinephrine shot. I recommend getting which ever shot is most convenient.   Luisa Acosta MD

## 2021-03-31 NOTE — TELEPHONE ENCOUNTER
Called and relayed message to patient. She has a vaccine appointment at [facility] tomorrow.     Emily Plummer RN

## 2021-04-01 ENCOUNTER — IMMUNIZATION (OUTPATIENT)
Dept: PEDIATRICS | Facility: CLINIC | Age: 66
End: 2021-04-01
Payer: MEDICARE

## 2021-04-01 PROCEDURE — 0001A PR COVID VAC PFIZER DIL RECON 30 MCG/0.3 ML IM: CPT

## 2021-04-01 PROCEDURE — 91300 PR COVID VAC PFIZER DIL RECON 30 MCG/0.3 ML IM: CPT

## 2021-04-09 ENCOUNTER — OFFICE VISIT (OUTPATIENT)
Dept: FAMILY MEDICINE | Facility: CLINIC | Age: 66
End: 2021-04-09
Payer: MEDICARE

## 2021-04-09 VITALS
WEIGHT: 132 LBS | BODY MASS INDEX: 22.53 KG/M2 | DIASTOLIC BLOOD PRESSURE: 75 MMHG | HEART RATE: 75 BPM | OXYGEN SATURATION: 98 % | SYSTOLIC BLOOD PRESSURE: 136 MMHG | HEIGHT: 64 IN

## 2021-04-09 DIAGNOSIS — Z91.018 ALLERGY, FOOD: ICD-10-CM

## 2021-04-09 DIAGNOSIS — Z13.1 SCREENING FOR DIABETES MELLITUS: ICD-10-CM

## 2021-04-09 DIAGNOSIS — Z80.3 FAMILY HISTORY OF BREAST CANCER: ICD-10-CM

## 2021-04-09 DIAGNOSIS — E03.8 SUBCLINICAL HYPOTHYROIDISM: ICD-10-CM

## 2021-04-09 DIAGNOSIS — Z13.6 CARDIOVASCULAR SCREENING; LDL GOAL LESS THAN 160: ICD-10-CM

## 2021-04-09 DIAGNOSIS — Z12.31 BREAST CANCER SCREENING BY MAMMOGRAM: ICD-10-CM

## 2021-04-09 DIAGNOSIS — R79.89 ELEVATED TSH: ICD-10-CM

## 2021-04-09 DIAGNOSIS — Z78.0 ASYMPTOMATIC MENOPAUSAL STATE: ICD-10-CM

## 2021-04-09 DIAGNOSIS — Z00.00 ENCOUNTER FOR MEDICARE ANNUAL WELLNESS EXAM: Primary | ICD-10-CM

## 2021-04-09 DIAGNOSIS — R25.9 INVOLUNTARY MOVEMENTS ON EXAMINATION: ICD-10-CM

## 2021-04-09 DIAGNOSIS — M71.349 SYNOVIAL CYST OF HAND: ICD-10-CM

## 2021-04-09 DIAGNOSIS — Z28.9 VACCINATION NOT CARRIED OUT: ICD-10-CM

## 2021-04-09 DIAGNOSIS — M85.80 OSTEOPENIA, UNSPECIFIED LOCATION: ICD-10-CM

## 2021-04-09 LAB
CHOLEST SERPL-MCNC: 255 MG/DL
GLUCOSE SERPL-MCNC: 109 MG/DL (ref 70–99)
HDLC SERPL-MCNC: 85 MG/DL
LDLC SERPL CALC-MCNC: 148 MG/DL
NONHDLC SERPL-MCNC: 170 MG/DL
T3 SERPL-MCNC: 100 NG/DL (ref 60–181)
T4 FREE SERPL-MCNC: 0.85 NG/DL (ref 0.76–1.46)
TRIGL SERPL-MCNC: 108 MG/DL
TSH SERPL DL<=0.005 MIU/L-ACNC: 4.83 MU/L (ref 0.4–4)

## 2021-04-09 PROCEDURE — 80061 LIPID PANEL: CPT | Performed by: FAMILY MEDICINE

## 2021-04-09 PROCEDURE — G0438 PPPS, INITIAL VISIT: HCPCS | Performed by: FAMILY MEDICINE

## 2021-04-09 PROCEDURE — 84480 ASSAY TRIIODOTHYRONINE (T3): CPT | Performed by: FAMILY MEDICINE

## 2021-04-09 PROCEDURE — 86376 MICROSOMAL ANTIBODY EACH: CPT | Performed by: FAMILY MEDICINE

## 2021-04-09 PROCEDURE — 36415 COLL VENOUS BLD VENIPUNCTURE: CPT | Performed by: FAMILY MEDICINE

## 2021-04-09 PROCEDURE — 82947 ASSAY GLUCOSE BLOOD QUANT: CPT | Performed by: FAMILY MEDICINE

## 2021-04-09 PROCEDURE — 84443 ASSAY THYROID STIM HORMONE: CPT | Performed by: FAMILY MEDICINE

## 2021-04-09 PROCEDURE — 84439 ASSAY OF FREE THYROXINE: CPT | Performed by: FAMILY MEDICINE

## 2021-04-09 RX ORDER — EPINEPHRINE 0.3 MG/.3ML
0.3 INJECTION SUBCUTANEOUS PRN
Qty: 0.6 ML | Refills: 1 | Status: SHIPPED | OUTPATIENT
Start: 2021-04-09 | End: 2022-05-17

## 2021-04-09 ASSESSMENT — MIFFLIN-ST. JEOR: SCORE: 1118.75

## 2021-04-09 ASSESSMENT — PAIN SCALES - GENERAL: PAINLEVEL: NO PAIN (0)

## 2021-04-09 ASSESSMENT — ACTIVITIES OF DAILY LIVING (ADL): CURRENT_FUNCTION: NO ASSISTANCE NEEDED

## 2021-04-09 NOTE — PROGRESS NOTES
"  SUBJECTIVE:   Jhoana Hernandez is a 66 year old female who presents for Preventive Visit.      Patient has been advised of split billing requirements and indicates understanding: Yes  Are you in the first 12 months of your Medicare Part B coverage?  Yes,  Visual Acuity:  Right Eye: 20/32   Left Eye: 20/2  Both Eyes: 20/25    Physical Health:    In general, how would you rate your overall physical health? good    Outside of work, how many days during the week do you exercise? 2-3 days/week    Outside of work, approximately how many minutes a day do you exercise?15-30 minutes    If you drink alcohol do you typically have >3 drinks per day or >7 drinks per week? No    Do you usually eat at least 4 servings of fruit and vegetables a day, include whole grains & fiber and avoid regularly eating high fat or \"junk\" foods? Yes    Do you have any problems taking medications regularly?  No    Do you have any side effects from medications? none    Needs assistance for the following daily activities: no assistance needed    Which of the following safety concerns are present in your home?  none identified     Hearing impairment: No    In the past 6 months, have you been bothered by leaking of urine? no    Mental Health:    In general, how would you rate your overall mental or emotional health? good  PHQ-2 Score: (P) 0    Breast CA Risk Assessment (FHS-7) 4/9/2021   Did any of your first-degree relatives have breast or ovarian cancer? Yes   Did any of your relatives have bilateral breast cancer? No   Did any man in your family have breast cancer? No   Did any woman in your family have breast and ovarian cancer? No   Did any woman in your family have breast cancer before age 50 y? No   Do you have 2 or more relatives with breast and/or ovarian cancer? No   Do you have 2 or more relatives with breast and/or bowel cancer? No       Do you feel safe in your environment? YES    Have you ever done Advance Care Planning? (For " example, a Health Directive, POLST, or a discussion with a medical provider or your loved ones about your wishes): Yes, advance care planning is on file.    Additional concerns to address?      Fall risk:  Fallen 2 or more times in the past year?: No  Any fall with injury in the past year?: No    Cognitive Screenin) Repeat 3 items (Leader, Season, Table)  yes  2) Clock draw: NORMAL  3) 3 item recall: Recalls 3 objects  Results: NORMAL clock, 3 items recalled: COGNITIVE IMPAIRMENT LESS LIKELY    Mini-CogTM Copyright JANINA Richey. Licensed by the author for use in [facility]; reprinted with permission (rasheed@[facility]). All rights reserved.      Do you have sleep apnea, excessive snoring or daytime drowsiness?: no        Past medical, family, and social histories, medications, and allergies are reviewed and updated in [facility].      Social History     Tobacco Use     Smoking status: Never Smoker     Smokeless tobacco: Never Used   Substance Use Topics     Alcohol use: Yes     Comment: occassionally                           Current providers sharing in care for this patient include:   Patient Care Team:  Nelson Camp MD as PCP - General (Family Practice)  Nakia Condon MD as Assigned PCP  Hussein Pepper MD as Assigned Surgical Provider    The following health maintenance items are reviewed in Epic and correct as of today:  Health Maintenance   Topic Date Due     ADVANCE CARE PLANNING  2017     INFLUENZA VACCINE (1) Never done     MAMMO SCREENING  2020     FALL RISK ASSESSMENT  2020     Pneumococcal Vaccine: 65+ Years (2 of 2 - PPSV23) 2020     COVID-19 Vaccine (2 - Pfizer 2-dose series) 2021     MEDICARE ANNUAL WELLNESS VISIT  2022     COLORECTAL CANCER SCREENING  2022     LIPID  2024     DTAP/TDAP/TD IMMUNIZATION (3 - Td) 2028     DEXA  2031     HEPATITIS C SCREENING  Completed     PHQ-2  Completed     ZOSTER IMMUNIZATION   "Completed     Pneumococcal Vaccine: Pediatrics (0 to 5 Years) and At-Risk Patients (6 to 64 Years)  Aged Out     IPV IMMUNIZATION  Aged Out     MENINGITIS IMMUNIZATION  Aged Out     HEPATITIS B IMMUNIZATION  Aged Out         ROS:  Constitutional, HEENT, cardiovascular, pulmonary, gi and gu systems are negative, except as otherwise noted.    OBJECTIVE:   /75 (BP Location: Left arm, Patient Position: Chair, Cuff Size: Adult Regular)   Pulse 75   Ht 1.626 m (5' 4\")   Wt 59.9 kg (132 lb)   SpO2 98%   BMI 22.66 kg/m   Estimated body mass index is 22.66 kg/m  as calculated from the following:    Height as of this encounter: 1.626 m (5' 4\").    Weight as of this encounter: 59.9 kg (132 lb).  EXAM:   GENERAL APPEARANCE: healthy, alert and no distress  EYES: Eyes grossly normal to inspection, PERRL and conjunctivae and sclerae normal  HENT: ear canals and TM's normal, nose and mouth without ulcers or lesions, oropharynx clear and oral mucous membranes moist  NECK: no adenopathy, no asymmetry, masses, or scars and thyroid normal to palpation  RESP: lungs clear to auscultation - no rales, rhonchi or wheezes  BREAST: normal without masses, tenderness or nipple discharge and no palpable axillary masses or adenopathy  CV: regular rate and rhythm, normal S1 S2, no S3 or S4, no murmur, click or rub, no peripheral edema and peripheral pulses strong  ABDOMEN: soft, nontender, no hepatosplenomegaly, no masses and bowel sounds normal  MS: Small cystic lesion on the palm of left her hand  SKIN: no suspicious lesions or rashes  NEURO: Noted to have recurrent movement of the left shoulder (likely shrug) proximately every 20 seconds, otherwise normal strength and tone, sensory exam grossly normal, mentation intact and speech normal  PSYCH: mentation appears normal and affect normal/bright    Diagnostic Test Results:  Labs reviewed in Epic    ASSESSMENT / PLAN:   (Z00.00) Encounter for Medicare annual wellness exam  (primary " encounter diagnosis)  Comment:   Plan: DX Hip/Pelvis/Spine, Lipid panel reflex to         direct LDL Non-fasting, Glucose,  MA         SCREENING DIGITAL BILAT - Future  (s+30)        Follow-up in 1 year    (R25.9) Involuntary movements on examination  Comment: The patient was unaware of these movements until I pointed them out to her.  I would like her to see a neurologist for a formal, precise diagnosis, prognosis, etc.  Plan: NEUROLOGY ADULT REFERRAL            (M71.349) Synovial cyst of hand  Comment:   Plan: Orthopedic & Spine  Referral            (E03.9) Subclinical hypothyroidism  (R79.89) Elevated TSH  Comment: Some labs have been preordered  Plan: TSH, T4 free, total T3        Monitor periodically    (Z13.6) CARDIOVASCULAR SCREENING; LDL GOAL LESS THAN 160  Comment:   Plan: Lipid panel reflex to direct LDL Non-fasting            (Z13.1) Screening for diabetes mellitus  Comment:   Plan: Glucose            (M85.80) Osteopenia, unspecified location  Comment:   Plan: DX Hip/Pelvis/Spine        Recheck in 2 to 3 years    (Z78.0) Menopausal state   Comment: Refill request  Plan: conjugated estrogens (PREMARIN) 0.625 MG/GM         vaginal cream, DX Hip/Pelvis/Spine          (Z91.018) Allergy, food  Comment: Prescription update  Plan: EPINEPHrine (ANY BX GENERIC EQUIV) 0.3 MG/0.3ML        injection 2-pack            (Z80.3) Family history of breast cancer  (Z12.31) Breast cancer screening by mammogram  Comment: I suggested she can schedule her mammogram and DEXA on the same day  Plan: MA SCREENING DIGITAL BILAT - Future          (s+30)            (Z28.9) Vaccination not carried out  Comment: generally doesn't receive influenza vaccine,   Plan: no other vaccine given today since she received COVID-19 vaccine 4/1/21    COUNSELING:  Reviewed preventive health counseling, as reflected in patient instructions  Special attention given to:       Immunizations    Declined: Influenza (as usual), and pneumococcal and  "Zoster due to Contraindicated                Osteoporosis prevention/bone health       (Vianey)menopause management    Estimated body mass index is 22.66 kg/m  as calculated from the following:    Height as of this encounter: 1.626 m (5' 4\").    Weight as of this encounter: 59.9 kg (132 lb).        She reports that she has never smoked. She has never used smokeless tobacco.    Appropriate preventive services were discussed with this patient, including applicable screening as appropriate for cardiovascular disease, diabetes, osteopenia/osteoporosis, and glaucoma.  As appropriate for age/gender, discussed screening for colorectal cancer, prostate cancer, breast cancer, and cervical cancer. Checklist reviewing preventive services available has been given to the patient.    Reviewed patients plan of care and provided an AVS. The Basic Care Plan (routine screening as documented in Health Maintenance) for Jhoana meets the Care Plan requirement. This Care Plan has been established and reviewed with the Patient.    Counseling Resources:  ATP IV Guidelines  Pooled Cohorts Equation Calculator  Breast Cancer Risk Calculator  BRCA-Related Cancer Risk Assessment: FHS-7 Tool  FRAX Risk Assessment  ICSI Preventive Guidelines  Dietary Guidelines for Americans, 2010  [employer]'s MyPlate  ASA Prophylaxis  Lung CA Screening    Nelson Camp MD  [facility]  Answers for HPI/ROS submitted by the patient on 4/9/2021   Annual Exam:  In general, how would you rate your overall physical health?: good  Frequency of exercise:: 2-3 days/week  Do you usually eat at least 4 servings of fruit and vegetables a day, include whole grains & fiber, and avoid regularly eating high fat or \"junk\" foods? : Yes  Taking medications regularly:: Yes  Medication side effects:: None  Activities of Daily Living: no assistance needed  Home safety: no safety concerns identified  Hearing Impairment:: no hearing concerns  In the past 6 " months, have you been bothered by leaking of urine?: No  In general, how would you rate your overall mental or emotional health?: good  Additional concerns today:: Yes  Duration of exercise:: 15-30 minutes

## 2021-04-09 NOTE — PATIENT INSTRUCTIONS
At [facility], we strive to deliver an exceptional experience to you, every time we see you. If you receive a survey, please complete it as we do value your feedback.  If you have MyChart, you can expect to receive results automatically within 24 hours of their completion.  Your provider will send a note interpreting your results as well.   If you do not have MyChart, you should receive your results in about a week by mail.    Your care team:                            Family Medicine Internal Medicine   MD Eric Cheema MD Shantel Branch-Fleming, MD Srinivasa Vaka, MD Katya Belousova, PASamirC  Rebeca Penaloza, APRN CNP    Marcus Stevenson, MD Pediatrics   Stephen Escobar, PAGISSELL Sheffield, CNP MD Nafisa Wong APRN CNP   MD Lupis Maddox MD Deborah Mielke, MD Arielle Bae, APRN [facility]      Clinic hours: Monday - Thursday 7 am-6 pm; Fridays 7 am-5 pm.   Urgent care: Monday - Friday 10 am- 8 pm; Saturday and Sunday 9 am-5 pm.    Clinic: [phone]       [facility]: Monday - Thursday 8 am - 7 pm; Friday 8 am - 6 pm  [facility]: [phone]     Use www.[email or web address].org for 24/7 diagnosis and treatment of dozens of conditions.    Patient Education   Personalized Prevention Plan  You are due for the preventive services outlined below.  Your care team is available to assist you in scheduling these services.  If you have already completed any of these items, please share that information with your care team to update in your medical record.  Health Maintenance Due   Topic Date Due     Discuss Advance Care Planning  04/03/2017     Flu Vaccine (1) Never done     Mammogram  09/04/2020     Annual Wellness Visit  09/09/2020     FALL RISK ASSESSMENT  09/09/2020     Pneumococcal Vaccine (2 of 2 - PPSV23) 09/09/2020        Personalized Prevention Plan  You are due for the preventive services  outlined below.  Your care team is available to assist you in scheduling these services.  If you have already completed any of these items, please share that information with your care team to update in your medical record.  Health Maintenance Due   Topic Date Due     Discuss Advance Care Planning  04/03/2017     Flu Vaccine (1) Never done     Mammogram  09/04/2020     FALL RISK ASSESSMENT  09/09/2020     Pneumococcal Vaccine (2 of 2 - PPSV23) 09/09/2020       .Please call [facility]: [phone] to schedule your mammogram & bone density scan at [facility].

## 2021-04-12 LAB — THYROPEROXIDASE AB SERPL-ACNC: 23 IU/ML

## 2021-04-12 NOTE — PROGRESS NOTES
SUBJECTIVE:   Jhoana Hernandez is a 66 year old female who is seen in consultation at the request of Dr. Camp for evaluation of a left palm mass that has been present for approximately 2 months.  1st noted after shoveling 2 months ago . The mass is located palm near the A1 pulley of the right 4th flexor tendon..   The mass is much smaller and less painful than when it started.  She has something similar on the left hand 3rd, but it is not painful at all.    Treatments tried to this point: none    Orthopedic PMH: none    Review of Systems:  Constitutional:  NEGATIVE for fever, chills, change in weight  Integumentary/Skin:  NEGATIVE for worrisome rashes, moles or lesions  Eyes:  NEGATIVE for vision changes or irritation  ENT/Mouth:  NEGATIVE for ear, mouth and throat problems  Resp:  NEGATIVE for significant cough or SOB  Breast:  NEGATIVE for masses, tenderness or discharge  CV:  NEGATIVE for chest pain, palpitations or peripheral edema  GI:  NEGATIVE for nausea, abdominal pain, heartburn, or change in bowel habits  :  Negative   Musculoskeletal:  See HPI above  Neuro:  NEGATIVE for weakness, dizziness or paresthesias  Endocrine:  NEGATIVE for temperature intolerance, skin/hair changes  Heme/allergy/immune:  NEGATIVE for bleeding problems  Psychiatric:  NEGATIVE for changes in mood or affect    Past Medical History:   Past Medical History:   Diagnosis Date     Gastritis      IBS (irritable bowel syndrome)      Infertility, female     had IVF     Lactose intolerance      Osteopenia 2011     Ovarian cyst     bilateral serous cystadenomas     Recurrent UTI      Seasonal allergic rhinitis      Past Surgical History:   Past Surgical History:   Procedure Laterality Date     C TOTAL ABDOM HYSTERECTOMY      with BSO      SECTION  ,      COLONOSCOPY  2012    Procedure: COLONOSCOPY;  COLONOSCOPY, ABDOMINAL PAIN;  Surgeon: Maco Mascorro MD;  Location: [facility] OR     HC EXCISION  BREAST LESION, OPEN >=1  2004    LT Benign     HYSTERECTOMY, PAP NO LONGER INDICATED  2011    JOSAFAT with BSO- benign bilateral serous cystadenomas     LAPAROSCOPY  1986    ov cystectomy     Family History:   Family History   Problem Relation Age of Onset     Asthma Sister      Thyroid Disease Sister      Lipids Mother      Food Allergy Mother      Cerebrovascular Disease Father      Asthma Father      Thyroid Disease Brother      Diabetes Type 2  Sister      Breast Cancer Sister 60     Graves' disease Sister      C.A.D. No family hx of      Hypertension No family hx of      Cancer - colorectal No family hx of      Prostate Cancer No family hx of      Social History:   Social History     Tobacco Use     Smoking status: Never Smoker     Smokeless tobacco: Never Used   Substance Use Topics     Alcohol use: Yes     Comment: occassionally     OBJECTIVE:  Physical Exam:  /78 (BP Location: Left arm, Patient Position: Sitting)   Pulse 72   SpO2 98%   General Appearance: healthy, alert and no distress   Skin: no suspicious lesions or rashes  Neuro: Normal strength and tone, mentation intact and speech normal  Vascular: good pulses, and cappillary refill   Lymph: no lymphadenopathy   Psych:  mentation appears normal and affect normal/bright  Resp: no increased work of breathing     Right hand:  Inspection: There is a mass located on the palmar side of the hand over the A1 pulley region of the fourth flexor tendon.  It is approximately 7 mm in diameter, and is firm, nontender.  It is somewhat mobile.  It does transilluminate.    Left hand there is a similar mass located close to the A1 pulley of the third finger in the palm.  It is smaller, nontender and a little bit less firm.    X-rays:  none     ASSESSMENT:   Synovial cysts of bilateral flexor tendon sheaths    PLAN:   Since the masses are not painful and have shrunken in size there is no necessity to remove them.  I talked her about the procedure of excision, should  one or both become bothersome. Local anesthesia.    Return to clinic: as needed     ANTONIO Porras MD  Dept. Orthopedic Surgery  [facility]

## 2021-04-13 ENCOUNTER — OFFICE VISIT (OUTPATIENT)
Dept: ORTHOPEDICS | Facility: CLINIC | Age: 66
End: 2021-04-13
Payer: MEDICARE

## 2021-04-13 ENCOUNTER — PREP FOR PROCEDURE (OUTPATIENT)
Dept: ORTHOPEDICS | Facility: CLINIC | Age: 66
End: 2021-04-13

## 2021-04-13 VITALS — SYSTOLIC BLOOD PRESSURE: 132 MMHG | OXYGEN SATURATION: 98 % | DIASTOLIC BLOOD PRESSURE: 78 MMHG | HEART RATE: 72 BPM

## 2021-04-13 DIAGNOSIS — M71.349 SYNOVIAL CYST OF HAND: ICD-10-CM

## 2021-04-13 DIAGNOSIS — R22.31 MASS OF FINGER OF RIGHT HAND: Primary | ICD-10-CM

## 2021-04-13 PROCEDURE — 99203 OFFICE O/P NEW LOW 30 MIN: CPT | Performed by: ORTHOPAEDIC SURGERY

## 2021-04-13 ASSESSMENT — PAIN SCALES - GENERAL: PAINLEVEL: MODERATE PAIN (4)

## 2021-04-13 NOTE — LETTER
4/13/2021         RE: Jhoana Hernandez  [address] [facility] [id number]        Dear Colleague,    Thank you for referring your patient, Jhoana Hernandez, to the [facility]. Please see a copy of my visit note below.    SUBJECTIVE:   Jhoana Hernandez is a 66 year old female who is seen in consultation at the request of Dr. Camp for evaluation of a left palm mass that has been present for approximately 2 months.  1st noted after shoveling 2 months ago . The mass is located palm near the A1 pulley of the right 4th flexor tendon..   The mass is much smaller and less painful than when it started.  She has something similar on the left hand 3rd, but it is not painful at all.    Treatments tried to this point: none    Orthopedic PMH: none    Review of Systems:  Constitutional:  NEGATIVE for fever, chills, change in weight  Integumentary/Skin:  NEGATIVE for worrisome rashes, moles or lesions  Eyes:  NEGATIVE for vision changes or irritation  ENT/Mouth:  NEGATIVE for ear, mouth and throat problems  Resp:  NEGATIVE for significant cough or SOB  Breast:  NEGATIVE for masses, tenderness or discharge  CV:  NEGATIVE for chest pain, palpitations or peripheral edema  GI:  NEGATIVE for nausea, abdominal pain, heartburn, or change in bowel habits  :  Negative   Musculoskeletal:  See HPI above  Neuro:  NEGATIVE for weakness, dizziness or paresthesias  Endocrine:  NEGATIVE for temperature intolerance, skin/hair changes  Heme/allergy/immune:  NEGATIVE for bleeding problems  Psychiatric:  NEGATIVE for changes in mood or affect    Past Medical History:   Past Medical History:   Diagnosis Date     Gastritis      IBS (irritable bowel syndrome)      Infertility, female 1989    had IVF     Lactose intolerance      Osteopenia 11/4/2011     Ovarian cyst 2011    bilateral serous cystadenomas     Recurrent UTI      Seasonal allergic rhinitis      Past Surgical History:   Past Surgical  History:   Procedure Laterality Date     C TOTAL ABDOM HYSTERECTOMY      with BSO      SECTION  ,      COLONOSCOPY  2012    Procedure: COLONOSCOPY;  COLONOSCOPY, ABDOMINAL PAIN;  Surgeon: Maco Mascorro MD;  Location: [facility] OR     HC EXCISION BREAST LESION, OPEN >=1      LT Benign     HYSTERECTOMY, PAP NO LONGER INDICATED      JOSAFAT with BSO- benign bilateral serous cystadenomas     LAPAROSCOPY      ov cystectomy     Family History:   Family History   Problem Relation Age of Onset     Asthma Sister      Thyroid Disease Sister      Lipids Mother      Food Allergy Mother      Cerebrovascular Disease Father      Asthma Father      Thyroid Disease Brother      Diabetes Type 2  Sister      Breast Cancer Sister 60     Graves' disease Sister      C.A.D. No family hx of      Hypertension No family hx of      Cancer - colorectal No family hx of      Prostate Cancer No family hx of      Social History:   Social History     Tobacco Use     Smoking status: Never Smoker     Smokeless tobacco: Never Used   Substance Use Topics     Alcohol use: Yes     Comment: occassionally     OBJECTIVE:  Physical Exam:  /78 (BP Location: Left arm, Patient Position: Sitting)   Pulse 72   SpO2 98%   General Appearance: healthy, alert and no distress   Skin: no suspicious lesions or rashes  Neuro: Normal strength and tone, mentation intact and speech normal  Vascular: good pulses, and cappillary refill   Lymph: no lymphadenopathy   Psych:  mentation appears normal and affect normal/bright  Resp: no increased work of breathing     Right hand:  Inspection: There is a mass located on the palmar side of the hand over the A1 pulley region of the fourth flexor tendon.  It is approximately 7 mm in diameter, and is firm, nontender.  It is somewhat mobile.  It does transilluminate.    Left hand there is a similar mass located close to the A1 pulley of the third finger in the palm.  It is smaller, nontender  and a little bit less firm.    X-rays:  none     ASSESSMENT:   Synovial cysts of bilateral flexor tendon sheaths    PLAN:   Since the masses are not painful and have shrunken in size there is no necessity to remove them.  I talked her about the procedure of excision, should one or both become bothersome. Local anesthesia.    Return to clinic: as needed     ANTONIO Porras MD  Dept. Orthopedic Surgery  [facility]           Again, thank you for allowing me to participate in the care of your patient.        Sincerely,        Ian Porras MD

## 2021-04-16 ENCOUNTER — ANCILLARY PROCEDURE (OUTPATIENT)
Dept: BONE DENSITY | Facility: CLINIC | Age: 66
End: 2021-04-16
Attending: FAMILY MEDICINE
Payer: MEDICARE

## 2021-04-16 ENCOUNTER — ANCILLARY PROCEDURE (OUTPATIENT)
Dept: MAMMOGRAPHY | Facility: CLINIC | Age: 66
End: 2021-04-16
Attending: FAMILY MEDICINE
Payer: MEDICARE

## 2021-04-16 DIAGNOSIS — Z00.00 ENCOUNTER FOR MEDICARE ANNUAL WELLNESS EXAM: ICD-10-CM

## 2021-04-16 DIAGNOSIS — Z78.0 ASYMPTOMATIC MENOPAUSAL STATE: ICD-10-CM

## 2021-04-16 DIAGNOSIS — Z80.3 FAMILY HISTORY OF BREAST CANCER: ICD-10-CM

## 2021-04-16 DIAGNOSIS — Z12.31 BREAST CANCER SCREENING BY MAMMOGRAM: ICD-10-CM

## 2021-04-16 DIAGNOSIS — M85.80 OSTEOPENIA, UNSPECIFIED LOCATION: ICD-10-CM

## 2021-04-16 PROCEDURE — 77067 SCR MAMMO BI INCL CAD: CPT | Mod: GC | Performed by: RADIOLOGY

## 2021-04-16 PROCEDURE — 77063 BREAST TOMOSYNTHESIS BI: CPT | Mod: GC | Performed by: RADIOLOGY

## 2021-04-16 PROCEDURE — 77080 DXA BONE DENSITY AXIAL: CPT | Performed by: RADIOLOGY

## 2021-04-22 ENCOUNTER — IMMUNIZATION (OUTPATIENT)
Dept: PEDIATRICS | Facility: CLINIC | Age: 66
End: 2021-04-22
Attending: INTERNAL MEDICINE
Payer: MEDICARE

## 2021-04-22 PROCEDURE — 91300 PR COVID VAC PFIZER DIL RECON 30 MCG/0.3 ML IM: CPT

## 2021-04-22 PROCEDURE — 0002A PR COVID VAC PFIZER DIL RECON 30 MCG/0.3 ML IM: CPT

## 2021-05-10 NOTE — PROGRESS NOTES
Diagnosis/Summary/Recommendations:    PATIENT: Jhoana Hernandez  66 year old female     : 1955    GIOVANA: May 17, 2021    [address] [facility] [address] [phone] (H)   [phone] (M)   [email or web address]  Bruno Hernandez      2 kids    Assessment:  Right handed  No tremor noticed   (R25.9) Abnormal involuntary movement  (primary encounter diagnosis)  Neck issues  Head possibly shaking  Seen possibly neurology at [place]    No history of tremor, viv disease or parkinson in her family.    Gait/Balance/Falls   No fall in past year    Exercise/Therapy   Walks, bikes, in  yard    Cognitive/Driving   No cognitive changes    Mood   No mood problems    Hallucinations/delusions   no    Sleep   Goes to bed around 10pm  Wakes up at 6/7am  May be mild in snoring  No dream enactment behavior    No loss of smell    Bladder   No problems    GI/Constipation/GERD  Lactose intolerance  Gastritis  IBS - denies this     ENDO   Osteopenia  Gestational diabetes  Conjugate estrogens premarin 0.625 mg/gm vaginal cream  t4 normal, t3 normal, tpo normal  tsh slightly up      Cardio/heart   denies    Vision   Wears glasses    Heme   Not taking aspirin    Other:  Albuterol proair hfa/proventil hfa/ventolin hfa 108 (90 base) mcg/act inhaler - not using  Diphenhydramine benadryl 25mg as needed  Epinephrine epipen  Loratadine claritin 10mg  Mometasone nasonex 50 mcg/act nasal spray    Hydrocortisone 2.5% cream    Eczema in ears  Prednisoline acetate pred forte 1% ophthalmic solution    Severe nut and fish allergies    Has some left leg sciatica    Medications            Albuterol proair hfa/proventil hfa/ventolin hfa 108 (90 base) mcg/act inhaler As needed       Conjugate estrogens premarin 0.625 mg/gm vaginal cream Not using now       Diphenhydramine benadryl 25mg  As needed       Epinephrine epipen        Hydrocortisone 2.5% cream As needed       Loratadine claritin 10mg  As needed        Mometasone nasonex 50 mcg/act nasal spray As needed       Prednisoline acetate pred forte 1% ophthalmic solution As needed  For eczema in ears                                                                             Plan:    Walks normally with  Good arm swing and normal postural stability    She has a no no tremor - cannot exclude a dystonic tremor - she has some elevation of the left shoulder  And has some pain with left head rotation or at least slightly harder to rotate to the left. \    She has had some therapy for her shoulder  - fairly long ago    She has not had traction or neck therapy.     Discussed that we probably dont need additional testing of her neck at this point and discussed that medications are not merited right now for the tremor. Discussed botox for the neck and she does not have substantial pain.    Monitor her neck to see if it increases in severity and if it gets jerkier in quality or has more of a tilt to it may suggest a dystonic tremor as opposed to head tremor from essential tremor.     There is no family history of tremor and she is not aware of her tremor to any degree.         Coding statement:   Medical Decision Making:  #  Chronic progressive medical conditions addressed  no  Review and/or interpretation of unique test or documentation from a provider outside of neurology no   Independent historian provided additional details  no  Prescription drug management and review of potential side effects and/or monitoring for side effects  no   Health impacted by social determinants of health  no    I have reviewed the note as documented above.  This accurately captures the substance of my conversation with the patient and total time spent preparing for visit, executing visit and completing visit on the day of the visit:  45 minutes.      Reggie Lorenz MD     ______________________________________    Last visit date and details:              ______________________________________      Patient was asked about 14 Review of systems including changes in vision (dry eyes, double vision), hearing, heart, lungs, musculoskeletal, depression, anxiety, snoring, RBD, insomnia, urinary frequency, urinary urgency, constipation, swallowing problems, hematological, ID, allergies, skin problems: seborrhea, endocrinological: thyroid, diabetes, cholesterol; balance, weight changes, and other neurological problems and these were not significant at this time except for   Patient Active Problem List   Diagnosis     CARDIOVASCULAR SCREENING; LDL GOAL LESS THAN 160     Lactose intolerance     Seasonal allergic rhinitis     Allergy, food     Advanced directives, counseling/discussion     Subclinical hypothyroidism     Osteopenia     Hollenhorst plaque, left eye     Family history of breast cancer     Chronic eczematous otitis externa of both ears          Allergies   Allergen Reactions     Aloe Vera      Fish      Fluticasone      Ibuprofen Sodium      Macrodantin [Nitrofuran Derivatives]      Mineral Oil      Nuts      Seafood      Shellfish Allergy      Sulfa Drugs      Yellow Dye      Past Surgical History:   Procedure Laterality Date     C TOTAL ABDOM HYSTERECTOMY      with BSO      SECTION  ,      COLONOSCOPY  2012    Procedure: COLONOSCOPY;  COLONOSCOPY, ABDOMINAL PAIN;  Surgeon: Maco Mascorro MD;  Location: [facility] OR     HC EXCISION BREAST LESION, OPEN >=1      LT Benign     HYSTERECTOMY, PAP NO LONGER INDICATED      JOSAFAT with BSO- benign bilateral serous cystadenomas     LAPAROSCOPY      ov cystectomy     Past Medical History:   Diagnosis Date     Gastritis      IBS (irritable bowel syndrome)      Infertility, female     had IVF     Lactose intolerance      Osteopenia 2011     Ovarian cyst     bilateral serous cystadenomas     Recurrent UTI      Seasonal allergic rhinitis      Social History      Socioeconomic History     Marital status:      Spouse name: Bruno     Number of children: 2     Years of education: Not on file     Highest education level: Not on file   Occupational History     Occupation:  shop      Employer: [employer]     Occupation: [employer]   Social Needs     Financial resource strain: Not on file     Food insecurity     Worry: Not on file     Inability: Not on file     Transportation needs     Medical: Not on file     Non-medical: Not on file   Tobacco Use     Smoking status: Never Smoker     Smokeless tobacco: Never Used   Substance and Sexual Activity     Alcohol use: Yes     Comment: occassionally     Drug use: No     Sexual activity: Yes     Partners: Male   Lifestyle     Physical activity     Days per week: Not on file     Minutes per session: Not on file     Stress: Not on file   Relationships     Social connections     Talks on phone: Not on file     Gets together: Not on file     Attends [employer] service: Not on file     Active member of club or organization: Not on file     Attends meetings of clubs or organizations: Not on file     Relationship status: Not on file     Intimate partner violence     Fear of current or ex partner: Not on file     Emotionally abused: Not on file     Physically abused: Not on file     Forced sexual activity: Not on file   Other Topics Concern     Parent/sibling w/ CABG, MI or angioplasty before 65F 55M? No   Social History Narrative     Not on file       Drug and lactation database from the [place] National Library of Medicine:  http://toxnet.nlm.nih.gov/cgi-bin/sis/htmlgen?LACT      B/P: Data Unavailable, T: Data Unavailable, P: Data Unavailable, R: Data Unavailable 0 lbs 0 oz  not currently breastfeeding., There is no height or weight on file to calculate BMI.  Medications and Vitals not listed above were documented in the cart and reviewed by me.     Current Outpatient Medications   Medication Sig Dispense  Refill     BENADRYL ALLERGY OR AS NEEDED FOR ALLERGIES       CLARITIN OR AS NEEDED FOR ALLERGIES       conjugated estrogens (PREMARIN) 0.625 MG/GM vaginal cream Place 1 g vaginally twice a week 30 g 3     EPINEPHrine (ANY BX GENERIC EQUIV) 0.3 MG/0.3ML injection 2-pack Inject 0.3 mLs (0.3 mg) into the muscle as needed for anaphylaxis (or exposure to trigger) 0.6 mL 1     hydrocortisone 2.5 % cream Apply sparingly to affected area 2 times daily for no more than 14 days 30 g 0     mometasone (NASONEX) 50 MCG/ACT nasal spray Spray 2 sprays into both nostrils daily for allergy prevention. 1 Box 11     prednisoLONE acetate (PRED FORTE) 1 % ophthalmic suspension 4 drops into both ears twice weekly 1 Bottle 5     VENTOLIN  (90 Base) MCG/ACT inhaler   0         Reggie Lorenz MD

## 2021-05-11 RX ORDER — ALBUTEROL SULFATE 90 UG/1
2 AEROSOL, METERED RESPIRATORY (INHALATION) EVERY 6 HOURS PRN
COMMUNITY
End: 2022-05-17

## 2021-05-11 RX ORDER — LORATADINE 10 MG/1
10 TABLET ORAL DAILY PRN
COMMUNITY
End: 2023-05-22

## 2021-05-11 RX ORDER — PREDNISOLONE ACETATE 10 MG/ML
SUSPENSION/ DROPS OPHTHALMIC
COMMUNITY
Start: 2021-05-11 | End: 2021-05-17

## 2021-05-11 RX ORDER — DIPHENHYDRAMINE HCL 25 MG
25 TABLET ORAL EVERY 6 HOURS PRN
COMMUNITY
End: 2023-05-22

## 2021-05-17 ENCOUNTER — OFFICE VISIT (OUTPATIENT)
Dept: NEUROLOGY | Facility: CLINIC | Age: 66
End: 2021-05-17
Attending: FAMILY MEDICINE
Payer: MEDICARE

## 2021-05-17 VITALS
HEART RATE: 68 BPM | OXYGEN SATURATION: 100 % | BODY MASS INDEX: 23.17 KG/M2 | SYSTOLIC BLOOD PRESSURE: 142 MMHG | RESPIRATION RATE: 12 BRPM | DIASTOLIC BLOOD PRESSURE: 84 MMHG | WEIGHT: 135 LBS

## 2021-05-17 DIAGNOSIS — Z78.0 ASYMPTOMATIC MENOPAUSAL STATE: ICD-10-CM

## 2021-05-17 DIAGNOSIS — R25.9 ABNORMAL INVOLUNTARY MOVEMENT: Primary | ICD-10-CM

## 2021-05-17 DIAGNOSIS — H60.8X3 CHRONIC ECZEMATOUS OTITIS EXTERNA OF BOTH EARS: ICD-10-CM

## 2021-05-17 PROCEDURE — 99204 OFFICE O/P NEW MOD 45 MIN: CPT | Performed by: PSYCHIATRY & NEUROLOGY

## 2021-05-17 RX ORDER — PREDNISOLONE ACETATE 10 MG/ML
SUSPENSION/ DROPS OPHTHALMIC
COMMUNITY
Start: 2021-05-17 | End: 2021-06-14

## 2021-05-17 NOTE — PATIENT INSTRUCTIONS
Right handed  No tremor noticed   (R25.9) Abnormal involuntary movement  (primary encounter diagnosis)  Neck issues  Head possibly shaking  Seen possibly neurology at [place]    No history of tremor, viv disease or parkinson in her family.    Gait/Balance/Falls   No fall in past year    Exercise/Therapy   Walks, bikes, in  yard    Cognitive/Driving   No cognitive changes    Mood   No mood problems    Hallucinations/delusions   no    Sleep   Goes to bed around 10pm  Wakes up at 6/7am  May be mild in snoring  No dream enactment behavior    No loss of smell    Bladder   No problems    GI/Constipation/GERD  Lactose intolerance  Gastritis  IBS - denies this     ENDO   Osteopenia  Gestational diabetes  Conjugate estrogens premarin 0.625 mg/gm vaginal cream      Cardio/heart   denies    Vision   Wears glasses    Heme   Not taking aspirin    Other:  Albuterol proair hfa/proventil hfa/ventolin hfa 108 (90 base) mcg/act inhaler - not using  Diphenhydramine benadryl 25mg as needed  Epinephrine epipen  Loratadine claritin 10mg  Mometasone nasonex 50 mcg/act nasal spray    Hydrocortisone 2.5% cream    Eczema in ears  Prednisoline acetate pred forte 1% ophthalmic solution    Severe nut and fish allergies    Has some left leg sciatica    Medications            Albuterol proair hfa/proventil hfa/ventolin hfa 108 (90 base) mcg/act inhaler As needed       Conjugate estrogens premarin 0.625 mg/gm vaginal cream Not using now       Diphenhydramine benadryl 25mg  As needed       Epinephrine epipen        Hydrocortisone 2.5% cream As needed       Loratadine claritin 10mg  As needed       Mometasone nasonex 50 mcg/act nasal spray As needed       Prednisoline acetate pred forte 1% ophthalmic solution As needed  For eczema in ears                                                                             Plan:    Walks normally with  Good arm swing and normal postural stability    She has a no no tremor - cannot exclude a  dystonic tremor - she has some elevation of the left shoulder  And has some pain with left head rotation or at least slightly harder to rotate to the left. \    She has had some therapy for her shoulder  - fairly long ago    She has not had traction or neck therapy.     Discussed that we probably dont need additional testing of her neck at this point and discussed that medications are not merited right now for the tremor. Discussed botox for the neck and she does not have substantial pain.    Monitor her neck to see if it increases in severity and if it gets jerkier in quality or has more of a tilt to it may suggest a dystonic tremor as opposed to head tremor from essential tremor.     There is no family history of tremor and she is not aware of her tremor to any degree.

## 2021-05-17 NOTE — LETTER
2021         RE: Jhoana Hernandez  [address] [place] [address]        Dear Colleague,    Thank you for referring your patient, Jhoana Hernandez, to the [facility] [place]. Please see a copy of my visit note below.        Diagnosis/Summary/Recommendations:    PATIENT: Jhoana Hernandez  66 year old female     : 1955    GIOVANA: May 17, 2021    [address] [place] [id number] [phone] ()   [phone] ()   [email or web address]  Bruno Hernandez  [phone]    2 kids    Assessment:  Right handed  No tremor noticed   (R25.9) Abnormal involuntary movement  (primary encounter diagnosis)  Neck issues  Head possibly shaking  Seen possibly neurology at [place]    No history of tremor, viv disease or parkinson in her family.    Gait/Balance/Falls   No fall in past year    Exercise/Therapy   Walks, bikes, in  yard    Cognitive/Driving   No cognitive changes    Mood   No mood problems    Hallucinations/delusions   no    Sleep   Goes to bed around 10pm  Wakes up at 6/7am  May be mild in snoring  No dream enactment behavior    No loss of smell    Bladder   No problems    GI/Constipation/GERD  Lactose intolerance  Gastritis  IBS - denies this     ENDO   Osteopenia  Gestational diabetes  Conjugate estrogens premarin 0.625 mg/gm vaginal cream  t4 normal, t3 normal, tpo normal  tsh slightly up      Cardio/heart   denies    Vision   Wears glasses    Heme   Not taking aspirin    Other:  Albuterol proair hfa/proventil hfa/ventolin hfa 108 (90 base) mcg/act inhaler - not using  Diphenhydramine benadryl 25mg as needed  Epinephrine epipen  Loratadine claritin 10mg  Mometasone nasonex 50 mcg/act nasal spray    Hydrocortisone 2.5% cream    Eczema in ears  Prednisoline acetate pred forte 1% ophthalmic solution    Severe nut and fish allergies    Has some left leg sciatica    Medications            Albuterol proair hfa/proventil  hfa/ventolin hfa 108 (90 base) mcg/act inhaler As needed       Conjugate estrogens premarin 0.625 mg/gm vaginal cream Not using now       Diphenhydramine benadryl 25mg  As needed       Epinephrine epipen        Hydrocortisone 2.5% cream As needed       Loratadine claritin 10mg  As needed       Mometasone nasonex 50 mcg/act nasal spray As needed       Prednisoline acetate pred forte 1% ophthalmic solution As needed  For eczema in ears                                                                             Plan:    Walks normally with  Good arm swing and normal postural stability    She has a no no tremor - cannot exclude a dystonic tremor - she has some elevation of the left shoulder  And has some pain with left head rotation or at least slightly harder to rotate to the left. \    She has had some therapy for her shoulder  - fairly long ago    She has not had traction or neck therapy.     Discussed that we probably dont need additional testing of her neck at this point and discussed that medications are not merited right now for the tremor. Discussed botox for the neck and she does not have substantial pain.    Monitor her neck to see if it increases in severity and if it gets jerkier in quality or has more of a tilt to it may suggest a dystonic tremor as opposed to head tremor from essential tremor.     There is no family history of tremor and she is not aware of her tremor to any degree.         Coding statement:   Medical Decision Making:  #  Chronic progressive medical conditions addressed  no  Review and/or interpretation of unique test or documentation from a provider outside of neurology no   Independent historian provided additional details  no  Prescription drug management and review of potential side effects and/or monitoring for side effects  no   Health impacted by social determinants of health  no    I have reviewed the note as documented above.  This accurately captures the substance of my  conversation with the patient and total time spent preparing for visit, executing visit and completing visit on the day of the visit:  45 minutes.      Reggie Lorenz MD     ______________________________________    Last visit date and details:             ______________________________________      Patient was asked about 14 Review of systems including changes in vision (dry eyes, double vision), hearing, heart, lungs, musculoskeletal, depression, anxiety, snoring, RBD, insomnia, urinary frequency, urinary urgency, constipation, swallowing problems, hematological, ID, allergies, skin problems: seborrhea, endocrinological: thyroid, diabetes, cholesterol; balance, weight changes, and other neurological problems and these were not significant at this time except for   Patient Active Problem List   Diagnosis     CARDIOVASCULAR SCREENING; LDL GOAL LESS THAN 160     Lactose intolerance     Seasonal allergic rhinitis     Allergy, food     Advanced directives, counseling/discussion     Subclinical hypothyroidism     Osteopenia     Hollenhorst plaque, left eye     Family history of breast cancer     Chronic eczematous otitis externa of both ears          Allergies   Allergen Reactions     Aloe Vera      Fish      Fluticasone      Ibuprofen Sodium      Macrodantin [Nitrofuran Derivatives]      Mineral Oil      Nuts      Seafood      Shellfish Allergy      Sulfa Drugs      Yellow Dye      Past Surgical History:   Procedure Laterality Date     C TOTAL ABDOM HYSTERECTOMY      with BSO      SECTION  ,      COLONOSCOPY  2012    Procedure: COLONOSCOPY;  COLONOSCOPY, ABDOMINAL PAIN;  Surgeon: Maco Mascorro MD;  Location: [facility] OR     HC EXCISION BREAST LESION, OPEN >=1      LT Benign     HYSTERECTOMY, PAP NO LONGER INDICATED      JOSAFAT with BSO- benign bilateral serous cystadenomas     LAPAROSCOPY      ov cystectomy     Past Medical History:   Diagnosis Date     Gastritis      IBS (irritable  bowel syndrome)      Infertility, female 1989    had IVF     Lactose intolerance      Osteopenia 11/4/2011     Ovarian cyst 2011    bilateral serous cystadenomas     Recurrent UTI      Seasonal allergic rhinitis      Social History     Socioeconomic History     Marital status:      Spouse name: Bruno     Number of children: 2     Years of education: Not on file     Highest education level: Not on file   Occupational History     Occupation:  shop      Employer: [employer]     Occupation: [employer]   Social Needs     Financial resource strain: Not on file     Food insecurity     Worry: Not on file     Inability: Not on file     Transportation needs     Medical: Not on file     Non-medical: Not on file   Tobacco Use     Smoking status: Never Smoker     Smokeless tobacco: Never Used   Substance and Sexual Activity     Alcohol use: Yes     Comment: occassionally     Drug use: No     Sexual activity: Yes     Partners: Male   Lifestyle     Physical activity     Days per week: Not on file     Minutes per session: Not on file     Stress: Not on file   Relationships     Social connections     Talks on phone: Not on file     Gets together: Not on file     Attends [employer]ism service: Not on file     Active member of club or organization: Not on file     Attends meetings of clubs or organizations: Not on file     Relationship status: Not on file     Intimate partner violence     Fear of current or ex partner: Not on file     Emotionally abused: Not on file     Physically abused: Not on file     Forced sexual activity: Not on file   Other Topics Concern     Parent/sibling w/ CABG, MI or angioplasty before 65F 55M? No   Social History Narrative     Not on file       Drug and lactation database from the [place] National Library of Medicine:  http://toxnet.nlm.nih.gov/cgi-bin/sis/htmlgen?LACT      B/P: Data Unavailable, T: Data Unavailable, P: Data Unavailable, R: Data Unavailable 0 lbs 0 oz  not  currently breastfeeding., There is no height or weight on file to calculate BMI.  Medications and Vitals not listed above were documented in the cart and reviewed by me.     Current Outpatient Medications   Medication Sig Dispense Refill     BENADRYL ALLERGY OR AS NEEDED FOR ALLERGIES       CLARITIN OR AS NEEDED FOR ALLERGIES       conjugated estrogens (PREMARIN) 0.625 MG/GM vaginal cream Place 1 g vaginally twice a week 30 g 3     EPINEPHrine (ANY BX GENERIC EQUIV) 0.3 MG/0.3ML injection 2-pack Inject 0.3 mLs (0.3 mg) into the muscle as needed for anaphylaxis (or exposure to trigger) 0.6 mL 1     hydrocortisone 2.5 % cream Apply sparingly to affected area 2 times daily for no more than 14 days 30 g 0     mometasone (NASONEX) 50 MCG/ACT nasal spray Spray 2 sprays into both nostrils daily for allergy prevention. 1 Box 11     prednisoLONE acetate (PRED FORTE) 1 % ophthalmic suspension 4 drops into both ears twice weekly 1 Bottle 5     VENTOLIN  (90 Base) MCG/ACT inhaler   0         Reggie Lorenz MD        Again, thank you for allowing me to participate in the care of your patient.        Sincerely,        Reggie Lorenz MD

## 2021-06-10 ENCOUNTER — OFFICE VISIT (OUTPATIENT)
Dept: FAMILY MEDICINE | Facility: CLINIC | Age: 66
End: 2021-06-10
Payer: MEDICARE

## 2021-06-10 VITALS
BODY MASS INDEX: 23.05 KG/M2 | SYSTOLIC BLOOD PRESSURE: 136 MMHG | DIASTOLIC BLOOD PRESSURE: 78 MMHG | WEIGHT: 135 LBS | HEIGHT: 64 IN | OXYGEN SATURATION: 99 % | HEART RATE: 77 BPM

## 2021-06-10 DIAGNOSIS — H61.23 BILATERAL IMPACTED CERUMEN: Primary | ICD-10-CM

## 2021-06-10 DIAGNOSIS — J30.1 SEASONAL ALLERGIC RHINITIS DUE TO POLLEN: ICD-10-CM

## 2021-06-10 PROCEDURE — 99213 OFFICE O/P EST LOW 20 MIN: CPT | Performed by: FAMILY MEDICINE

## 2021-06-10 ASSESSMENT — PAIN SCALES - GENERAL: PAINLEVEL: NO PAIN (0)

## 2021-06-10 ASSESSMENT — MIFFLIN-ST. JEOR: SCORE: 1132.36

## 2021-06-10 NOTE — NURSING NOTE
Patient identified using two patient identifiers.  Ear exam showing wax occlusion completed by provider.  H202/H20 was placed in the bilateral ear(s) via irrigation tool: elephant ear.        Allyson Prasad MA

## 2021-06-10 NOTE — PATIENT INSTRUCTIONS
At [facility], we strive to deliver an exceptional experience to you, every time we see you. If you receive a survey, please complete it as we do value your feedback.  If you have MyChart, you can expect to receive results automatically within 24 hours of their completion.  Your provider will send a note interpreting your results as well.   If you do not have MyChart, you should receive your results in about a week by mail.    Your care team:                            Family Medicine Internal Medicine   MD Eric Cheema MD Shantel Branch-Fleming, MD Srinivasa Vaka, MD Katya Belousova, PAGISSELL Penaloza, APRN CNP    Marcus Stevenson, MD Pediatrics   Stephen Escobar, PAGISSELL Sheffield, CNP MD Nafisa Wong APRN CNP   MD Lupis Maddox MD Deborah Mielke, MD Arielle Bae, APRN [facility]      Clinic hours: Monday - Thursday 7 am-6 pm; Fridays 7 am-5 pm.   Urgent care: Monday - Friday 10 am- 8 pm; Saturday and Sunday 9 am-5 pm.    Clinic: [phone]       [facility]: Monday - Thursday 8 am - 7 pm; Friday 8 am - 6 pm  [facility]: [phone]     Use [email or web address] for 24/7 diagnosis and treatment of dozens of conditions.    Patient Education       Earwax, Home Treatment    Everyone produces earwax from the lining of the ear canal. It serves to lubricate and protect the ear. The wax that forms in the canal naturally moves toward the outside of the ear and falls out. Sometimes the ear canal may contain too much wax. This can cause a blockage and loss of hearing. Directions are given below for home treatment.  Home care  If your doctor has advised you to remove a wax blockage yourself, follow these directions:    Unless a medicine was prescribed, you may use an over-the-counter product made for clearing earwax. These contain carbamide peroxide. Lie down with the blocked ear facing  upward. Apply one dropper full of medicine and wait a few minutes. Grasp the outer ear and wiggle it to help the solution enter the canal.    Lean over a sink or basin with the blocked ear facing downward. Use a bulb syringe filled with warm (not hot or cold) water to rinse the ear several times. Use gentle pressure only.    If you are having trouble draining the water out of your ear canal, put a few drops of rubbing alcohol (isopropyl alcohol) into the ear canal. This will help remove the remaining water.    Repeat this procedure once a day for up to three days, or until your hearing is back to normal. Do not use this treatment for more than three days in a row.  Don ts    Don t use cold water to rinse the ear. This will make you dizzy.    Don t perform this procedure if you have an ear infection.    Don t perform this procedure if you have a ruptured eardrum.    Don t use cotton swabs, matches, hairpins, keys, or other objects to  clean  the ear canal. This can cause infection of the ear canal or rupture the eardrum. Because of their size and shape, cotton swabs can push earwax deeper into the ear canal instead of removing it.  Follow-up care  Follow up with your health care provider if you are not improving after three cleaning attempts, or as advised.  When to seek medical advice  Call your health care provider right away if any of these occur:    Worsening ear pain    Fever of 101 F (38.3 C) or higher, or as directed by your health care provider    Hearing does not return to normal after three days of treatment    Fluid drainage or bleeding from the ear canal    Swelling, redness, or tenderness of the outer ear    Headache, neck pain, or stiff neck    7398-5005 [employer]. [address] [place] [address]. All rights reserved. This information is not intended as a substitute for professional medical care. Always follow your healthcare professional's instructions.

## 2021-06-10 NOTE — PROGRESS NOTES
Assessment & Plan     Bilateral impacted cerumen  Irrigated in clinic by MA. TM's still difficult to visualize after irrigation. Follow up with ENT if ears still feel blocked.     Seasonal allergic rhinitis due to pollen  Recommend twice a day antihistamine during allergy season as needed to relieve symptoms. Avoid diphenhydramine.                CONSULTATION/REFERRAL to ENT  FUTURE APPOINTMENTS:       - Follow-up visit in 3 months with primary care provider or as needed.   See Patient Instructions    Return in about 3 months (around 9/10/2021) for Follow up.    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   YES- Wax    Previous history of similar problem:   YES      Location:           Left & Right    Describe it:   Plugged    Severity: moderate      Progression of symptoms from onset until now:  worsening      Accompanying Signs & Symptoms:  Ears plugged with wax    What have you noticed that tends to make this worse?     None      What have you noticed that tends to make this better?     None      What have you done (this time) to try to treat this problem yourself?     None      Did it help?     no             Allergies  Onset/Duration: biannual and has been more symptomatic for past month  Symptoms:   Nasal congestion: YES  Sneezing: YES  Red, itchy eyes: YES  Progression of Symptoms: same waxing and waning  Accompanying Signs & Symptoms:  Cough: no  Wheezing: no  Rash: no  Sinus/facial pain: no  History:   Is it seasonal: in the fall and in the spring   History of Asthma: no  Has allergy testing been done: no  Precipitating factors:   None  Alleviating factors:  Sinus steroids and antihistamines  Therapies tried and outcome: above somewhat helpful but not relieving all  "the symptoms.         Review of Systems   Constitutional, HEENT, cardiovascular, pulmonary, gi and gu systems are negative, except as otherwise noted.      Objective    /78   Pulse 77   Ht 1.626 m (5' 4\")   Wt 61.2 kg (135 lb)   SpO2 99%   BMI 23.17 kg/m    Body mass index is 23.17 kg/m .  Physical Exam   GENERAL APPEARANCE: healthy, alert and no distress  EYES: Eyes grossly normal to inspection, PERRL and conjunctivae and sclerae normal  HENT: ear canals blocked with cerumen and narrow, and TM's difficult to visualize, nose and mouth without ulcers or lesions, oropharynx clear and oral mucous membranes moist  NECK: no adenopathy, no asymmetry, masses, or scars and thyroid normal to palpation  RESP: lungs clear to auscultation - no rales, rhonchi or wheezes  CV: regular rates and rhythm, normal S1 S2, no S3 or S4, no murmur, click or rub, no peripheral edema and peripheral pulses strong  ABDOMEN: soft, nontender, no hepatosplenomegaly, no masses and bowel sounds normal  MS: no musculoskeletal defects are noted and gait is age appropriate without ataxia  SKIN: no suspicious lesions or rashes  NEURO: Normal strength and tone, sensory exam grossly normal, mentation intact and speech normal  PSYCH: mentation appears normal and affect normal/bright     Office Visit on 04/09/2021   Component Date Value Ref Range Status     Thyroid Peroxidase Antibody 04/09/2021 23  <35 IU/mL Final     Triiodothyronine (T3) 04/09/2021 100  60 - 181 ng/dL Final     TSH 04/09/2021 4.83* 0.40 - 4.00 mU/L Final     Cholesterol 04/09/2021 255* <200 mg/dL Final    Desirable:       <200 mg/dl     Triglycerides 04/09/2021 108  <150 mg/dL Final     HDL Cholesterol 04/09/2021 85  >49 mg/dL Final     LDL Cholesterol Calculated 04/09/2021 148* <100 mg/dL Final    Comment: Above desirable:  100-129 mg/dl  Borderline High:  130-159 mg/dL  High:             160-189 mg/dL  Very high:       >189 mg/dl       Non HDL Cholesterol 04/09/2021 170* " <130 mg/dL Final    Comment: Above Desirable:  130-159 mg/dl  Borderline high:  160-189 mg/dl  High:             190-219 mg/dl  Very high:       >219 mg/dl       Glucose 04/09/2021 109* 70 - 99 mg/dL Final     T4 Free 04/09/2021 0.85  0.76 - 1.46 ng/dL Final

## 2021-06-10 NOTE — PROGRESS NOTES
History of Present Illness - Jhoana Hernandez is a 66 year old female last seen on 5/17/2020    To review, for many years she has noted a sense of fullness and itching in the ears. She has tried various OTC drops before, but nothing seems to help.  She has never had ear surgery or chronic ear disease.  However, she notes that there seems to be a routine event every fall and spring where her ears seem more stuffy and full.  On exam, after debriding out the canals, her exam strongly suggested chronic eczematous otitis externa.  I would have liked to have used dermotic, but she has a severe anaphylactic nut allergy and therefore I had her use more home treatments like olive oil.  She tells me that it has seemed to help.  Her ears will get stuffy at times.    Also, she had complaints of some dull aching and fullness around the ears that would come and go.  She wears invisalign braces, and felt that this was temporomandibular disease.  I gave her a home therapy sheet.  And at the 9/30/13 visit, this problem had settled down just fine.  She had returned 4/12/16 due to a recurrence of ear pain.  This started about a month prior and she was diagnosed with otitis media and sinusitis.  Antibiotics were given, and things improved.    With regards to her temporomandibular disease, she does tell me that she notes that when she skips wearing her invisalign brace overnight the ear and facial aches do get better.    She has returned primarily to have her ears checked because of fullness since she ran out of prednisolone drops for the ears, more in the LEFT ear.    Also, she wanted to discuss her seasonal allergies, which are much worse this year.  She takes daily claritin, flonase, and even benadryl when things are really bad.  She is still having post nasal drainage, congestion.    Past Medical History -   Patient Active Problem List   Diagnosis     CARDIOVASCULAR SCREENING; LDL GOAL LESS THAN 160     Lactose intolerance      Seasonal allergic rhinitis     Allergy, food     Advanced directives, counseling/discussion     Subclinical hypothyroidism     Osteopenia     Hollenhorst plaque, left eye     Family history of breast cancer     Chronic eczematous otitis externa of both ears       Current Medications -   Current Outpatient Medications:      albuterol (PROAIR HFA/PROVENTIL HFA/VENTOLIN HFA) 108 (90 Base) MCG/ACT inhaler, Inhale 2 puffs into the lungs every 6 hours as needed for shortness of breath / dyspnea or wheezing, Disp: , Rfl:      conjugated estrogens (PREMARIN) 0.625 MG/GM vaginal cream, Place 1 g vaginally twice a week As needed, Disp: 30 g, Rfl: 3     diphenhydrAMINE (BENADRYL) 25 MG tablet, Take 25 mg by mouth every 6 hours as needed for itching or allergies, Disp: , Rfl:      EPINEPHrine (ANY BX GENERIC EQUIV) 0.3 MG/0.3ML injection 2-pack, Inject 0.3 mLs (0.3 mg) into the muscle as needed for anaphylaxis (or exposure to trigger), Disp: 0.6 mL, Rfl: 1     hydrocortisone 2.5 % cream, Apply sparingly to affected area 2 times daily for no more than 14 days, Disp: 30 g, Rfl: 0     loratadine (CLARITIN) 10 MG tablet, Take 10 mg by mouth daily as needed for allergies, Disp: , Rfl:      mometasone (NASONEX) 50 MCG/ACT nasal spray, Spray 2 sprays into both nostrils daily for allergy prevention., Disp: 1 Box, Rfl: 11     prednisoLONE acetate (PRED FORTE) 1 % ophthalmic suspension, 4 drops into both ears twice as needed, Disp:  , Rfl:     Allergies -   Allergies   Allergen Reactions     Docosahexaenoic Acid-Epa Anaphylaxis     Fish Anaphylaxis     Nuts Anaphylaxis     Shellfish Allergy Anaphylaxis     Aloe Hives     Aloe Vera      Coconut Oil Other (See Comments)     Fish      Fluticasone      Food Hives     Hydrogenated Palm Oil Glycerides Other (See Comments)     Ibuprofen Sodium      Macrodantin [Nitrofuran Derivatives]      Mineral Oil Itching     Peanut (Diagnostic)      Seafood      Sulfa Drugs      Yellow Dye      Yellow Dyes  "Itching     Ibuprofen Other (See Comments)     Jittery, Chest pain, SOB, Congestion, Dizziness       Social History -   History     Social History     Marital Status:      Spouse Name: Bruno     Number of Children: 2     Years of Education: N/A     Occupational History      shop       [employer]      Social History Main Topics     Smoking status: Never Smoker      Smokeless tobacco: Never Used     Alcohol Use: Yes      Comment: occassionally     Drug Use: No     Sexual Activity:     Partners: Male     Other Topics Concern     Parent/Sibling W/ Cabg, Mi Or Angioplasty Before 65f 55m? No     Social History Narrative       Family History -   Family History   Problem Relation Age of Onset     Asthma Sister      Thyroid Disease Sister      Lipids Mother      Food Allergy Mother      Thyroid Disease Mother      Cerebrovascular Disease Father      Asthma Father      Thyroid Disease Brother      Diabetes Type 2  Sister      Breast Cancer Sister 60     Thyroid Disease Sister      Graves' disease Sister      Thyroid Disease Sister      Other - See Comments Son      Other - See Comments Son      C.A.D. No family hx of      Hypertension No family hx of      Cancer - colorectal No family hx of      Prostate Cancer No family hx of        Review of Systems - As per HPI and PMHx, otherwise 7 system review of the head and neck negative.    Physical Exam  Resp 16   Ht 1.626 m (5' 4\")   Wt 61.2 kg (135 lb)   SpO2 (!) 80%   BMI 23.17 kg/m      General - The patient is well nourished and well developed, and appears to have good nutritional status.  Alert and oriented to person and place, answers questions and cooperates with examination appropriately.   Head and Face - Normocephalic and atraumatic, with no gross asymmetry noted of the contour of the facial features.  The facial nerve is intact, with strong symmetric movements.  Voice and Breathing - The patient was breathing comfortably without the use of " accessory muscles. There was no wheezing, stridor, or stertor.  The patients voice was clear and strong, and had appropriate pitch and quality.  Ears - the LEFT canal was densely obstructed with thick pasty yellow cerumen.  Using procedural otoscope, alligator, and suction, I debrided the LEFT canal.  The RIGHT canal also had a lot of smei moist yellow cerumen and dead skin that I suctioned away under the binocular microscope. The tympanic membranes are normal in appearance, bony landmarks are intact.  No retraction, perforation, or masses.  Normal mobility on valsalva maneuver, with no reports of dizziness on insuflation.  No fluid or purulence was seen in the external canal or the middle ear. No evidence of infection of the middle ear or external canal, cerumen was normal in appearance.  Eyes - Extraocular movements intact, and the pupils were reactive to light.  Sclera were not icteric or injected, conjunctiva were pink and moist.  Mouth - Examination of the oral cavity showed pink, healthy oral mucosa. No lesions or ulcerations noted.  The tongue was mobile and midline, and the dentition were in good condition.          A/P - Jhoana Hernandez is a 66 year old female  (H60.8X3) Chronic eczematous otitis externa of both ears  (primary encounter diagnosis)  (H61.23) Bilateral impacted cerumen    Overall, Jhoana's health seems great.  Her ears are cleared now, and I have renewed the prednisolone drops to help suppress the chronic eczematous otitis externa that creates her fast build up in the canals.    Her allergic rhinitis is under good control, and I have renewed her Nasonex and Ophthalmic prednisolone fo rthe ear canals.    Follow up in one year, sooner if there are any new issues.

## 2021-06-14 ENCOUNTER — TELEPHONE (OUTPATIENT)
Dept: OTOLARYNGOLOGY | Facility: CLINIC | Age: 66
End: 2021-06-14

## 2021-06-14 ENCOUNTER — OFFICE VISIT (OUTPATIENT)
Dept: OTOLARYNGOLOGY | Facility: CLINIC | Age: 66
End: 2021-06-14
Payer: MEDICARE

## 2021-06-14 VITALS
DIASTOLIC BLOOD PRESSURE: 76 MMHG | BODY MASS INDEX: 23.05 KG/M2 | HEART RATE: 80 BPM | SYSTOLIC BLOOD PRESSURE: 146 MMHG | OXYGEN SATURATION: 97 % | RESPIRATION RATE: 16 BRPM | HEIGHT: 64 IN | WEIGHT: 135 LBS

## 2021-06-14 DIAGNOSIS — H60.8X3 CHRONIC ECZEMATOUS OTITIS EXTERNA OF BOTH EARS: Primary | ICD-10-CM

## 2021-06-14 DIAGNOSIS — J30.1 CHRONIC SEASONAL ALLERGIC RHINITIS DUE TO POLLEN: ICD-10-CM

## 2021-06-14 DIAGNOSIS — H61.23 BILATERAL IMPACTED CERUMEN: ICD-10-CM

## 2021-06-14 PROCEDURE — 99214 OFFICE O/P EST MOD 30 MIN: CPT | Performed by: OTOLARYNGOLOGY

## 2021-06-14 RX ORDER — MOMETASONE FUROATE MONOHYDRATE 50 UG/1
2 SPRAY, METERED NASAL DAILY
Qty: 17 G | Refills: 11 | Status: SHIPPED | OUTPATIENT
Start: 2021-06-14

## 2021-06-14 RX ORDER — PREDNISOLONE ACETATE 10 MG/ML
SUSPENSION/ DROPS OPHTHALMIC
Qty: 10 ML | Refills: 11 | Status: SHIPPED | OUTPATIENT
Start: 2021-06-14 | End: 2023-08-26

## 2021-06-14 RX ORDER — AZELASTINE HCL 205.5 UG/1
2 SPRAY NASAL 2 TIMES DAILY
Qty: 30 ML | Refills: 11 | Status: SHIPPED | OUTPATIENT
Start: 2021-06-14 | End: 2023-05-22

## 2021-06-14 ASSESSMENT — MIFFLIN-ST. JEOR: SCORE: 1132.36

## 2021-06-14 ASSESSMENT — PAIN SCALES - GENERAL: PAINLEVEL: NO PAIN (0)

## 2021-06-14 NOTE — NURSING NOTE
Jhoana to follow up with Primary Care provider regarding elevated blood pressure.  Magi Zarate MA

## 2021-06-14 NOTE — TELEPHONE ENCOUNTER
Per routing comment from Dr. Pepper:    Just once per day.  These are long term drops to suppress the eczema in her ear canals, and she knows that she is free to play around with the frequency as needed     DY

## 2021-06-14 NOTE — LETTER
6/14/2021         RE: Jhoana Hernandez  [address] [place] [address]        Dear Colleague,    [facility] Please see a copy of my visit note below.    History of Present Illness - Jhoana Hernandez is a 66 year old female last seen on 5/17/2020    To review, for many years she has noted a sense of fullness and itching in the ears. She has tried various OTC drops before, but nothing seems to help.  She has never had ear surgery or chronic ear disease.  However, she notes that there seems to be a routine event every fall and spring where her ears seem more stuffy and full.  On exam, after debriding out the canals, her exam strongly suggested chronic eczematous otitis externa.  I would have liked to have used dermotic, but she has a severe anaphylactic nut allergy and therefore I had her use more home treatments like olive oil.  She tells me that it has seemed to help.  Her ears will get stuffy at times.    Also, she had complaints of some dull aching and fullness around the ears that would come and go.  She wears invisalign braces, and felt that this was temporomandibular disease.  I gave her a home therapy sheet.  And at the 9/30/13 visit, this problem had settled down just fine.  She had returned 4/12/16 due to a recurrence of ear pain.  This started about a month prior and she was diagnosed with otitis media and sinusitis.  Antibiotics were given, and things improved.    With regards to her temporomandibular disease, she does tell me that she notes that when she skips wearing her invisalign brace overnight the ear and facial aches do get better.    She has returned primarily to have her ears checked because of fullness since she ran out of prednisolone drops for the ears, more in the LEFT ear.    Also, she wanted to discuss her seasonal allergies, which are much worse this year.  She takes daily  claritin, flonase, and even benadryl when things are really bad.  She is still having post nasal drainage, congestion.    Past Medical History -   Patient Active Problem List   Diagnosis     CARDIOVASCULAR SCREENING; LDL GOAL LESS THAN 160     Lactose intolerance     Seasonal allergic rhinitis     Allergy, food     Advanced directives, counseling/discussion     Subclinical hypothyroidism     Osteopenia     Hollenhorst plaque, left eye     Family history of breast cancer     Chronic eczematous otitis externa of both ears       Current Medications -   Current Outpatient Medications:      albuterol (PROAIR HFA/PROVENTIL HFA/VENTOLIN HFA) 108 (90 Base) MCG/ACT inhaler, Inhale 2 puffs into the lungs every 6 hours as needed for shortness of breath / dyspnea or wheezing, Disp: , Rfl:      conjugated estrogens (PREMARIN) 0.625 MG/GM vaginal cream, Place 1 g vaginally twice a week As needed, Disp: 30 g, Rfl: 3     diphenhydrAMINE (BENADRYL) 25 MG tablet, Take 25 mg by mouth every 6 hours as needed for itching or allergies, Disp: , Rfl:      EPINEPHrine (ANY BX GENERIC EQUIV) 0.3 MG/0.3ML injection 2-pack, Inject 0.3 mLs (0.3 mg) into the muscle as needed for anaphylaxis (or exposure to trigger), Disp: 0.6 mL, Rfl: 1     hydrocortisone 2.5 % cream, Apply sparingly to affected area 2 times daily for no more than 14 days, Disp: 30 g, Rfl: 0     loratadine (CLARITIN) 10 MG tablet, Take 10 mg by mouth daily as needed for allergies, Disp: , Rfl:      mometasone (NASONEX) 50 MCG/ACT nasal spray, Spray 2 sprays into both nostrils daily for allergy prevention., Disp: 1 Box, Rfl: 11     prednisoLONE acetate (PRED FORTE) 1 % ophthalmic suspension, 4 drops into both ears twice as needed, Disp:  , Rfl:     Allergies -   Allergies   Allergen Reactions     Docosahexaenoic Acid-Epa Anaphylaxis     Fish Anaphylaxis     Nuts Anaphylaxis     Shellfish Allergy Anaphylaxis     Aloe Hives     Aloe Vera      Coconut Oil Other (See Comments)      "Fish      Fluticasone      Food Hives     Hydrogenated Palm Oil Glycerides Other (See Comments)     Ibuprofen Sodium      Macrodantin [Nitrofuran Derivatives]      Mineral Oil Itching     Peanut (Diagnostic)      Seafood      Sulfa Drugs      Yellow Dye      Yellow Dyes Itching     Ibuprofen Other (See Comments)     Jittery, Chest pain, SOB, Congestion, Dizziness       Social History -   History     Social History     Marital Status:      Spouse Name: Bruno     Number of Children: 2     Years of Education: N/A     Occupational History      shop       [employer]      Social History Main Topics     Smoking status: Never Smoker      Smokeless tobacco: Never Used     Alcohol Use: Yes      Comment: occassionally     Drug Use: No     Sexual Activity:     Partners: Male     Other Topics Concern     Parent/Sibling W/ Cabg, Mi Or Angioplasty Before 65f 55m? No     Social History Narrative       Family History -   Family History   Problem Relation Age of Onset     Asthma Sister      Thyroid Disease Sister      Lipids Mother      Food Allergy Mother      Thyroid Disease Mother      Cerebrovascular Disease Father      Asthma Father      Thyroid Disease Brother      Diabetes Type 2  Sister      Breast Cancer Sister 60     Thyroid Disease Sister      Graves' disease Sister      Thyroid Disease Sister      Other - See Comments Son      Other - See Comments Son      C.A.D. No family hx of      Hypertension No family hx of      Cancer - colorectal No family hx of      Prostate Cancer No family hx of        Review of Systems - As per HPI and PMHx, otherwise 7 system review of the head and neck negative.    Physical Exam  Resp 16   Ht 1.626 m (5' 4\")   Wt 61.2 kg (135 lb)   SpO2 (!) 80%   BMI 23.17 kg/m      General - The patient is well nourished and well developed, and appears to have good nutritional status.  Alert and oriented to person and place, answers questions and cooperates with examination " appropriately.   Head and Face - Normocephalic and atraumatic, with no gross asymmetry noted of the contour of the facial features.  The facial nerve is intact, with strong symmetric movements.  Voice and Breathing - The patient was breathing comfortably without the use of accessory muscles. There was no wheezing, stridor, or stertor.  The patients voice was clear and strong, and had appropriate pitch and quality.  Ears - the LEFT canal was densely obstructed with thick pasty yellow cerumen.  Using procedural otoscope, alligator, and suction, I debrided the LEFT canal.  The RIGHT canal also had a lot of smei moist yellow cerumen and dead skin that I suctioned away under the binocular microscope. The tympanic membranes are normal in appearance, bony landmarks are intact.  No retraction, perforation, or masses.  Normal mobility on valsalva maneuver, with no reports of dizziness on insuflation.  No fluid or purulence was seen in the external canal or the middle ear. No evidence of infection of the middle ear or external canal, cerumen was normal in appearance.  Eyes - Extraocular movements intact, and the pupils were reactive to light.  Sclera were not icteric or injected, conjunctiva were pink and moist.  Mouth - Examination of the oral cavity showed pink, healthy oral mucosa. No lesions or ulcerations noted.  The tongue was mobile and midline, and the dentition were in good condition.          A/P - Jhoana Hernandez is a 66 year old female  (H60.8X3) Chronic eczematous otitis externa of both ears  (primary encounter diagnosis)  (H61.23) Bilateral impacted cerumen    Overall, Jhoana's health seems great.  Her ears are cleared now, and I have renewed the prednisolone drops to help suppress the chronic eczematous otitis externa that creates her fast build up in the canals.    Her allergic rhinitis is under good control, and I have renewed her Nasonex and Ophthalmic prednisolone fo rthe ear canals.    Follow up in  one year, sooner if there are any new issues.          Again, thank you for allowing me to participate in the care of your patient.        Sincerely,        Hussein Pepper MD

## 2021-06-14 NOTE — TELEPHONE ENCOUNTER
Called pharmacist and informed Hunter that patient should use drops once daily.    Jeffrey BELLA RN....6/14/2021 3:08 PM

## 2021-06-14 NOTE — TELEPHONE ENCOUNTER
Reason for Call:  Other prescription    Detailed comments: Pharmacy needs directions on eye drops Prednisolone. How many times a day or week?    Phone Number Patient can be reached at: Other phone number:  [phone]    Best Time: any    Can we leave a detailed message on this number? YES    Call taken on 6/14/2021 at 2:00 PM by Brenda Barry

## 2021-06-14 NOTE — PATIENT INSTRUCTIONS
Scheduling Information  To schedule your CT/MRI scan, please contact [facility] at [phone] OR [facility] at [phone]    To schedule your Surgery, please contact our Specialty Schedulers at [phone]      ENT Clinic Locations Clinic Hours Telephone Number     [facility]  [address] [place] [address]   Monday:           1:00pm -- 5:00pm    Friday:              8:00am - 12:00pm   To schedule/reschedule an appointment with   Dr. Pepper,   please contact our   Specialty Scheduling Department at:     [phone]       [facility]  [address] [place] [address] Tuesday:          8:00am -- 2:00pm         Urgent Care Locations Clinic Hours Telephone Numbers     [facility]  [address] [place] [address]     Monday-Friday:     11:00am - 9:00pm    Saturday-Sunday:  9:00am - 5:00pm   [phone]     [facility]  [address] [place] [address]     Monday-Friday:      5:00pm - 9:00pm     Saturday-Sunday:  9:00am - 5:00pm   [phone]

## 2021-09-12 ENCOUNTER — HEALTH MAINTENANCE LETTER (OUTPATIENT)
Age: 66
End: 2021-09-12

## 2022-02-15 ENCOUNTER — TELEPHONE (OUTPATIENT)
Dept: FAMILY MEDICINE | Facility: CLINIC | Age: 67
End: 2022-02-15
Payer: MEDICARE

## 2022-02-15 NOTE — TELEPHONE ENCOUNTER
Pt called to schedule an ear wash because she has problems with her ear wax.    No [facility] openings for this week.    Told pt that theres openings at [facility] but she does not was to drive all the way there.     Wanted the number for [facility] to see if she can make an appointment there for the ear wash.    Sara Gomez RN  [facility]

## 2022-02-23 ENCOUNTER — OFFICE VISIT (OUTPATIENT)
Dept: OTOLARYNGOLOGY | Facility: CLINIC | Age: 67
End: 2022-02-23
Payer: MEDICARE

## 2022-02-23 ENCOUNTER — OFFICE VISIT (OUTPATIENT)
Dept: AUDIOLOGY | Facility: CLINIC | Age: 67
End: 2022-02-23
Payer: MEDICARE

## 2022-02-23 VITALS — DIASTOLIC BLOOD PRESSURE: 93 MMHG | HEART RATE: 75 BPM | SYSTOLIC BLOOD PRESSURE: 174 MMHG | OXYGEN SATURATION: 99 %

## 2022-02-23 DIAGNOSIS — H61.23 BILATERAL IMPACTED CERUMEN: ICD-10-CM

## 2022-02-23 DIAGNOSIS — H90.3 SENSORINEURAL HEARING LOSS (SNHL) OF BOTH EARS: Primary | ICD-10-CM

## 2022-02-23 PROCEDURE — 99213 OFFICE O/P EST LOW 20 MIN: CPT | Mod: 25 | Performed by: OTOLARYNGOLOGY

## 2022-02-23 PROCEDURE — 92567 TYMPANOMETRY: CPT | Performed by: AUDIOLOGIST

## 2022-02-23 PROCEDURE — 99207 PR NO CHARGE LOS: CPT | Performed by: AUDIOLOGIST

## 2022-02-23 PROCEDURE — 69210 REMOVE IMPACTED EAR WAX UNI: CPT | Performed by: OTOLARYNGOLOGY

## 2022-02-23 PROCEDURE — 92557 COMPREHENSIVE HEARING TEST: CPT | Performed by: AUDIOLOGIST

## 2022-02-23 ASSESSMENT — ENCOUNTER SYMPTOMS
TREMORS: 0
COUGH: 0
CONSTITUTIONAL NEGATIVE: 1
TINGLING: 0
DOUBLE VISION: 0
VOMITING: 0
BLURRED VISION: 0
HEADACHES: 0
BRUISES/BLEEDS EASILY: 0
HEARTBURN: 0
NAUSEA: 0
DIZZINESS: 0
PHOTOPHOBIA: 0
HEMOPTYSIS: 0

## 2022-02-23 NOTE — NURSING NOTE
Jhoana Hernandez's goals for this visit include:   Chief Complaint   Patient presents with     Follow Up     Recheck ear impaction, worse in left       She requests these members of her care team be copied on today's visit information: yes    PCP: Nelson Camp    Referring Provider:  No referring provider defined for this encounter.

## 2022-02-23 NOTE — PROGRESS NOTES
Chief Complaint   Patient presents with     Follow Up     Recheck ear impaction, worse in left

## 2022-02-23 NOTE — PROGRESS NOTES
AUDIOLOGY REPORT    SUMMARY: Audiology visit completed. See audiogram for results.    RECOMMENDATIONS: Follow-up with ENT.    Ebenezer Danielle  Doctor of Audiology  [place] License # [id number]

## 2022-02-23 NOTE — LETTER
2/23/2022         RE: Jhoana Hernandez  [address] [facility] [address]        Dear Colleague,    Thank you for referring your patient, Jhoana Hernandez, to the [facility]. Please see a copy of my visit note below.    Chief Complaint   Patient presents with     Follow Up     Recheck ear impaction, worse in left         HPI    Hx: Pt suspects gradual hearing loss bilaterally. Denies otalgia, aural fullness, otorrhea, tinnitus, and dizziness.  Results: WNL sloping to mild SNHL rising back to WNL bilaterally. 100% word rec. bilaterally. Tymps WNL.    ENT Problem List  Seasonal allergic rhinitis    Subclinical hypothyroidism    Hollenhorst plaque, left eye    Chronic eczematous otitis externa of both ears    CARDIOVASCULAR SCREENING; LDL GOAL LESS THAN 160    Lactose intolerance    Allergy, food    Advanced directives, counseling/discussion    Osteopenia    Family history of breast cancer    Allergies        Docosahexaenoic Acid-epaAnaphylaxis   FishAnaphylaxis   NutsAnaphylaxis   Shellfish AllergyAnaphylaxis   AloeHives   Aloe Vera   Coconut OilOther (See Comments)   Fish   Fluticasone   FoodHives   Hydrogenated Palm Oil GlyceridesOther (See Comments)   Ibuprofen Sodium   Macrodantin [Nitrofuran Derivatives]   Mineral OilItching   Peanut (Diagnostic)   Seafood   Sulfa Drugs   Yellow Dye   Yellow DyesItching   IbuprofenOther (See Comments)      Medications       albuterol (PROAIR HFA/PROVENTIL HFA/VENTOLIN HFA) 108 (90 Base) MCG/ACT inhaler      azelastine (ASTEPRO) 0.15 % nasal spray      conjugated estrogens (PREMARIN) 0.625 MG/GM vaginal cream      diphenhydrAMINE (BENADRYL) 25 MG tablet      EPINEPHrine (ANY BX GENERIC EQUIV) 0.3 MG/0.3ML injection 2-pack      hydrocortisone 2.5 % cream      loratadine (CLARITIN) 10 MG tablet      mometasone (NASONEX) 50 MCG/ACT nasal spray      prednisoLONE acetate (PRED FORTE) 1 % ophthalmic suspension         Review of Systems    Constitutional: Negative.    HENT: Positive for hearing loss. Negative for congestion, ear discharge, ear pain, nosebleeds and tinnitus.    Eyes: Negative for blurred vision, double vision and photophobia.   Respiratory: Negative for cough and hemoptysis.    Gastrointestinal: Negative for heartburn, nausea and vomiting.   Skin: Negative.    Neurological: Negative for dizziness, tingling, tremors and headaches.   Endo/Heme/Allergies: Negative for environmental allergies. Does not bruise/bleed easily.         Physical Exam  Vitals reviewed.   Constitutional:       Appearance: Normal appearance.   HENT:      Head: Normocephalic and atraumatic.      Right Ear: Tympanic membrane, ear canal and external ear normal. Decreased hearing noted. No middle ear effusion. There is impacted cerumen.      Left Ear: Tympanic membrane, ear canal and external ear normal. Decreased hearing noted.  No middle ear effusion. There is impacted cerumen.      Nose: Nose normal. No mucosal edema, congestion or rhinorrhea.      Right Turbinates: Not enlarged or swollen.      Left Turbinates: Not enlarged or swollen.      Mouth/Throat:      Mouth: Mucous membranes are moist.      Pharynx: Oropharynx is clear. Uvula midline.   Eyes:      Extraocular Movements: Extraocular movements intact.      Pupils: Pupils are equal, round, and reactive to light.   Neurological:      Mental Status: She is alert.       Ear wax removal: Ear wax blocked the external ear canal 60% bilaterally, suctioned under the microscope. Ear drums appeared to be intact.    A/P    This pleasant patient is having sloping to mild SNHL rising back to WNL bilaterally. 100% word rec. bilaterally. Tymps WNL. F/up as needed.          Again, thank you for allowing me to participate in the care of your patient.        Sincerely,        Adrian Lagunas MD

## 2022-02-23 NOTE — PROGRESS NOTES
HPI    Hx: Pt suspects gradual hearing loss bilaterally. Denies otalgia, aural fullness, otorrhea, tinnitus, and dizziness.  Results: WNL sloping to mild SNHL rising back to WNL bilaterally. 100% word rec. bilaterally. Tymps WNL.    ENT Problem List  Seasonal allergic rhinitis    Subclinical hypothyroidism    Hollenhorst plaque, left eye    Chronic eczematous otitis externa of both ears    CARDIOVASCULAR SCREENING; LDL GOAL LESS THAN 160    Lactose intolerance    Allergy, food    Advanced directives, counseling/discussion    Osteopenia    Family history of breast cancer    Allergies        Docosahexaenoic Acid-epaAnaphylaxis   FishAnaphylaxis   NutsAnaphylaxis   Shellfish AllergyAnaphylaxis   AloeHives   Aloe Vera   Coconut OilOther (See Comments)   Fish   Fluticasone   FoodHives   Hydrogenated Palm Oil GlyceridesOther (See Comments)   Ibuprofen Sodium   Macrodantin [Nitrofuran Derivatives]   Mineral OilItching   Peanut (Diagnostic)   Seafood   Sulfa Drugs   Yellow Dye   Yellow DyesItching   IbuprofenOther (See Comments)      Medications       albuterol (PROAIR HFA/PROVENTIL HFA/VENTOLIN HFA) 108 (90 Base) MCG/ACT inhaler      azelastine (ASTEPRO) 0.15 % nasal spray      conjugated estrogens (PREMARIN) 0.625 MG/GM vaginal cream      diphenhydrAMINE (BENADRYL) 25 MG tablet      EPINEPHrine (ANY BX GENERIC EQUIV) 0.3 MG/0.3ML injection 2-pack      hydrocortisone 2.5 % cream      loratadine (CLARITIN) 10 MG tablet      mometasone (NASONEX) 50 MCG/ACT nasal spray      prednisoLONE acetate (PRED FORTE) 1 % ophthalmic suspension         Review of Systems   Constitutional: Negative.    HENT: Positive for hearing loss. Negative for congestion, ear discharge, ear pain, nosebleeds and tinnitus.    Eyes: Negative for blurred vision, double vision and photophobia.   Respiratory: Negative for cough and hemoptysis.    Gastrointestinal: Negative for heartburn, nausea and vomiting.   Skin: Negative.    Neurological: Negative for  dizziness, tingling, tremors and headaches.   Endo/Heme/Allergies: Negative for environmental allergies. Does not bruise/bleed easily.         Physical Exam  Vitals reviewed.   Constitutional:       Appearance: Normal appearance.   HENT:      Head: Normocephalic and atraumatic.      Right Ear: Tympanic membrane, ear canal and external ear normal. Decreased hearing noted. No middle ear effusion. There is impacted cerumen.      Left Ear: Tympanic membrane, ear canal and external ear normal. Decreased hearing noted.  No middle ear effusion. There is impacted cerumen.      Nose: Nose normal. No mucosal edema, congestion or rhinorrhea.      Right Turbinates: Not enlarged or swollen.      Left Turbinates: Not enlarged or swollen.      Mouth/Throat:      Mouth: Mucous membranes are moist.      Pharynx: Oropharynx is clear. Uvula midline.   Eyes:      Extraocular Movements: Extraocular movements intact.      Pupils: Pupils are equal, round, and reactive to light.   Neurological:      Mental Status: She is alert.       Ear wax removal: Ear wax blocked the external ear canal 60% bilaterally, suctioned under the microscope. Ear drums appeared to be intact.    A/P    This pleasant patient is having sloping to mild SNHL rising back to WNL bilaterally. 100% word rec. bilaterally. Tymps WNL. F/up as needed.

## 2022-04-11 ENCOUNTER — NURSE TRIAGE (OUTPATIENT)
Dept: NURSING | Facility: CLINIC | Age: 67
End: 2022-04-11
Payer: MEDICARE

## 2022-04-11 ENCOUNTER — OFFICE VISIT (OUTPATIENT)
Dept: URGENT CARE | Facility: URGENT CARE | Age: 67
End: 2022-04-11
Payer: MEDICARE

## 2022-04-11 VITALS
DIASTOLIC BLOOD PRESSURE: 92 MMHG | BODY MASS INDEX: 23.17 KG/M2 | RESPIRATION RATE: 20 BRPM | SYSTOLIC BLOOD PRESSURE: 154 MMHG | HEART RATE: 77 BPM | OXYGEN SATURATION: 100 % | TEMPERATURE: 98.4 F | WEIGHT: 135 LBS

## 2022-04-11 DIAGNOSIS — J32.9 SINUSITIS, BACTERIAL: Primary | ICD-10-CM

## 2022-04-11 DIAGNOSIS — B96.89 SINUSITIS, BACTERIAL: Primary | ICD-10-CM

## 2022-04-11 PROCEDURE — 99213 OFFICE O/P EST LOW 20 MIN: CPT | Performed by: NURSE PRACTITIONER

## 2022-04-11 RX ORDER — AZITHROMYCIN 250 MG/1
TABLET, FILM COATED ORAL
Qty: 6 TABLET | Refills: 0 | Status: SHIPPED | OUTPATIENT
Start: 2022-04-11 | End: 2022-04-16

## 2022-04-11 NOTE — PATIENT INSTRUCTIONS
Home care for sinusitis includes; antibiotic take Azithromycin as directed daily with food as this may cause stomach upset. Please take with a probiotic (not directly with) two hours prior or after, to protect good bacteria in colon.   Humidifier in room if available. May use flonase daily to help decrease swelling and dry up drainage. Recommend saline lavage to help remove mucous and moisturize sinuses.     Please follow up in clinic, with your primary care provider if symptoms not improving with treatment.

## 2022-04-11 NOTE — PROGRESS NOTES
Assessment & Plan      Diagnosis Comments   1. Sinusitis, bacterial  azithromycin (ZITHROMAX) 250 MG tablet      Differential diagnosis includes viral sinusitis.  Seasonal allergy.    Home instructions reviewed patient verbalized understanding    Patient Instructions   Home care for sinusitis includes; antibiotic take Azithromycin as directed daily with food as this may cause stomach upset. Please take with a probiotic (not directly with) two hours prior or after, to protect good bacteria in colon.   Humidifier in room if available. May use flonase daily to help decrease swelling and dry up drainage. Recommend saline lavage to help remove mucous and moisturize sinuses.     Please follow up in clinic, with your primary care provider if symptoms not improving with treatment.                   JOHNNIE Burnham [facility]    Ann Ely is a 67 year old female who presents to clinic today for the following health issues:  Chief Complaint   Patient presents with     Sinus Problem     Ear pain, congestive, headache, facial pressure, and body soreness. X  1 week.      HPI    Patient with 2 week history of sinusitis states symptoms have most recently increase in symptoms of pain in facial pressure and headache, she has tried her normal sinus spray and this is not helping symptoms. She is having ear pressure and pain as well.     URI Adult    Onset of symptoms was 2 week(s) ago.  Course of illness is worsening.    Severity severe  Current and Associated symptoms: runny nose, ear pain bilateral and facial pain/pressure  Treatment measures tried include Decongestants, Antihistamine, Fluids and Rest.  Predisposing factors include None.        Review of Systems  Constitutional, HEENT, cardiovascular, pulmonary, gi and gu systems are negative, except as otherwise noted.      Objective    BP (!) 154/92 (BP Location: Left arm, Patient Position: Sitting, Cuff Size: Adult Small)    Pulse 77   Temp 98.4  F (36.9  C) (Oral)   Resp 20   Wt 61.2 kg (135 lb)   SpO2 100%   Breastfeeding No   BMI 23.17 kg/m    Physical Exam   GENERAL: alert and no distress  EYES: Eyes grossly normal to inspection, PERRL and conjunctivae and sclerae normal  HENT: normal cephalic/atraumatic, both ears: erythematous, nose and mouth without ulcers or lesions, nasal mucosa edematous , rhinorrhea yellow, oropharynx clear, oral mucous membranes moist, tonsillar erythema and sinuses: maxillary, frontal tenderness on bilateral  NECK: bilateral anterior cervical adenopathy, no asymmetry, masses, or scars and thyroid normal to palpation  RESP: lungs clear to auscultation - no rales, rhonchi or wheezes  CV: regular rate and rhythm, normal S1 S2, no S3 or S4, no murmur, click or rub, no peripheral edema and peripheral pulses strong  MS: no gross musculoskeletal defects noted, no edema

## 2022-04-11 NOTE — TELEPHONE ENCOUNTER
Sinus pain , pressure and ear pain.    Chills ar times this weekend.  HA and getting worse. Tried nasonex.    Going to .    Aurelia Gardner RN  [facility] Nurse Advisor        Reason for Disposition    SEVERE sinus pain    Additional Information    Negative: Sounds like a life-threatening emergency to the triager    Negative: Difficulty breathing, and not from stuffy nose (e.g., not relieved by cleaning out the nose)    Negative: SEVERE headache and has fever    Negative: Patient sounds very sick or weak to the triager    Protocols used: SINUS PAIN OR CONGESTION-A-OH

## 2022-04-14 ENCOUNTER — TELEPHONE (OUTPATIENT)
Dept: FAMILY MEDICINE | Facility: CLINIC | Age: 67
End: 2022-04-14
Payer: MEDICARE

## 2022-04-14 NOTE — TELEPHONE ENCOUNTER
Reason for Call:  Same Day Appointment, Requested Provider:  Nelson Camp    PCP: Nelson Camp    Reason for visit: sinus inf. Pt was seen at .Still not better.    Duration of symptoms: 2 wks    Have you been treated for this in the past? Yes    Additional comments:     Can we leave a detailed message on this number? YES    Phone number patient can be reached at: Home number on file [phone] (home)    Best Time:     Call taken on 4/14/2022 at 7:05 AM by Alma Ford

## 2022-04-15 ENCOUNTER — OFFICE VISIT (OUTPATIENT)
Dept: URGENT CARE | Facility: URGENT CARE | Age: 67
End: 2022-04-15
Payer: MEDICARE

## 2022-04-15 VITALS
TEMPERATURE: 97.8 F | SYSTOLIC BLOOD PRESSURE: 144 MMHG | HEART RATE: 73 BPM | HEIGHT: 63 IN | WEIGHT: 138.6 LBS | DIASTOLIC BLOOD PRESSURE: 81 MMHG | BODY MASS INDEX: 24.56 KG/M2 | OXYGEN SATURATION: 99 %

## 2022-04-15 DIAGNOSIS — J01.90 ACUTE SINUSITIS WITH SYMPTOMS > 10 DAYS: ICD-10-CM

## 2022-04-15 DIAGNOSIS — H92.02 LEFT EAR PAIN: Primary | ICD-10-CM

## 2022-04-15 PROCEDURE — 99213 OFFICE O/P EST LOW 20 MIN: CPT | Performed by: PHYSICIAN ASSISTANT

## 2022-04-15 ASSESSMENT — ENCOUNTER SYMPTOMS
SHORTNESS OF BREATH: 0
DIARRHEA: 0
NECK STIFFNESS: 0
CARDIOVASCULAR NEGATIVE: 1
CHILLS: 0
EYES NEGATIVE: 1
WOUND: 0
PALPITATIONS: 0
BACK PAIN: 0
RESPIRATORY NEGATIVE: 1
MYALGIAS: 0
MUSCULOSKELETAL NEGATIVE: 1
FEVER: 0
DIZZINESS: 0
RHINORRHEA: 0
NAUSEA: 0
HEADACHES: 0
ALLERGIC/IMMUNOLOGIC NEGATIVE: 1
NECK PAIN: 0
JOINT SWELLING: 0
COUGH: 0
VOMITING: 0
WEAKNESS: 0
ARTHRALGIAS: 0
ENDOCRINE NEGATIVE: 1
SORE THROAT: 0
LIGHT-HEADEDNESS: 0

## 2022-04-28 NOTE — PROGRESS NOTES
"History of Present Illness - Jhoana Hernandez is a 67 year old female last seen on 6/14/2021    To review, for many years she has noted a sense of fullness and itching in the ears. She has tried various OTC drops before, but nothing seems to help.  She has never had ear surgery or chronic ear disease.  However, she notes that there seems to be a routine event every fall and spring where her ears seem more stuffy and full.  On exam, after debriding out the canals, her exam strongly suggested chronic eczematous otitis externa.  I would have liked to have used dermotic, but she has a severe anaphylactic nut allergy and therefore I had her use more home treatments like olive oil.  She tells me that it has seemed to help.  Her ears will get stuffy at times.    Also, she had complaints of some dull aching and fullness around the ears that would come and go.  She wears invisalign braces, and felt that this was temporomandibular disease.  I gave her a home therapy sheet.  And at the 9/30/13 visit, this problem had settled down just fine.  She had returned 4/12/16 due to a recurrence of ear pain.  This started about a month prior and she was diagnosed with otitis media and sinusitis.  Antibiotics were given, and things improved.    With regards to her temporomandibular disease, she does tell me that she notes that when she skips wearing her invisalign brace overnight the ear and facial aches do get better.    She has returned primarily to have her ears checked because of fullness since she ran out of prednisolone drops for the ears, more in the LEFT ear.    The main reason for today's follow up is her nose and throat feel congested, possibly due to her seasonal allergies, which are again worse this year.  But she also notes that about 2 weeks ago she acutely had pressure and \"burning\" in the head, and assumed it was sinusitis.  She was seen in urgent care, and was given a Z Pack which helped a bit.  She went back to " urgent care and was treated with Augmentin and that gave much more relief.      She takes as needed claritin, Nasonex, and even benadryl when things are really bad. But stopped the medications while this recent episode was happening.  She is still having post nasal drainage, congestion.      Past Medical History -   Patient Active Problem List   Diagnosis     CARDIOVASCULAR SCREENING; LDL GOAL LESS THAN 160     Lactose intolerance     Seasonal allergic rhinitis     Allergy, food     Advanced directives, counseling/discussion     Subclinical hypothyroidism     Osteopenia     Hollenhorst plaque, left eye     Family history of breast cancer     Chronic eczematous otitis externa of both ears       Current Medications -   Current Outpatient Medications:      albuterol (PROAIR HFA/PROVENTIL HFA/VENTOLIN HFA) 108 (90 Base) MCG/ACT inhaler, Inhale 2 puffs into the lungs every 6 hours as needed for shortness of breath / dyspnea or wheezing, Disp: , Rfl:      azelastine (ASTEPRO) 0.15 % nasal spray, Spray 2 sprays into both nostrils 2 times daily, Disp: 30 mL, Rfl: 11     conjugated estrogens (PREMARIN) 0.625 MG/GM vaginal cream, Place 1 g vaginally twice a week As needed, Disp: 30 g, Rfl: 3     diphenhydrAMINE (BENADRYL) 25 MG tablet, Take 25 mg by mouth every 6 hours as needed for itching or allergies, Disp: , Rfl:      EPINEPHrine (ANY BX GENERIC EQUIV) 0.3 MG/0.3ML injection 2-pack, Inject 0.3 mLs (0.3 mg) into the muscle as needed for anaphylaxis (or exposure to trigger), Disp: 0.6 mL, Rfl: 1     hydrocortisone 2.5 % cream, Apply sparingly to affected area 2 times daily for no more than 14 days, Disp: 30 g, Rfl: 0     loratadine (CLARITIN) 10 MG tablet, Take 10 mg by mouth daily as needed for allergies, Disp: , Rfl:      mometasone (NASONEX) 50 MCG/ACT nasal spray, Spray 2 sprays into both nostrils daily for allergy prevention., Disp: 17 g, Rfl: 11     prednisoLONE acetate (PRED FORTE) 1 % ophthalmic suspension, 4 drops  into both ears twice as needed, Disp: 10 mL, Rfl: 11    Allergies -   Allergies   Allergen Reactions     Docosahexaenoic Acid-Epa Anaphylaxis     Fish Anaphylaxis     Nuts Anaphylaxis     [place] Oil Shortness Of Breath     Shellfish Allergy Anaphylaxis     Aloe Hives     Aloe Vera      Canola Oil [Vegetable Oil] Other (See Comments)     Sneezing     Coconut Oil Other (See Comments)     Fish      Fluticasone      Food Hives     Hydrogenated Palm Oil Glycerides Other (See Comments)     Ibuprofen Sodium      Macrodantin [Nitrofuran Derivatives]      Mineral Oil Itching     Peanut (Diagnostic)      Seafood      Sulfa Drugs      Yellow Dye      Yellow Dyes Itching     Ibuprofen Other (See Comments)     Jittery, Chest pain, SOB, Congestion, Dizziness       Social History -   History     Social History     Marital Status:      Spouse Name: Bruno     Number of Children: 2     Years of Education: N/A     Occupational History      shop       [employer]      Social History Main Topics     Smoking status: Never Smoker      Smokeless tobacco: Never Used     Alcohol Use: Yes      Comment: occassionally     Drug Use: No     Sexual Activity:     Partners: Male     Other Topics Concern     Parent/Sibling W/ Cabg, Mi Or Angioplasty Before 65f 55m? No     Social History Narrative       Family History -   Family History   Problem Relation Age of Onset     Asthma Sister      Thyroid Disease Sister      Lipids Mother      Food Allergy Mother      Thyroid Disease Mother      Cerebrovascular Disease Father      Asthma Father      Thyroid Disease Brother      Diabetes Type 2  Sister      Breast Cancer Sister 60     Thyroid Disease Sister      Graves' disease Sister      Thyroid Disease Sister      Other - See Comments Son      Other - See Comments Son      C.A.D. No family hx of      Hypertension No family hx of      Cancer - colorectal No family hx of      Prostate Cancer No family hx of        Review of  Systems - As per HPI and PMHx, otherwise 7 system review of the head and neck negative.    Physical Exam  BP (!) 149/90 (BP Location: Right arm, Patient Position: Sitting, Cuff Size: Adult Regular)   Pulse 81   Wt 62.6 kg (138 lb)   SpO2 98%   BMI 24.45 kg/m      General - The patient is well nourished and well developed, and appears to have good nutritional status.  Alert and oriented to person and place, answers questions and cooperates with examination appropriately.   Head and Face - Normocephalic and atraumatic, with no gross asymmetry noted of the contour of the facial features.  The facial nerve is intact, with strong symmetric movements.  Voice and Breathing - The patient was breathing comfortably without the use of accessory muscles. There was no wheezing, stridor, or stertor.  The patients voice was clear and strong, and had appropriate pitch and quality.  Ears - The tympanic membranes are normal in appearance, bony landmarks are intact.  No retraction, perforation, or masses.  Normal mobility on valsalva maneuver, with no reports of dizziness on insuflation.  No fluid or purulence was seen in the external canal or the middle ear. No evidence of infection of the middle ear or external canal, cerumen was normal in appearance.    Eyes - Extraocular movements intact, and the pupils were reactive to light.  Sclera were not icteric or injected, conjunctiva were pink and moist.  Mouth - Examination of the oral cavity showed pink, healthy oral mucosa. No lesions or ulcerations noted.  The tongue was mobile and midline, and the dentition were in good condition.          A/P - Jhoana Hernandez is a 67 year old female  (J01.41) Acute recurrent pansinusitis  (primary encounter diagnosis)  (J30.1) Seasonal allergic rhinitis due to pollen  (J30.1) Chronic seasonal allergic rhinitis due to pollen    She certainly sounds like she had a sinusitis that was finally relieved with the augmentin.    We also reviewed  all her medications, including the role of sudafed, benadryl, Nasonex and even as needed neti pot.    I suspect that this was a viral sinusitis, and this is a post-viral syndrome.  I will treat with 2 weeks of low dose prednisone.    If that does not help, contact me via MyChart and I can try to add budesonide to [facility], or a compounded nasal irrigations.  Finally, we will hold off on CT sinus for now.    Also, as another topic she mentions that she uses invisalign, and does get some soreness of the TMJ;s

## 2022-05-02 ENCOUNTER — OFFICE VISIT (OUTPATIENT)
Dept: OTOLARYNGOLOGY | Facility: CLINIC | Age: 67
End: 2022-05-02
Payer: MEDICARE

## 2022-05-02 VITALS
HEART RATE: 81 BPM | SYSTOLIC BLOOD PRESSURE: 149 MMHG | DIASTOLIC BLOOD PRESSURE: 90 MMHG | OXYGEN SATURATION: 98 % | BODY MASS INDEX: 24.45 KG/M2 | WEIGHT: 138 LBS

## 2022-05-02 DIAGNOSIS — J30.1 CHRONIC SEASONAL ALLERGIC RHINITIS DUE TO POLLEN: ICD-10-CM

## 2022-05-02 DIAGNOSIS — J01.41 ACUTE RECURRENT PANSINUSITIS: Primary | ICD-10-CM

## 2022-05-02 DIAGNOSIS — J30.1 SEASONAL ALLERGIC RHINITIS DUE TO POLLEN: ICD-10-CM

## 2022-05-02 PROCEDURE — 99214 OFFICE O/P EST MOD 30 MIN: CPT | Performed by: OTOLARYNGOLOGY

## 2022-05-02 RX ORDER — PREDNISONE 10 MG/1
TABLET ORAL
Qty: 21 TABLET | Refills: 2 | Status: SHIPPED | OUTPATIENT
Start: 2022-05-02 | End: 2023-02-27

## 2022-05-02 NOTE — LETTER
5/2/2022         RE: Jhoana Hernandez  [address] [place] [address]        Dear Colleague,    Thank you for referring your patient, Jhoana Hernandez, to the [facility]. Please see a copy of my visit note below.    History of Present Illness - Jhoana Hernandez is a 67 year old female last seen on 6/14/2021    To review, for many years she has noted a sense of fullness and itching in the ears. She has tried various OTC drops before, but nothing seems to help.  She has never had ear surgery or chronic ear disease.  However, she notes that there seems to be a routine event every fall and spring where her ears seem more stuffy and full.  On exam, after debriding out the canals, her exam strongly suggested chronic eczematous otitis externa.  I would have liked to have used dermotic, but she has a severe anaphylactic nut allergy and therefore I had her use more home treatments like olive oil.  She tells me that it has seemed to help.  Her ears will get stuffy at times.    Also, she had complaints of some dull aching and fullness around the ears that would come and go.  She wears invisalign braces, and felt that this was temporomandibular disease.  I gave her a home therapy sheet.  And at the 9/30/13 visit, this problem had settled down just fine.  She had returned 4/12/16 due to a recurrence of ear pain.  This started about a month prior and she was diagnosed with otitis media and sinusitis.  Antibiotics were given, and things improved.    With regards to her temporomandibular disease, she does tell me that she notes that when she skips wearing her invisalign brace overnight the ear and facial aches do get better.    She has returned primarily to have her ears checked because of fullness since she ran out of prednisolone drops for the ears, more in the LEFT ear.    The main reason for today's follow up is her nose and throat feel congested, possibly due to her  "seasonal allergies, which are again worse this year.  But she also notes that about 2 weeks ago she acutely had pressure and \"burning\" in the head, and assumed it was sinusitis.  She was seen in urgent care, and was given a Z Pack which helped a bit.  She went back to urgent care and was treated with Augmentin and that gave much more relief.      She takes as needed claritin, Nasonex, and even benadryl when things are really bad. But stopped the medications while this recent episode was happening.  She is still having post nasal drainage, congestion.      Past Medical History -   Patient Active Problem List   Diagnosis     CARDIOVASCULAR SCREENING; LDL GOAL LESS THAN 160     Lactose intolerance     Seasonal allergic rhinitis     Allergy, food     Advanced directives, counseling/discussion     Subclinical hypothyroidism     Osteopenia     Hollenhorst plaque, left eye     Family history of breast cancer     Chronic eczematous otitis externa of both ears       Current Medications -   Current Outpatient Medications:      albuterol (PROAIR HFA/PROVENTIL HFA/VENTOLIN HFA) 108 (90 Base) MCG/ACT inhaler, Inhale 2 puffs into the lungs every 6 hours as needed for shortness of breath / dyspnea or wheezing, Disp: , Rfl:      azelastine (ASTEPRO) 0.15 % nasal spray, Spray 2 sprays into both nostrils 2 times daily, Disp: 30 mL, Rfl: 11     conjugated estrogens (PREMARIN) 0.625 MG/GM vaginal cream, Place 1 g vaginally twice a week As needed, Disp: 30 g, Rfl: 3     diphenhydrAMINE (BENADRYL) 25 MG tablet, Take 25 mg by mouth every 6 hours as needed for itching or allergies, Disp: , Rfl:      EPINEPHrine (ANY BX GENERIC EQUIV) 0.3 MG/0.3ML injection 2-pack, Inject 0.3 mLs (0.3 mg) into the muscle as needed for anaphylaxis (or exposure to trigger), Disp: 0.6 mL, Rfl: 1     hydrocortisone 2.5 % cream, Apply sparingly to affected area 2 times daily for no more than 14 days, Disp: 30 g, Rfl: 0     loratadine (CLARITIN) 10 MG tablet, " Take 10 mg by mouth daily as needed for allergies, Disp: , Rfl:      mometasone (NASONEX) 50 MCG/ACT nasal spray, Spray 2 sprays into both nostrils daily for allergy prevention., Disp: 17 g, Rfl: 11     prednisoLONE acetate (PRED FORTE) 1 % ophthalmic suspension, 4 drops into both ears twice as needed, Disp: 10 mL, Rfl: 11    Allergies -   Allergies   Allergen Reactions     Docosahexaenoic Acid-Epa Anaphylaxis     Fish Anaphylaxis     Nuts Anaphylaxis     [place] Oil Shortness Of Breath     Shellfish Allergy Anaphylaxis     Aloe Hives     Aloe Vera      Canola Oil [Vegetable Oil] Other (See Comments)     Sneezing     Coconut Oil Other (See Comments)     Fish      Fluticasone      Food Hives     Hydrogenated Palm Oil Glycerides Other (See Comments)     Ibuprofen Sodium      Macrodantin [Nitrofuran Derivatives]      Mineral Oil Itching     Peanut (Diagnostic)      Seafood      Sulfa Drugs      Yellow Dye      Yellow Dyes Itching     Ibuprofen Other (See Comments)     Jittery, Chest pain, SOB, Congestion, Dizziness       Social History -   History     Social History     Marital Status:      Spouse Name: Bruno     Number of Children: 2     Years of Education: N/A     Occupational History      shop       [employer]      Social History Main Topics     Smoking status: Never Smoker      Smokeless tobacco: Never Used     Alcohol Use: Yes      Comment: occassionally     Drug Use: No     Sexual Activity:     Partners: Male     Other Topics Concern     Parent/Sibling W/ Cabg, Mi Or Angioplasty Before 65f 55m? No     Social History Narrative       Family History -   Family History   Problem Relation Age of Onset     Asthma Sister      Thyroid Disease Sister      Lipids Mother      Food Allergy Mother      Thyroid Disease Mother      Cerebrovascular Disease Father      Asthma Father      Thyroid Disease Brother      Diabetes Type 2  Sister      Breast Cancer Sister 60     Thyroid Disease Sister       Graves' disease Sister      Thyroid Disease Sister      Other - See Comments Son      Other - See Comments Son      LEÓN.A.D. No family hx of      Hypertension No family hx of      Cancer - colorectal No family hx of      Prostate Cancer No family hx of        Review of Systems - As per HPI and PMHx, otherwise 7 system review of the head and neck negative.    Physical Exam  BP (!) 149/90 (BP Location: Right arm, Patient Position: Sitting, Cuff Size: Adult Regular)   Pulse 81   Wt 62.6 kg (138 lb)   SpO2 98%   BMI 24.45 kg/m      General - The patient is well nourished and well developed, and appears to have good nutritional status.  Alert and oriented to person and place, answers questions and cooperates with examination appropriately.   Head and Face - Normocephalic and atraumatic, with no gross asymmetry noted of the contour of the facial features.  The facial nerve is intact, with strong symmetric movements.  Voice and Breathing - The patient was breathing comfortably without the use of accessory muscles. There was no wheezing, stridor, or stertor.  The patients voice was clear and strong, and had appropriate pitch and quality.  Ears - The tympanic membranes are normal in appearance, bony landmarks are intact.  No retraction, perforation, or masses.  Normal mobility on valsalva maneuver, with no reports of dizziness on insuflation.  No fluid or purulence was seen in the external canal or the middle ear. No evidence of infection of the middle ear or external canal, cerumen was normal in appearance.    Eyes - Extraocular movements intact, and the pupils were reactive to light.  Sclera were not icteric or injected, conjunctiva were pink and moist.  Mouth - Examination of the oral cavity showed pink, healthy oral mucosa. No lesions or ulcerations noted.  The tongue was mobile and midline, and the dentition were in good condition.          A/P - Jhoana Hernandez is a 67 year old female  (J01.41) Acute recurrent  pansinusitis  (primary encounter diagnosis)  (J30.1) Seasonal allergic rhinitis due to pollen  (J30.1) Chronic seasonal allergic rhinitis due to pollen    She certainly sounds like she had a sinusitis that was finally relieved with the augmentin.    We also reviewed all her medications, including the role of sudafed, benadryl, Nasonex and even as needed neti pot.    I suspect that this was a viral sinusitis, and this is a post-viral syndrome.  I will treat with 2 weeks of low dose prednisone.    If that does not help, contact me via MyChart and I can try to add budesonide to NeilMed, or a compounded nasal irrigations.  Finally, we will hold off on CT sinus for now.    Also, as another topic she mentions that she uses invisalign, and does get some soreness of the TMJ;s         Again, thank you for allowing me to participate in the care of your patient.        Sincerely,        Hussein Pepper MD

## 2022-05-02 NOTE — NURSING NOTE
"Chief Complaint   Patient presents with     Sinusitis     Sinus drainage and throat issues       Vitals:    05/02/22 0936   BP: (!) 149/90   BP Location: Right arm   Patient Position: Sitting   Cuff Size: Adult Regular   Pulse: 81   SpO2: 98%   Weight: 62.6 kg (138 lb)     Wt Readings from Last 1 Encounters:   05/02/22 62.6 kg (138 lb)     Ht Readings from Last 1 Encounters:   04/15/22 1.6 m (5' 3\")       Joon Terrazas [facility], 5/2/2022 9:38 AM    "

## 2022-05-17 ENCOUNTER — OFFICE VISIT (OUTPATIENT)
Dept: FAMILY MEDICINE | Facility: CLINIC | Age: 67
End: 2022-05-17
Payer: MEDICARE

## 2022-05-17 VITALS
SYSTOLIC BLOOD PRESSURE: 131 MMHG | OXYGEN SATURATION: 99 % | HEART RATE: 90 BPM | HEIGHT: 64 IN | WEIGHT: 136 LBS | DIASTOLIC BLOOD PRESSURE: 87 MMHG | BODY MASS INDEX: 23.22 KG/M2 | RESPIRATION RATE: 20 BRPM | TEMPERATURE: 97.7 F

## 2022-05-17 DIAGNOSIS — Z13.1 SCREENING FOR DIABETES MELLITUS: ICD-10-CM

## 2022-05-17 DIAGNOSIS — Z23 NEED FOR VACCINATION: ICD-10-CM

## 2022-05-17 DIAGNOSIS — J30.1 SEASONAL ALLERGIC RHINITIS DUE TO POLLEN: ICD-10-CM

## 2022-05-17 DIAGNOSIS — Z12.31 BREAST CANCER SCREENING BY MAMMOGRAM: ICD-10-CM

## 2022-05-17 DIAGNOSIS — E03.8 SUBCLINICAL HYPOTHYROIDISM: ICD-10-CM

## 2022-05-17 DIAGNOSIS — Z86.39 HISTORY OF ELEVATED GLUCOSE: ICD-10-CM

## 2022-05-17 DIAGNOSIS — Z00.00 ENCOUNTER FOR MEDICARE ANNUAL WELLNESS EXAM: Primary | ICD-10-CM

## 2022-05-17 DIAGNOSIS — Z91.018 ALLERGY, FOOD: ICD-10-CM

## 2022-05-17 DIAGNOSIS — Z80.3 FAMILY HISTORY OF BREAST CANCER: ICD-10-CM

## 2022-05-17 LAB
CHOLEST SERPL-MCNC: 222 MG/DL
FASTING STATUS PATIENT QL REPORTED: YES
FASTING STATUS PATIENT QL REPORTED: YES
GLUCOSE BLD-MCNC: 108 MG/DL (ref 70–99)
HBA1C MFR BLD: 5.5 % (ref 0–5.6)
HDLC SERPL-MCNC: 84 MG/DL
LDLC SERPL CALC-MCNC: 119 MG/DL
NONHDLC SERPL-MCNC: 138 MG/DL
TRIGL SERPL-MCNC: 93 MG/DL
TSH SERPL DL<=0.005 MIU/L-ACNC: 3.03 MU/L (ref 0.4–4)

## 2022-05-17 PROCEDURE — 84443 ASSAY THYROID STIM HORMONE: CPT | Performed by: FAMILY MEDICINE

## 2022-05-17 PROCEDURE — 90732 PPSV23 VACC 2 YRS+ SUBQ/IM: CPT | Performed by: FAMILY MEDICINE

## 2022-05-17 PROCEDURE — 80061 LIPID PANEL: CPT | Performed by: FAMILY MEDICINE

## 2022-05-17 PROCEDURE — G0009 ADMIN PNEUMOCOCCAL VACCINE: HCPCS | Performed by: FAMILY MEDICINE

## 2022-05-17 PROCEDURE — 82947 ASSAY GLUCOSE BLOOD QUANT: CPT | Performed by: FAMILY MEDICINE

## 2022-05-17 PROCEDURE — 83036 HEMOGLOBIN GLYCOSYLATED A1C: CPT | Performed by: FAMILY MEDICINE

## 2022-05-17 PROCEDURE — G0439 PPPS, SUBSEQ VISIT: HCPCS | Performed by: FAMILY MEDICINE

## 2022-05-17 PROCEDURE — 99213 OFFICE O/P EST LOW 20 MIN: CPT | Mod: 25 | Performed by: FAMILY MEDICINE

## 2022-05-17 PROCEDURE — 36415 COLL VENOUS BLD VENIPUNCTURE: CPT | Performed by: FAMILY MEDICINE

## 2022-05-17 RX ORDER — EPINEPHRINE 0.3 MG/.3ML
0.3 INJECTION SUBCUTANEOUS PRN
Qty: 0.6 ML | Refills: 1 | Status: SHIPPED | OUTPATIENT
Start: 2022-05-17 | End: 2024-01-19

## 2022-05-17 ASSESSMENT — PAIN SCALES - GENERAL: PAINLEVEL: NO PAIN (0)

## 2022-05-17 NOTE — NURSING NOTE
Prior to immunization administration, verified patients identity using patient s name and date of birth. Please see Immunization Activity for additional information.     Screening Questionnaire for Adult Immunization    Are you sick today?   No   Do you have allergies to medications, food, a vaccine component or latex?   Yes   Have you ever had a serious reaction after receiving a vaccination?   No   Do you have a long-term health problem with heart, lung, kidney, or metabolic disease (e.g., diabetes), asthma, a blood disorder, no spleen, complement component deficiency, a cochlear implant, or a spinal fluid leak?  Are you on long-term aspirin therapy?   No   Do you have cancer, leukemia, HIV/AIDS, or any other immune system problem?   No   Do you have a parent, brother, or sister with an immune system problem?   No   In the past 3 months, have you taken medications that affect  your immune system, such as prednisone, other steroids, or anticancer drugs; drugs for the treatment of rheumatoid arthritis, Crohn s disease, or psoriasis; or have you had radiation treatments?   Yes   Have you had a seizure, or a brain or other nervous system problem?   No   During the past year, have you received a transfusion of blood or blood    products, or been given immune (gamma) globulin or antiviral drug?   No   For women: Are you pregnant or is there a chance you could become       pregnant during the next month?   No   Have you received any vaccinations in the past 4 weeks?   No     Immunization questionnaire was positive for at least one answer.  Notified PROVIDER AWARE.        Patient instructed to remain in clinic for 15 minutes afterwards, and to report any adverse reaction to me immediately.       Screening performed by Aster Roy MA on 5/17/2022 at 11:24 AM.

## 2022-05-17 NOTE — PROGRESS NOTES
SUBJECTIVE:   Jhoana Hernandez is a 67 year old female who presents for Preventive Visit.      Patient has been advised of split billing requirements and indicates understanding: Yes  Are you in the first 12 months of your Medicare coverage?  No    HPI  Do you feel safe in your environment? Yes    Have you ever done Advance Care Planning? (For example, a Health Directive, POLST, or a discussion with a medical provider or your loved ones about your wishes): Yes, patient states has an Advance Care Planning document and will bring a copy to the clinic.    Hearing Test Done: Yes recently  Fall risk:  Fallen 2 or more times in the past year?: No  Any fall with injury in the past year?: No    Cognitive Screening   1) Repeat 3 items (Leader, Season, Table)    2) Clock draw: NORMAL  3) 3 item recall: Recalls 3 objects  Results: 3 items recalled: COGNITIVE IMPAIRMENT LESS LIKELY    Mini-CogTM Copyright JANINA Richey. Licensed by the author for use in [facility]; reprinted with permission (rasheed@[facility]). All rights reserved.      Do you have sleep apnea, excessive snoring or daytime drowsiness?: no    Past medical, family, and social histories, medications, and allergies are reviewed and updated in Epic.     Social History     Tobacco Use     Smoking status: Never Smoker     Smokeless tobacco: Never Used   Substance Use Topics     Alcohol use: Yes     Comment: occassionally     If you drink alcohol do you typically have >3 drinks per day or >7 drinks per week? No    Alcohol Use 5/17/2022   Prescreen: >3 drinks/day or >7 drinks/week? -   Prescreen: >3 drinks/day or >7 drinks/week? No           Current providers sharing in care for this patient include:   Patient Care Team:  Nelson Camp MD as PCP - General (Family Practice)  Ina Porras MD as Assigned Musculoskeletal Provider  Nelson Camp MD as Assigned PCP  Reggie Lorenz MD as Assigned Neuroscience Provider  Adrian Lagunas MD  "as Assigned Surgical Provider    The following health maintenance items are reviewed in Epic and correct as of today:  Health Maintenance Due   Topic Date Due     Pneumococcal Vaccine: 65+ Years (2 - PPSV23 or PCV20) 09/09/2020     FALL RISK ASSESSMENT  04/09/2022     MAMMO SCREENING  04/16/2022         Any new diagnosis of family breast, ovarian, or bowel cancer? No  Nothing since 4/9/21  FHS-7:   Breast CA Risk Assessment (FHS-7) 4/9/2021   Did any of your first-degree relatives have breast or ovarian cancer? Yes   Did any of your relatives have bilateral breast cancer? No   Did any man in your family have breast cancer? No   Did any woman in your family have breast and ovarian cancer? No   Did any woman in your family have breast cancer before age 50 y? No   Do you have 2 or more relatives with breast and/or ovarian cancer? No   Do you have 2 or more relatives with breast and/or bowel cancer? No         Pertinent mammograms are reviewed under the imaging tab.    Review of Systems      OBJECTIVE:   /87 (BP Location: Left arm, Patient Position: Sitting, Cuff Size: Adult Regular)   Pulse 90   Temp 97.7  F (36.5  C) (Tympanic)   Resp 20   Ht 1.623 m (5' 3.9\")   Wt 61.7 kg (136 lb)   SpO2 99%   BMI 23.42 kg/m   Estimated body mass index is 23.42 kg/m  as calculated from the following:    Height as of this encounter: 1.623 m (5' 3.9\").    Weight as of this encounter: 61.7 kg (136 lb).  Physical Exam  GENERAL APPEARANCE: healthy, alert and no distress  EYES: Eyes grossly normal to inspection, PERRL and conjunctivae and sclerae normal  HENT: ear canals and TM's normal, nose and mouth without ulcers or lesions, oropharynx clear and oral mucous membranes moist  NECK: no adenopathy, no asymmetry, masses, or scars and thyroid normal to palpation  RESP: lungs clear to auscultation - no rales, rhonchi or wheezes  BREAST: normal without masses, tenderness or nipple discharge and no palpable axillary masses or " "adenopathy  CV: regular rate and rhythm, normal S1 S2, no S3 or S4, no murmur, click or rub, no peripheral edema and peripheral pulses strong  ABDOMEN: soft, nontender, no hepatosplenomegaly, no masses and bowel sounds normal  MS: no musculoskeletal defects are noted and gait is age appropriate without ataxia  SKIN: no suspicious lesions or rashes  NEURO: Normal strength and tone, sensory exam grossly normal, mentation intact and speech normal.  Shoulder twitch as noted previously  PSYCH: mentation appears normal and affect normal/bright        ASSESSMENT / PLAN:   (Z00.00) Encounter for Medicare annual wellness exam  (primary encounter diagnosis)  Comment:   Plan: Lipid panel reflex to direct LDL Fasting,         PNEUMOCOCCAL 23 VALENT        Follow-up in 1 year    (E03.8) Subclinical hypothyroidism  Comment:   Plan: TSH with free T4 reflex        Monitor periodically    (Z91.018) Allergy, food  Comment: Refill request  Plan: EPINEPHrine (ANY BX GENERIC EQUIV) 0.3 MG/0.3ML        injection 2-pack, Adult Allergy/Asthma Referral          (J30.1) Seasonal allergic rhinitis due to pollen  Comment:   Plan: Discussed OTC medication    (Z86.39) History of elevated glucose  (Z13.1) Screening for diabetes mellitus  Comment:   Plan: Glucose, Hemoglobin A1c            (Z80.3) Family history of breast cancer  (Z12.31) Breast cancer screening by mammogram  Comment:   Plan: [place] Screen Bilateral w/[place]            (Z23) Need for vaccination  Comment:   Plan: PNEUMOCOCCAL 23 VALENT                COUNSELING:  Reviewed preventive health counseling, as reflected in patient instructions  Special attention given to:       Immunizations    Vaccinated for: Pneumococcal             Colon cancer screening       Breast cancer screening    Estimated body mass index is 23.42 kg/m  as calculated from the following:    Height as of this encounter: 1.623 m (5' 3.9\").    Weight as of this encounter: 61.7 kg (136 lb).        She reports that she " has never smoked. She has never used smokeless tobacco.      Appropriate preventive services were discussed with this patient, including applicable screening as appropriate for cardiovascular disease, diabetes, osteopenia/osteoporosis, and glaucoma.  As appropriate for age/gender, discussed screening for colorectal cancer, prostate cancer, breast cancer, and cervical cancer. Checklist reviewing preventive services available has been given to the patient.    Reviewed patients plan of care and provided an AVS. The Basic Care Plan (routine screening as documented in Health Maintenance) for Jhoana meets the Care Plan requirement. This Care Plan has been established and reviewed with the Patient.    Counseling Resources:  ATP IV Guidelines  Pooled Cohorts Equation Calculator  Breast Cancer Risk Calculator  Breast Cancer: Medication to Reduce Risk  FRAX Risk Assessment  ICSI Preventive Guidelines  Dietary Guidelines for Americans, 2010  USDA's MyPlate  ASA Prophylaxis  Lung CA Screening    Nelson Camp MD  [facility]    Identified Health Risks:

## 2022-06-07 ENCOUNTER — ANCILLARY PROCEDURE (OUTPATIENT)
Dept: MAMMOGRAPHY | Facility: CLINIC | Age: 67
End: 2022-06-07
Attending: FAMILY MEDICINE
Payer: MEDICARE

## 2022-06-07 DIAGNOSIS — Z12.31 BREAST CANCER SCREENING BY MAMMOGRAM: ICD-10-CM

## 2022-06-07 DIAGNOSIS — Z80.3 FAMILY HISTORY OF BREAST CANCER: ICD-10-CM

## 2022-06-07 PROCEDURE — 77067 SCR MAMMO BI INCL CAD: CPT | Performed by: STUDENT IN AN ORGANIZED HEALTH CARE EDUCATION/TRAINING PROGRAM

## 2022-06-07 PROCEDURE — 77063 BREAST TOMOSYNTHESIS BI: CPT | Performed by: STUDENT IN AN ORGANIZED HEALTH CARE EDUCATION/TRAINING PROGRAM

## 2022-08-08 ENCOUNTER — OFFICE VISIT (OUTPATIENT)
Dept: URGENT CARE | Facility: URGENT CARE | Age: 67
End: 2022-08-08
Payer: MEDICARE

## 2022-08-08 VITALS
SYSTOLIC BLOOD PRESSURE: 160 MMHG | HEART RATE: 69 BPM | WEIGHT: 131.6 LBS | DIASTOLIC BLOOD PRESSURE: 94 MMHG | OXYGEN SATURATION: 100 % | TEMPERATURE: 97.7 F | BODY MASS INDEX: 22.66 KG/M2

## 2022-08-08 DIAGNOSIS — H92.02 OTALGIA, LEFT: ICD-10-CM

## 2022-08-08 DIAGNOSIS — H61.22 IMPACTED CERUMEN OF LEFT EAR: Primary | ICD-10-CM

## 2022-08-08 DIAGNOSIS — R03.0 ELEVATED BLOOD PRESSURE READING WITHOUT DIAGNOSIS OF HYPERTENSION: ICD-10-CM

## 2022-08-08 PROCEDURE — 69210 REMOVE IMPACTED EAR WAX UNI: CPT | Mod: LT | Performed by: EMERGENCY MEDICINE

## 2022-08-08 PROCEDURE — 99213 OFFICE O/P EST LOW 20 MIN: CPT | Mod: 25 | Performed by: EMERGENCY MEDICINE

## 2022-08-08 NOTE — PROGRESS NOTES
Assessment & Plan     Diagnosis:    (H61.22) Impacted cerumen of left ear  (primary encounter diagnosis)    (H92.02) Otalgia, left    (R03.0) Elevated blood pressure reading without diagnosis of hypertension  Comment: Serial monitoring; follow up with PCP for recheck and management.      Medical Decision Making:  Jhoana Hernandez is a 67 year old female who presents for evaluation of otalgia on the left side.    Differential considered in this patient with otalgia included mastoiditis, meningitis, perforation, cerumen impaction, mass, dental abscess, or peritonsillar abscess, referred pain, cholesteatoma, otitis externa, etc. The patient has an exam consistent with cerumen impaction; this was cleared via procedure note below and irrigation.  She may take Tylenol or Ibuprofen or auralgan for pain.  Return if increasing pain, fever, decrease in hearing or ear discharge.  Follow-up with primary physician in 7-10 days, if symptoms persist then follow-up with ENT, she notes she has an appointment in 1 month.  Discussed reasons to return sooner.  All questions answered.    Rolando Alston PA-C  [facility]    Subjective     Jhoana Hernandez is a 67 year old female who presents to clinic today for the following health issues:  Chief Complaint   Patient presents with     Otitis Media     Pt  having pain in both ears for 2 weeks, worsening in left ear, pt normally sees ENT unable to get into clinic       HPI    Onset of symptoms was 2 week(s) ago.  Course of illness is worsening.    Severity moderate  Current and Associated symptoms: runny nose, stuffy nose and ear pain left and muffled hearing.  Denies fever, cough - non-productive, ear drainage bilateral, sore throat and hoarse voice  Treatment measures tried include Decongestants and Antihistamine and Prednisolone drops in the left ear prescribed by her ENT.  Predisposing factors include HX of recurrent ear problems. Notes small canals and  frequent cerumen impaction and eustachian tube dysfunction.      Review of Systems    See HPI    Objective      Vitals: BP (!) 160/94 (BP Location: Left arm, Patient Position: Sitting, Cuff Size: Adult Small)   Pulse 69   Temp 97.7  F (36.5  C) (Oral)   Wt 59.7 kg (131 lb 9.6 oz)   SpO2 100%   BMI 22.66 kg/m    Patient Vitals for the past 24 hrs:   BP Temp Temp src Pulse SpO2 Weight   08/08/22 1108 (!) 160/94 97.7  F (36.5  C) Oral 69 100 % 59.7 kg (131 lb 9.6 oz)       Vital signs reviewed by: Rolando Alston PA-C    Physical Exam   Constitutional: Alert and active. With caregiver; in no acute distress.  HENT: Ears: Left ear canal/TM is obstructed by cerumen. Right TM is pale/grey. Right  external ear canal and auricles are normal. No tenderness with manipulation of the pinnae and tragus. No mastoid tenderness bilaterally.  Nose: Nose normal.    Mouth: Normal tongue and tonsil. Posterior oropharynx is clear. Uvula is midline.  Skin: No rash noted on visualized skin or face.      Procedure:  PROCEDURE NOTE: Cerumen disimpaction  Provider: Rolando Alston PA-C  Indications: Otalgia (left ear), cerumen disimpaction  Consent: Verbal  Description of Procedure:  The patient was placed in the supine position.  Using direct visualization the cerumen  was slowly removed piecemeal from the ear with a soft-looped plastic curette.  Patients hearing was improved.  I was able to get most of the wax removed; the remainder abutting the TM was flushed with irrigation by the MA.  The TM was slightly erythematous; non-bulging. No complications. Patient tolerated procedure well.    Rolando Alston PA-C, August 8, 2022

## 2022-09-15 NOTE — PROGRESS NOTES
"History of Present Illness - Jhoana Hernandez is a 67 year old female last seen on 5/2/2022    To review, for many years she has noted a sense of fullness and itching in the ears. She has tried various OTC drops before, but nothing seems to help.  She has never had ear surgery or chronic ear disease.  However, she notes that there seems to be a routine event every fall and spring where her ears seem more stuffy and full.  On exam, after debriding out the canals, her exam strongly suggested chronic eczematous otitis externa.  I would have liked to have used dermotic, but she has a severe anaphylactic nut allergy and therefore I had her use more home treatments like olive oil.  She tells me that it has seemed to help.  Her ears will get stuffy at times.    Also, she had complaints of some dull aching and fullness around the ears that would come and go.  She wears invisalign braces, and felt that this was temporomandibular disease.  I gave her a home therapy sheet.  And at the 9/30/13 visit, this problem had settled down just fine.  She had returned 4/12/16 due to a recurrence of ear pain.  This started about a month prior and she was diagnosed with otitis media and sinusitis.  Antibiotics were given, and things improved.    With regards to her temporomandibular disease, she does tell me that she notes that when she skips wearing her invisalign brace overnight the ear and facial aches do get better.    She has returned primarily to have her ears checked because of fullness since she ran out of prednisolone drops for the ears, more in the LEFT ear.    The main reason for the May 2022 follow up was her nose and throat feel congested, possibly due to her seasonal allergies, which are again worse this year.  But she also notes that about 2 weeks ago she acutely had pressure and \"burning\" in the head, and assumed it was sinusitis.  She was seen in urgent care, and was given a Z Pack which helped a bit.  She went back " to urgent care and was treated with Augmentin and that gave much more relief.      She takes as needed claritin, Nasonex, and even benadryl when things are really bad. But stopped the medications while this recent episode was happening.  She is still having post nasal drainage, congestion.    She is here for follow up on the nasal and sinus issues, but also possible cerumen impaction.  She mentions that soon after seeing me in May 2022, her nasal issues cleared up quickly without medication.  She also mentions that she had an episode of eating thin mint girl  cookies and almost had anaphylaxis.  She figured out that canola and palm oil do it to her.  Since totally eliminating those things she is having much less symptoms.      Past Medical History -   Patient Active Problem List   Diagnosis     CARDIOVASCULAR SCREENING; LDL GOAL LESS THAN 160     Lactose intolerance     Seasonal allergic rhinitis     Allergy, food     Advanced directives, counseling/discussion     Subclinical hypothyroidism     Osteopenia     Hollenhorst plaque, left eye     Family history of breast cancer     Chronic eczematous otitis externa of both ears       Current Medications -   Current Outpatient Medications:      azelastine (ASTEPRO) 0.15 % nasal spray, Spray 2 sprays into both nostrils 2 times daily, Disp: 30 mL, Rfl: 11     diphenhydrAMINE (BENADRYL) 25 MG tablet, Take 25 mg by mouth every 6 hours as needed for itching or allergies, Disp: , Rfl:      EPINEPHrine (ANY BX GENERIC EQUIV) 0.3 MG/0.3ML injection 2-pack, Inject 0.3 mLs (0.3 mg) into the muscle as needed for anaphylaxis (or exposure to trigger), Disp: 0.6 mL, Rfl: 1     hydrocortisone 2.5 % cream, Apply sparingly to affected area 2 times daily for no more than 14 days, Disp: 30 g, Rfl: 0     loratadine (CLARITIN) 10 MG tablet, Take 10 mg by mouth daily as needed for allergies, Disp: , Rfl:      mometasone (NASONEX) 50 MCG/ACT nasal spray, Spray 2 sprays into both nostrils  daily for allergy prevention., Disp: 17 g, Rfl: 11     prednisoLONE acetate (PRED FORTE) 1 % ophthalmic suspension, 4 drops into both ears twice as needed, Disp: 10 mL, Rfl: 11     predniSONE (DELTASONE) 10 MG tablet, 10mg twice daily for 7 days, then 10mg daily for 7 days, then stop (Patient not taking: No sig reported), Disp: 21 tablet, Rfl: 2    Allergies -   Allergies   Allergen Reactions     Docosahexaenoic Acid-Epa Anaphylaxis     Fish Anaphylaxis     Nuts Anaphylaxis     Palmarosa Oil Shortness Of Breath     Shellfish Allergy Anaphylaxis     Aloe Hives     Aloe Vera      Canola Oil [Vegetable Oil] Other (See Comments)     Sneezing     Coconut Oil Other (See Comments)     Fish      Fluticasone      Food Hives     Hydrogenated Palm Oil Glycerides Other (See Comments)     Ibuprofen Sodium      Macrodantin [Nitrofuran Derivatives]      Mineral Oil Itching     Peanut (Diagnostic)      Seafood      Sulfa Drugs      Yellow Dye      Yellow Dyes Itching     Ibuprofen Other (See Comments)     Jittery, Chest pain, SOB, Congestion, Dizziness       Social History -   History     Social History     Marital Status:      Spouse Name: Bruno     Number of Children: 2     Years of Education: N/A     Occupational History      shop       [employer]      Social History Main Topics     Smoking status: Never Smoker      Smokeless tobacco: Never Used     Alcohol Use: Yes      Comment: occassionally     Drug Use: No     Sexual Activity:     Partners: Male     Other Topics Concern     Parent/Sibling W/ Cabg, Mi Or Angioplasty Before 65f 55m? No     Social History Narrative       Family History -   Family History   Problem Relation Age of Onset     Asthma Sister      Thyroid Disease Sister      Lipids Mother      Food Allergy Mother      Thyroid Disease Mother      Cerebrovascular Disease Father      Asthma Father      Thyroid Disease Brother      Diabetes Type 2  Sister      Breast Cancer Sister 60     Thyroid  Disease Sister      Graves' disease Sister      Thyroid Disease Sister      Other - See Comments Son      Other - See Comments Son      C.A.CAROL. No family hx of      Hypertension No family hx of      Cancer - colorectal No family hx of      Prostate Cancer No family hx of        Review of Systems - As per HPI and PMHx, otherwise 7 system review of the head and neck negative.    Physical Exam  BP (!) 145/88 (BP Location: Left arm)   Pulse 78   Wt 59.4 kg (131 lb)   SpO2 100%   BMI 22.56 kg/m      General - The patient is well nourished and well developed, and appears to have good nutritional status.  Alert and oriented to person and place, answers questions and cooperates with examination appropriately.   Head and Face - Normocephalic and atraumatic, with no gross asymmetry noted of the contour of the facial features.  The facial nerve is intact, with strong symmetric movements.  Voice and Breathing - The patient was breathing comfortably without the use of accessory muscles. There was no wheezing, stridor, or stertor.  The patients voice was clear and strong, and had appropriate pitch and quality.  Ears - A moderate amount of cerumen was found bilaterally and easily removed with curette. The tympanic membranes are normal in appearance, bony landmarks are intact.  No retraction, perforation, or masses.  Normal mobility on valsalva maneuver, with no reports of dizziness on insuflation.  No fluid or purulence was seen in the external canal or the middle ear. No evidence of infection of the middle ear or external canal, cerumen was normal in appearance.    Eyes - Extraocular movements intact, and the pupils were reactive to light.  Sclera were not icteric or injected, conjunctiva were pink and moist.  Mouth - Examination of the oral cavity showed pink, healthy oral mucosa. No lesions or ulcerations noted.  The tongue was mobile and midline, and the dentition were in good condition.          A/P - Jhoana Beastrom Stemm  is a 67 year old female  (J01.41) Acute recurrent pansinusitis  (primary encounter diagnosis)  (J30.1) Seasonal allergic rhinitis due to pollen  (H61.23) Bilateral impacted cerumen    Her ears are in good shape, follow up in the usual 6 months for an ear check and cleaning.    Her allergic rhinitis is now under much better control wit strict food avodiance.

## 2022-09-19 ENCOUNTER — OFFICE VISIT (OUTPATIENT)
Dept: OTOLARYNGOLOGY | Facility: CLINIC | Age: 67
End: 2022-09-19
Payer: MEDICARE

## 2022-09-19 VITALS
SYSTOLIC BLOOD PRESSURE: 145 MMHG | HEART RATE: 78 BPM | OXYGEN SATURATION: 100 % | BODY MASS INDEX: 22.56 KG/M2 | DIASTOLIC BLOOD PRESSURE: 88 MMHG | WEIGHT: 131 LBS

## 2022-09-19 DIAGNOSIS — J01.41 ACUTE RECURRENT PANSINUSITIS: Primary | ICD-10-CM

## 2022-09-19 DIAGNOSIS — H61.23 BILATERAL IMPACTED CERUMEN: ICD-10-CM

## 2022-09-19 DIAGNOSIS — J30.1 SEASONAL ALLERGIC RHINITIS DUE TO POLLEN: ICD-10-CM

## 2022-09-19 PROCEDURE — 99213 OFFICE O/P EST LOW 20 MIN: CPT | Performed by: OTOLARYNGOLOGY

## 2022-09-19 NOTE — LETTER
9/19/2022         RE: Jhoana Hernandez  [address] [place] [address]        Dear Colleague,    Thank you for referring your patient, Jhoana Hernandez, to the [facility]. Please see a copy of my visit note below.    History of Present Illness - Jhoana Hernandez is a 67 year old female last seen on 5/2/2022    To review, for many years she has noted a sense of fullness and itching in the ears. She has tried various OTC drops before, but nothing seems to help.  She has never had ear surgery or chronic ear disease.  However, she notes that there seems to be a routine event every fall and spring where her ears seem more stuffy and full.  On exam, after debriding out the canals, her exam strongly suggested chronic eczematous otitis externa.  I would have liked to have used dermotic, but she has a severe anaphylactic nut allergy and therefore I had her use more home treatments like olive oil.  She tells me that it has seemed to help.  Her ears will get stuffy at times.    Also, she had complaints of some dull aching and fullness around the ears that would come and go.  She wears invisalign braces, and felt that this was temporomandibular disease.  I gave her a home therapy sheet.  And at the 9/30/13 visit, this problem had settled down just fine.  She had returned 4/12/16 due to a recurrence of ear pain.  This started about a month prior and she was diagnosed with otitis media and sinusitis.  Antibiotics were given, and things improved.    With regards to her temporomandibular disease, she does tell me that she notes that when she skips wearing her invisalign brace overnight the ear and facial aches do get better.    She has returned primarily to have her ears checked because of fullness since she ran out of prednisolone drops for the ears, more in the LEFT ear.    The main reason for the May 2022 follow up was her nose and throat feel congested, possibly due to her  "seasonal allergies, which are again worse this year.  But she also notes that about 2 weeks ago she acutely had pressure and \"burning\" in the head, and assumed it was sinusitis.  She was seen in urgent care, and was given a Z Pack which helped a bit.  She went back to urgent care and was treated with Augmentin and that gave much more relief.      She takes as needed claritin, Nasonex, and even benadryl when things are really bad. But stopped the medications while this recent episode was happening.  She is still having post nasal drainage, congestion.    She is here for follow up on the nasal and sinus issues, but also possible cerumen impaction.  She mentions that soon after seeing me in May 2022, her nasal issues cleared up quickly without medication.  She also mentions that she had an episode of eating thin mint girl  cookies and almost had anaphylaxis.  She figured out that canola and palm oil do it to her.  Since totally eliminating those things she is having much less symptoms.      Past Medical History -   Patient Active Problem List   Diagnosis     CARDIOVASCULAR SCREENING; LDL GOAL LESS THAN 160     Lactose intolerance     Seasonal allergic rhinitis     Allergy, food     Advanced directives, counseling/discussion     Subclinical hypothyroidism     Osteopenia     Hollenhorst plaque, left eye     Family history of breast cancer     Chronic eczematous otitis externa of both ears       Current Medications -   Current Outpatient Medications:      azelastine (ASTEPRO) 0.15 % nasal spray, Spray 2 sprays into both nostrils 2 times daily, Disp: 30 mL, Rfl: 11     diphenhydrAMINE (BENADRYL) 25 MG tablet, Take 25 mg by mouth every 6 hours as needed for itching or allergies, Disp: , Rfl:      EPINEPHrine (ANY BX GENERIC EQUIV) 0.3 MG/0.3ML injection 2-pack, Inject 0.3 mLs (0.3 mg) into the muscle as needed for anaphylaxis (or exposure to trigger), Disp: 0.6 mL, Rfl: 1     hydrocortisone 2.5 % cream, Apply sparingly " to affected area 2 times daily for no more than 14 days, Disp: 30 g, Rfl: 0     loratadine (CLARITIN) 10 MG tablet, Take 10 mg by mouth daily as needed for allergies, Disp: , Rfl:      mometasone (NASONEX) 50 MCG/ACT nasal spray, Spray 2 sprays into both nostrils daily for allergy prevention., Disp: 17 g, Rfl: 11     prednisoLONE acetate (PRED FORTE) 1 % ophthalmic suspension, 4 drops into both ears twice as needed, Disp: 10 mL, Rfl: 11     predniSONE (DELTASONE) 10 MG tablet, 10mg twice daily for 7 days, then 10mg daily for 7 days, then stop (Patient not taking: No sig reported), Disp: 21 tablet, Rfl: 2    Allergies -   Allergies   Allergen Reactions     Docosahexaenoic Acid-Epa Anaphylaxis     Fish Anaphylaxis     Nuts Anaphylaxis     Palmarosa Oil Shortness Of Breath     Shellfish Allergy Anaphylaxis     Aloe Hives     Aloe Vera      Canola Oil [Vegetable Oil] Other (See Comments)     Sneezing     Coconut Oil Other (See Comments)     Fish      Fluticasone      Food Hives     Hydrogenated Palm Oil Glycerides Other (See Comments)     Ibuprofen Sodium      Macrodantin [Nitrofuran Derivatives]      Mineral Oil Itching     Peanut (Diagnostic)      Seafood      Sulfa Drugs      Yellow Dye      Yellow Dyes Itching     Ibuprofen Other (See Comments)     Jittery, Chest pain, SOB, Congestion, Dizziness       Social History -   History     Social History     Marital Status:      Spouse Name: Bruno     Number of Children: 2     Years of Education: N/A     Occupational History      shop       [employer]      Social History Main Topics     Smoking status: Never Smoker      Smokeless tobacco: Never Used     Alcohol Use: Yes      Comment: occassionally     Drug Use: No     Sexual Activity:     Partners: Male     Other Topics Concern     Parent/Sibling W/ Cabg, Mi Or Angioplasty Before 65f 55m? No     Social History Narrative       Family History -   Family History   Problem Relation Age of Onset      Asthma Sister      Thyroid Disease Sister      Lipids Mother      Food Allergy Mother      Thyroid Disease Mother      Cerebrovascular Disease Father      Asthma Father      Thyroid Disease Brother      Diabetes Type 2  Sister      Breast Cancer Sister 60     Thyroid Disease Sister      Graves' disease Sister      Thyroid Disease Sister      Other - See Comments Son      Other - See Comments Son      C.A.D. No family hx of      Hypertension No family hx of      Cancer - colorectal No family hx of      Prostate Cancer No family hx of        Review of Systems - As per HPI and PMHx, otherwise 7 system review of the head and neck negative.    Physical Exam  BP (!) 145/88 (BP Location: Left arm)   Pulse 78   Wt 59.4 kg (131 lb)   SpO2 100%   BMI 22.56 kg/m      General - The patient is well nourished and well developed, and appears to have good nutritional status.  Alert and oriented to person and place, answers questions and cooperates with examination appropriately.   Head and Face - Normocephalic and atraumatic, with no gross asymmetry noted of the contour of the facial features.  The facial nerve is intact, with strong symmetric movements.  Voice and Breathing - The patient was breathing comfortably without the use of accessory muscles. There was no wheezing, stridor, or stertor.  The patients voice was clear and strong, and had appropriate pitch and quality.  Ears - A moderate amount of cerumen was found bilaterally and easily removed with curette. The tympanic membranes are normal in appearance, bony landmarks are intact.  No retraction, perforation, or masses.  Normal mobility on valsalva maneuver, with no reports of dizziness on insuflation.  No fluid or purulence was seen in the external canal or the middle ear. No evidence of infection of the middle ear or external canal, cerumen was normal in appearance.    Eyes - Extraocular movements intact, and the pupils were reactive to light.  Sclera were not icteric  or injected, conjunctiva were pink and moist.  Mouth - Examination of the oral cavity showed pink, healthy oral mucosa. No lesions or ulcerations noted.  The tongue was mobile and midline, and the dentition were in good condition.          MEMO/P - Jhoana Hernandez is a 67 year old female  (J01.41) Acute recurrent pansinusitis  (primary encounter diagnosis)  (J30.1) Seasonal allergic rhinitis due to pollen  (H61.23) Bilateral impacted cerumen    Her ears are in good shape, follow up in the usual 6 months for an ear check and cleaning.    Her allergic rhinitis is now under much better control wit strict food avodiance.        Again, thank you for allowing me to participate in the care of your patient.        Sincerely,        Hussein Pepper MD

## 2022-09-19 NOTE — NURSING NOTE
"Chief Complaint   Patient presents with     Cerumen Impaction       Vitals:    09/19/22 1034   BP: (!) 145/88   BP Location: Left arm   Pulse: 78   SpO2: 100%   Weight: 59.4 kg (131 lb)     Wt Readings from Last 1 Encounters:   09/19/22 59.4 kg (131 lb)     Ht Readings from Last 1 Encounters:   05/17/22 1.623 m (5' 3.9\")       Joon Terrazas [facility], 9/19/2022 10:35 AM    "

## 2022-11-14 ENCOUNTER — IMMUNIZATION (OUTPATIENT)
Dept: NURSING | Facility: CLINIC | Age: 67
End: 2022-11-14
Payer: MEDICARE

## 2022-11-14 PROCEDURE — 91312 COVID-19,PF,PFIZER BOOSTER BIVALENT: CPT

## 2022-11-14 PROCEDURE — 0124A COVID-19,PF,PFIZER BOOSTER BIVALENT: CPT

## 2022-11-19 ENCOUNTER — HEALTH MAINTENANCE LETTER (OUTPATIENT)
Age: 67
End: 2022-11-19

## 2023-01-05 NOTE — PROGRESS NOTES
"History of Present Illness - Jhoana Hernandez is a 67 year old female last seen on 9/19/2022    To review, for many years she has noted a sense of fullness and itching in the ears. She has tried various OTC drops before, but nothing seems to help.  She has never had ear surgery or chronic ear disease.  However, she notes that there seems to be a routine event every fall and spring where her ears seem more stuffy and full.  On exam, after debriding out the canals, her exam strongly suggested chronic eczematous otitis externa.  I would have liked to have used dermotic, but she has a severe anaphylactic nut allergy and therefore I had her use more home treatments like olive oil.  She tells me that it has seemed to help.  Her ears will get stuffy at times.    Also, she had complaints of some dull aching and fullness around the ears that would come and go.  She wears invisalign braces, and felt that this was temporomandibular disease.  I gave her a home therapy sheet.  And at the 9/30/13 visit, this problem had settled down just fine.  She had returned 4/12/16 due to a recurrence of ear pain.  This started about a month prior and she was diagnosed with otitis media and sinusitis.  Antibiotics were given, and things improved.    With regards to her temporomandibular disease, she does tell me that she notes that when she skips wearing her invisalign brace overnight the ear and facial aches do get better.    She has returned primarily to have her ears checked because of fullness since she ran out of prednisolone drops for the ears, more in the LEFT ear.    The main reason for the May 2022 follow up was her nose and throat feel congested, possibly due to her seasonal allergies, which are again worse this year.  But she also notes that about 2 weeks ago she acutely had pressure and \"burning\" in the head, and assumed it was sinusitis.  She was seen in urgent care, and was given a Z Pack which helped a bit.  She went back " to urgent care and was treated with Augmentin and that gave much more relief.      She takes as needed claritin, Nasonex, and even benadryl when things are really bad. But stopped the medications while this recent episode was happening.  She is still having post nasal drainage, congestion.    She mentions that soon after seeing me in May 2022, her nasal issues cleared up quickly without medication.  She also mentions that she had an episode of eating thin mint girl  cookies and almost had anaphylaxis.  She figured out that canola and palm oil do it to her.  Since totally eliminating those things she is having much less symptoms.    But today she is only here to have routine cerumen removal.    Past Medical History -   Patient Active Problem List   Diagnosis     CARDIOVASCULAR SCREENING; LDL GOAL LESS THAN 160     Lactose intolerance     Seasonal allergic rhinitis     Allergy, food     Advanced directives, counseling/discussion     Subclinical hypothyroidism     Osteopenia     Hollenhorst plaque, left eye     Family history of breast cancer     Chronic eczematous otitis externa of both ears       Current Medications -   Current Outpatient Medications:      azelastine (ASTEPRO) 0.15 % nasal spray, Spray 2 sprays into both nostrils 2 times daily, Disp: 30 mL, Rfl: 11     diphenhydrAMINE (BENADRYL) 25 MG tablet, Take 25 mg by mouth every 6 hours as needed for itching or allergies, Disp: , Rfl:      EPINEPHrine (ANY BX GENERIC EQUIV) 0.3 MG/0.3ML injection 2-pack, Inject 0.3 mLs (0.3 mg) into the muscle as needed for anaphylaxis (or exposure to trigger), Disp: 0.6 mL, Rfl: 1     hydrocortisone 2.5 % cream, Apply sparingly to affected area 2 times daily for no more than 14 days, Disp: 30 g, Rfl: 0     loratadine (CLARITIN) 10 MG tablet, Take 10 mg by mouth daily as needed for allergies, Disp: , Rfl:      mometasone (NASONEX) 50 MCG/ACT nasal spray, Spray 2 sprays into both nostrils daily for allergy prevention.,  Disp: 17 g, Rfl: 11     prednisoLONE acetate (PRED FORTE) 1 % ophthalmic suspension, 4 drops into both ears twice as needed, Disp: 10 mL, Rfl: 11     predniSONE (DELTASONE) 10 MG tablet, 10mg twice daily for 7 days, then 10mg daily for 7 days, then stop, Disp: 21 tablet, Rfl: 2    Allergies -   Allergies   Allergen Reactions     Docosahexaenoic Acid-Epa Anaphylaxis     Fish Anaphylaxis     Nuts Anaphylaxis     Palmarosa Oil Shortness Of Breath     Shellfish Allergy Anaphylaxis     Aloe Hives     Aloe Vera      Canola Oil [Vegetable Oil] Other (See Comments)     Sneezing     Coconut Oil Other (See Comments)     Fish      Fluticasone      Food Hives     Hydrogenated Palm Oil Glycerides Other (See Comments)     Ibuprofen Sodium      Macrodantin [Nitrofuran Derivatives]      Mineral Oil Itching     Peanut (Diagnostic)      Seafood      Sulfa Drugs      Yellow Dye      Yellow Dyes Itching     Ibuprofen Other (See Comments)     Jittery, Chest pain, SOB, Congestion, Dizziness       Social History -   History     Social History     Marital Status:      Spouse Name: Bruno     Number of Children: 2     Years of Education: N/A     Occupational History      shop       [employer]      Social History Main Topics     Smoking status: Never Smoker      Smokeless tobacco: Never Used     Alcohol Use: Yes      Comment: occassionally     Drug Use: No     Sexual Activity:     Partners: Male     Other Topics Concern     Parent/Sibling W/ Cabg, Mi Or Angioplasty Before 65f 55m? No     Social History Narrative       Family History -   Family History   Problem Relation Age of Onset     Asthma Sister      Thyroid Disease Sister      Lipids Mother      Food Allergy Mother      Thyroid Disease Mother      Cerebrovascular Disease Father      Asthma Father      Thyroid Disease Brother      Diabetes Type 2  Sister      Breast Cancer Sister 60     Thyroid Disease Sister      Graves' disease Sister      Thyroid Disease  Sister      Other - See Comments Son      Other - See Comments Son      ANAHI No family hx of      Hypertension No family hx of      Cancer - colorectal No family hx of      Prostate Cancer No family hx of        Review of Systems - As per HPI and PMHx, otherwise 7 system review of the head and neck negative.    Physical Exam  /81   Pulse 80   Resp 16   Wt 60.3 kg (133 lb)   SpO2 99%   BMI 22.90 kg/m      General - The patient is well nourished and well developed, and appears to have good nutritional status.  Alert and oriented to person and place, answers questions and cooperates with examination appropriately.   Head and Face - Normocephalic and atraumatic, with no gross asymmetry noted of the contour of the facial features.  The facial nerve is intact, with strong symmetric movements.  Voice and Breathing - The patient was breathing comfortably without the use of accessory muscles. There was no wheezing, stridor, or stertor.  The patients voice was clear and strong, and had appropriate pitch and quality.  Eyes - Extraocular movements intact, and the pupils were reactive to light.  Sclera were not icteric or injected, conjunctiva were pink and moist.  Mouth - Examination of the oral cavity showed pink, healthy oral mucosa. No lesions or ulcerations noted.  The tongue was mobile and midline, and the dentition were in good condition.      Cerumen Removal    Physical Exam and Procedure  Ears - On examination of the ears, I found that the  sides had cerumen impaction.  Therefore, I positioned them in the examination chair in a semi-supine position, beginning with the right side.  I used the binocular surgical microscope to perform cerumen removal.  I began by using a cerumen loop to gently lift the edges of the cerumen mass away from the walls of the external canal.  Once I did this, I was able to suction away fragments of wax and debris using suction.  Once the mass was loose enough, the entire plug was  pulled from the canal.  The tympanic membrane was intact, no sign of perforation or middle ear effusion.    I turned my attention to the contralateral side once again using the binocular surgical microscope to perform cerumen removal.  I began by using a cerumen loop to gently lift the edges of the cerumen mass away from the walls of the external canal.  Once I did this, I was able to suction away fragments of wax and debris using suction.  Once the mass was loose enough, the entire plug was pulled from the canal.  The tympanic membrane was intact, no sign of perforation or middle ear effusion.        A/P - Jhoana Hernandez is a 67 year old female  (J01.41) Acute recurrent pansinusitis  (primary encounter diagnosis)  (J30.1) Seasonal allergic rhinitis due to pollen  (H61.23) Bilateral impacted cerumen    The patient has had their cerumen procedurally removed today.  I have discussed ear care at home, including avoiding qtips or any other object placed in the canal.  I have also discussed that over the counter cerumen kits like Debrox or Cerumenex can be useful.    If no other issues, follow up with me in 6 months

## 2023-01-09 ENCOUNTER — OFFICE VISIT (OUTPATIENT)
Dept: OTOLARYNGOLOGY | Facility: CLINIC | Age: 68
End: 2023-01-09
Payer: MEDICARE

## 2023-01-09 VITALS
SYSTOLIC BLOOD PRESSURE: 133 MMHG | BODY MASS INDEX: 22.9 KG/M2 | OXYGEN SATURATION: 99 % | DIASTOLIC BLOOD PRESSURE: 81 MMHG | WEIGHT: 133 LBS | RESPIRATION RATE: 16 BRPM | HEART RATE: 80 BPM

## 2023-01-09 DIAGNOSIS — H61.23 BILATERAL IMPACTED CERUMEN: Primary | ICD-10-CM

## 2023-01-09 PROCEDURE — 99207 PR DROP WITH A PROCEDURE: CPT | Performed by: OTOLARYNGOLOGY

## 2023-01-09 PROCEDURE — 69210 REMOVE IMPACTED EAR WAX UNI: CPT | Performed by: OTOLARYNGOLOGY

## 2023-01-09 ASSESSMENT — PAIN SCALES - GENERAL: PAINLEVEL: NO PAIN (0)

## 2023-01-09 NOTE — LETTER
1/9/2023         RE: Jhoana Hernandez  [address] [place] [address]        Dear Colleague,    Thank you for referring your patient, Jhoana Hernandez, to the [facility]. Please see a copy of my visit note below.    History of Present Illness - Jhoana Hernandez is a 67 year old female last seen on 9/19/2022    To review, for many years she has noted a sense of fullness and itching in the ears. She has tried various OTC drops before, but nothing seems to help.  She has never had ear surgery or chronic ear disease.  However, she notes that there seems to be a routine event every fall and spring where her ears seem more stuffy and full.  On exam, after debriding out the canals, her exam strongly suggested chronic eczematous otitis externa.  I would have liked to have used dermotic, but she has a severe anaphylactic nut allergy and therefore I had her use more home treatments like olive oil.  She tells me that it has seemed to help.  Her ears will get stuffy at times.    Also, she had complaints of some dull aching and fullness around the ears that would come and go.  She wears invisalign braces, and felt that this was temporomandibular disease.  I gave her a home therapy sheet.  And at the 9/30/13 visit, this problem had settled down just fine.  She had returned 4/12/16 due to a recurrence of ear pain.  This started about a month prior and she was diagnosed with otitis media and sinusitis.  Antibiotics were given, and things improved.    With regards to her temporomandibular disease, she does tell me that she notes that when she skips wearing her invisalign brace overnight the ear and facial aches do get better.    She has returned primarily to have her ears checked because of fullness since she ran out of prednisolone drops for the ears, more in the LEFT ear.    The main reason for the May 2022 follow up was her nose and throat feel congested, possibly due to her  "seasonal allergies, which are again worse this year.  But she also notes that about 2 weeks ago she acutely had pressure and \"burning\" in the head, and assumed it was sinusitis.  She was seen in urgent care, and was given a Z Pack which helped a bit.  She went back to urgent care and was treated with Augmentin and that gave much more relief.      She takes as needed claritin, Nasonex, and even benadryl when things are really bad. But stopped the medications while this recent episode was happening.  She is still having post nasal drainage, congestion.    She mentions that soon after seeing me in May 2022, her nasal issues cleared up quickly without medication.  She also mentions that she had an episode of eating thin mint girl  cookies and almost had anaphylaxis.  She figured out that canola and palm oil do it to her.  Since totally eliminating those things she is having much less symptoms.    But today she is only here to have routine cerumen removal.    Past Medical History -   Patient Active Problem List   Diagnosis     CARDIOVASCULAR SCREENING; LDL GOAL LESS THAN 160     Lactose intolerance     Seasonal allergic rhinitis     Allergy, food     Advanced directives, counseling/discussion     Subclinical hypothyroidism     Osteopenia     Hollenhorst plaque, left eye     Family history of breast cancer     Chronic eczematous otitis externa of both ears       Current Medications -   Current Outpatient Medications:      azelastine (ASTEPRO) 0.15 % nasal spray, Spray 2 sprays into both nostrils 2 times daily, Disp: 30 mL, Rfl: 11     diphenhydrAMINE (BENADRYL) 25 MG tablet, Take 25 mg by mouth every 6 hours as needed for itching or allergies, Disp: , Rfl:      EPINEPHrine (ANY BX GENERIC EQUIV) 0.3 MG/0.3ML injection 2-pack, Inject 0.3 mLs (0.3 mg) into the muscle as needed for anaphylaxis (or exposure to trigger), Disp: 0.6 mL, Rfl: 1     hydrocortisone 2.5 % cream, Apply sparingly to affected area 2 times daily " for no more than 14 days, Disp: 30 g, Rfl: 0     loratadine (CLARITIN) 10 MG tablet, Take 10 mg by mouth daily as needed for allergies, Disp: , Rfl:      mometasone (NASONEX) 50 MCG/ACT nasal spray, Spray 2 sprays into both nostrils daily for allergy prevention., Disp: 17 g, Rfl: 11     prednisoLONE acetate (PRED FORTE) 1 % ophthalmic suspension, 4 drops into both ears twice as needed, Disp: 10 mL, Rfl: 11     predniSONE (DELTASONE) 10 MG tablet, 10mg twice daily for 7 days, then 10mg daily for 7 days, then stop, Disp: 21 tablet, Rfl: 2    Allergies -   Allergies   Allergen Reactions     Docosahexaenoic Acid-Epa Anaphylaxis     Fish Anaphylaxis     Nuts Anaphylaxis     Palmarosa Oil Shortness Of Breath     Shellfish Allergy Anaphylaxis     Aloe Hives     Aloe Vera      Canola Oil [Vegetable Oil] Other (See Comments)     Sneezing     Coconut Oil Other (See Comments)     Fish      Fluticasone      Food Hives     Hydrogenated Palm Oil Glycerides Other (See Comments)     Ibuprofen Sodium      Macrodantin [Nitrofuran Derivatives]      Mineral Oil Itching     Peanut (Diagnostic)      Seafood      Sulfa Drugs      Yellow Dye      Yellow Dyes Itching     Ibuprofen Other (See Comments)     Jittery, Chest pain, SOB, Congestion, Dizziness       Social History -   History     Social History     Marital Status:      Spouse Name: Bruno     Number of Children: 2     Years of Education: N/A     Occupational History      shop       [employer]      Social History Main Topics     Smoking status: Never Smoker      Smokeless tobacco: Never Used     Alcohol Use: Yes      Comment: occassionally     Drug Use: No     Sexual Activity:     Partners: Male     Other Topics Concern     Parent/Sibling W/ Cabg, Mi Or Angioplasty Before 65f 55m? No     Social History Narrative       Family History -   Family History   Problem Relation Age of Onset     Asthma Sister      Thyroid Disease Sister      Lipids Mother      Food  Allergy Mother      Thyroid Disease Mother      Cerebrovascular Disease Father      Asthma Father      Thyroid Disease Brother      Diabetes Type 2  Sister      Breast Cancer Sister 60     Thyroid Disease Sister      Graves' disease Sister      Thyroid Disease Sister      Other - See Comments Son      Other - See Comments Son      C.A.D. No family hx of      Hypertension No family hx of      Cancer - colorectal No family hx of      Prostate Cancer No family hx of        Review of Systems - As per HPI and PMHx, otherwise 7 system review of the head and neck negative.    Physical Exam  /81   Pulse 80   Resp 16   Wt 60.3 kg (133 lb)   SpO2 99%   BMI 22.90 kg/m      General - The patient is well nourished and well developed, and appears to have good nutritional status.  Alert and oriented to person and place, answers questions and cooperates with examination appropriately.   Head and Face - Normocephalic and atraumatic, with no gross asymmetry noted of the contour of the facial features.  The facial nerve is intact, with strong symmetric movements.  Voice and Breathing - The patient was breathing comfortably without the use of accessory muscles. There was no wheezing, stridor, or stertor.  The patients voice was clear and strong, and had appropriate pitch and quality.  Eyes - Extraocular movements intact, and the pupils were reactive to light.  Sclera were not icteric or injected, conjunctiva were pink and moist.  Mouth - Examination of the oral cavity showed pink, healthy oral mucosa. No lesions or ulcerations noted.  The tongue was mobile and midline, and the dentition were in good condition.      Cerumen Removal    Physical Exam and Procedure  Ears - On examination of the ears, I found that the  sides had cerumen impaction.  Therefore, I positioned them in the examination chair in a semi-supine position, beginning with the right side.  I used the binocular surgical microscope to perform cerumen removal.  I  began by using a cerumen loop to gently lift the edges of the cerumen mass away from the walls of the external canal.  Once I did this, I was able to suction away fragments of wax and debris using suction.  Once the mass was loose enough, the entire plug was pulled from the canal.  The tympanic membrane was intact, no sign of perforation or middle ear effusion.    I turned my attention to the contralateral side once again using the binocular surgical microscope to perform cerumen removal.  I began by using a cerumen loop to gently lift the edges of the cerumen mass away from the walls of the external canal.  Once I did this, I was able to suction away fragments of wax and debris using suction.  Once the mass was loose enough, the entire plug was pulled from the canal.  The tympanic membrane was intact, no sign of perforation or middle ear effusion.        A/P - Jhoana Hernandez is a 67 year old female  (J01.41) Acute recurrent pansinusitis  (primary encounter diagnosis)  (J30.1) Seasonal allergic rhinitis due to pollen  (H61.23) Bilateral impacted cerumen    The patient has had their cerumen procedurally removed today.  I have discussed ear care at home, including avoiding qtips or any other object placed in the canal.  I have also discussed that over the counter cerumen kits like Debrox or Cerumenex can be useful.    If no other issues, follow up with me in 6 months          Again, thank you for allowing me to participate in the care of your patient.        Sincerely,        Hussein Pepper MD

## 2023-02-27 ENCOUNTER — OFFICE VISIT (OUTPATIENT)
Dept: FAMILY MEDICINE | Facility: CLINIC | Age: 68
End: 2023-02-27
Payer: MEDICARE

## 2023-02-27 VITALS
TEMPERATURE: 98.3 F | DIASTOLIC BLOOD PRESSURE: 88 MMHG | OXYGEN SATURATION: 99 % | BODY MASS INDEX: 23.39 KG/M2 | SYSTOLIC BLOOD PRESSURE: 135 MMHG | RESPIRATION RATE: 18 BRPM | HEIGHT: 63 IN | HEART RATE: 89 BPM | WEIGHT: 132 LBS

## 2023-02-27 DIAGNOSIS — Z28.21 VACCINATION NOT CARRIED OUT BECAUSE OF PATIENT REFUSAL: ICD-10-CM

## 2023-02-27 DIAGNOSIS — J01.01 ACUTE RECURRENT MAXILLARY SINUSITIS: Primary | ICD-10-CM

## 2023-02-27 DIAGNOSIS — Z12.11 SCREEN FOR COLON CANCER: ICD-10-CM

## 2023-02-27 PROCEDURE — 99213 OFFICE O/P EST LOW 20 MIN: CPT | Performed by: FAMILY MEDICINE

## 2023-02-27 RX ORDER — AZITHROMYCIN 250 MG/1
TABLET, FILM COATED ORAL
Qty: 6 TABLET | Refills: 0 | Status: SHIPPED | OUTPATIENT
Start: 2023-02-27 | End: 2023-03-04

## 2023-02-27 ASSESSMENT — PAIN SCALES - GENERAL: PAINLEVEL: MODERATE PAIN (5)

## 2023-02-27 NOTE — PROGRESS NOTES
"  Assessment & Plan     (J01.01) Acute recurrent maxillary sinusitis  (primary encounter diagnosis)  Comment: Probably secondary to viral URI, but she does have seasonal allergy issues.  Plan: azithromycin (ZITHROMAX) 250 MG tablet        If we need to try another antibiotic we can use Augmentin (or cefuroxime). Return in about 10 days (around 3/9/2023) for recheck if symptoms fail to resolve by then, using a phone visit.      (Z12.11) Screen for colon cancer  Comment:   Plan: Colonoscopy Screening  Referral            (Z28.21) Vaccination not carried out because of patient refusal  Comment: Influenza vaccine offered but declined by the patient.   Plan:         Nelson Camp MD  [facility]      Ann Ely is a 68 year old, presenting for the following health issues:  Sinusitis      HPI     Acute Illness  Acute illness concerns: Sinus  Onset/Duration: 2/20/23  Symptoms:  Fever: No  Chills/Sweats: YES-chills  Headache (location?): YES  Sinus Pressure: YES  Conjunctivitis:  No  Ear Pain: YES- left  Rhinorrhea: YES-blood in nose & green mucus  Congestion: YES  Sore Throat: YES- irritated   Cough: no  Wheeze: YES  Decreased Appetite: YES  Nausea: No  Vomiting: No  Diarrhea: No  Dysuria/Freq.: No  Dysuria or Hematuria: No  Fatigue/Achiness: YES  Sick/Strep Exposure: YES- gandson  Therapies tried and outcome: Afrin, Tylenol & mucinex    The patient reports her first symptoms included congestion and a headache. She also notes that she has developed some chest tightness and dyspnea recently. The patient also adds that she has a history of recurrent sinusitis.     Review of Systems         Objective    /88   Pulse 89   Temp 98.3  F (36.8  C) (Tympanic)   Resp 18   Ht 1.6 m (5' 3\")   Wt 59.9 kg (132 lb)   LMP  (LMP Unknown)   SpO2 99%   BMI 23.38 kg/m    Body mass index is 23.38 kg/m .  Physical Exam   GENERAL: healthy, alert and no distress  EYES: Eyes grossly " normal to inspection, PERRL and conjunctivae and sclerae normal  HENT: normal cephalic/atraumatic, ear canals and TM's normal and sinuses: not tender  NEURO: Normal strength and tone, mentation intact and speech normal  PSYCH: mentation appears normal, affect normal/bright                This document serves as a record of the services and decisions personally performed and made by Dr. Camp. It was created on his behalf by Ivan Carver, a trained medical scribe. The creation of this document is based the provider's statements to the medical scribe.  Ivan Carver,  1:55 PM

## 2023-02-27 NOTE — PATIENT INSTRUCTIONS
At [facility], we strive to deliver an exceptional experience to you, every time we see you. If you receive a survey, please complete it as we do value your feedback.  If you have MyChart, you can expect to receive results automatically within 24 hours of their completion.  Your provider will send a note interpreting your results as well.   If you do not have MyChart, you should receive your results in about a week by mail.    Your care team:                            Family Medicine Internal Medicine   MD Eric Cheema MD Shantel Branch-Fleming, MD Srinivasa Vaka, MD Katya Belousova, PAJOHNNIE March CNP, MD ([facility]   Stephen Escobar, MD Jenn Barnes MD Amelia Massimini APRN TONJA Bae APRN MD Candice Guthrie MD          Clinic hours: Monday - Thursday 7 am-6 pm; Fridays 7 am-5 pm.   Urgent care: Monday - Friday 10 am- 8 pm; Saturday and Sunday 9 am-5 pm.    Clinic: [phone]       [facility]: Monday - Thursday 8 am - 7 pm; Friday 8 am - 6 pm  [facility]: [phone]

## 2023-04-04 ENCOUNTER — TELEPHONE (OUTPATIENT)
Dept: FAMILY MEDICINE | Facility: CLINIC | Age: 68
End: 2023-04-04
Payer: MEDICARE

## 2023-04-04 NOTE — LETTER
April 4, 2023    Jhoana Benjamin [facility]  [address]  [facility] [address]    Dear Jhoana,    At [facility] we care about your health and are committed to providing quality patient care.     Here is a list of Health Maintenance topics that are due now or due soon:  Health Maintenance Due   Topic Date Due    COLORECTAL CANCER SCREENING  07/26/2022    INFLUENZA VACCINE (1) Never done        We are recommending that you:  Schedule a COLONOSCOPY to assess for colon cancer (due every 10 years or 5 years in higher risk situations.)       Colon cancer is now the second leading cause of cancer-related deaths in the [place] for both men and women and there are over 130,000 new cases and 50,000 deaths per year from colon cancer.  Colonoscopies can prevent 90-95% of these deaths.  Problem lesions can be removed before they ever become cancer.  This test is not only looking for cancer, but also getting rid of precancerious lesions.    If you are under/uninsured, we recommend you contact the FIMBex program. FIMBex is a free colorectal cancer screening program that provides colonoscopies for eligible under/uninsured [place] men and women. If you are interested in receiving a free colonoscopy, please call FIMBex at [phone] (mention code [employer]) to see if you re eligible.     If you do not wish to do a colonoscopy or cannot afford to do one, at this time, there is another option. It is called a FIT test or Fecal Immunochemical Occult Blood Test (take home stool sample kit).  It does not replace the colonoscopy for colorectal cancer screening, but it can detect hidden bleeding in the lower colon.  It does need to be repeated every year and if a positive result is obtained, you would be referred for a colonoscopy.    If you have completed either one of these tests at another facility, please call/respond to this message with the details of when and where the  tests were done and if they were normal or not. Or have the records sent to our clinic so that we can best coordinate your care.    To schedule an appointment or discuss this further, you may contact us by phone at the [facility] at [phone] or online through the patient portal/[email or web address]    Thank you for trusting [facility] and we appreciate the opportunity to serve you.  We look forward to supporting your healthcare needs in the future.    Your partners in health,      Quality Committee at [facility]

## 2023-04-04 NOTE — TELEPHONE ENCOUNTER
Patient Quality Outreach    Patient is due for the following:   Colon Cancer Screening    Next Steps:   Patient has upcoming appointment, these items will be addressed at that time. Appointment scheduled with PCP on 5/22/23.    Type of outreach:    Sent letter.      Questions for provider review:    None     Aster Roy MA

## 2023-04-14 ENCOUNTER — OFFICE VISIT (OUTPATIENT)
Dept: FAMILY MEDICINE | Facility: CLINIC | Age: 68
End: 2023-04-14
Payer: MEDICARE

## 2023-04-14 VITALS
DIASTOLIC BLOOD PRESSURE: 84 MMHG | TEMPERATURE: 98.7 F | RESPIRATION RATE: 18 BRPM | HEIGHT: 65 IN | HEART RATE: 89 BPM | SYSTOLIC BLOOD PRESSURE: 149 MMHG | BODY MASS INDEX: 20.76 KG/M2 | WEIGHT: 124.6 LBS | OXYGEN SATURATION: 100 %

## 2023-04-14 DIAGNOSIS — K21.9 GASTROESOPHAGEAL REFLUX DISEASE WITHOUT ESOPHAGITIS: ICD-10-CM

## 2023-04-14 DIAGNOSIS — R19.00 ABDOMINAL PULSATILE MASS: Primary | ICD-10-CM

## 2023-04-14 PROCEDURE — 99214 OFFICE O/P EST MOD 30 MIN: CPT | Performed by: INTERNAL MEDICINE

## 2023-04-14 RX ORDER — PANTOPRAZOLE SODIUM 40 MG/1
40 TABLET, DELAYED RELEASE ORAL DAILY
Qty: 30 TABLET | Refills: 1 | Status: SHIPPED | OUTPATIENT
Start: 2023-04-14 | End: 2023-06-20

## 2023-04-14 ASSESSMENT — PAIN SCALES - GENERAL: PAINLEVEL: NO PAIN (0)

## 2023-04-14 NOTE — PROGRESS NOTES
Assessment & Plan     Abdominal pulsatile mass  A pulsatile mass was found on examination  She has no history of smoking  She is not a diabetic  She does not have any history of hyperlipidemia  Although she has thin body habitus the pulsatile mass is concerning for any possible abdominal aortic aneurysm  We will get a ultrasound of the abdomen  - US Abdominal Aorta Imaging; Future    Gastroesophageal reflux disease without esophagitis  She had a recent sinus infection which was treated in February  After that she had some ear pain in February for which she was taking some prednisone eardrops  On Monday she started having some mild fever  She then had retching for a couple of times  She vomited once  She felt unwell Wednesday and Tuesday  She did not eat much  Today she had a bowel opening  She is not having this sour taste in mouth since Thursday  I suspect she has got GERD  This is probably from acid reflux due to gastroparesis caused by possibly a recent virus infection  Advised her to stay off citrusy liquids and any spicy foods   We will try a course of Protonix  - pantoprazole (PROTONIX) 40 MG EC tablet; Take 1 tablet (40 mg) by mouth daily      30 minutes spent by me on the date of the encounter doing chart review, history and exam, documentation and further activities per the note           MD RAYMUNDO [facility]    Ann Ely is a 68 year old, presenting for the following health issues:  Abdominal Pain        4/14/2023    10:43 AM   Additional Questions   Roomed by harish dias   Accompanied by none         4/14/2023    10:43 AM   Patient Reported Additional Medications   Patient reports taking the following new medications none     History of Present Illness       Reason for visit:  Posible acid reflux    She eats 4 or more servings of fruits and vegetables daily.She consumes 0 sweetened beverage(s) daily.She exercises with enough effort to increase her heart  [facility] Center    Phone Message    May a detailed message be left on voicemail: no     Reason for Call: Other: Patient's mother calling wondering if they should be seen before 11/21. Patient has concerns about numbness & tingling in fingers and toes getting worse then when he was in the hospital                                              "rate 10 to 19 minutes per day.  She exercises with enough effort to increase her heart rate 4 days per week.   She is taking medications regularly.               Review of Systems   Constitutional, HEENT, cardiovascular, pulmonary, gi and gu systems are negative, except as otherwise noted.      Objective    BP (!) 149/84   Pulse 89   Temp 98.7  F (37.1  C) (Tympanic)   Resp 18   Ht 1.651 m (5' 5\")   Wt 56.5 kg (124 lb 9.6 oz)   LMP  (LMP Unknown)   SpO2 100%   BMI 20.73 kg/m    Body mass index is 20.73 kg/m .  Physical Exam   GENERAL: healthy, alert and no distress  NECK: no adenopathy, no asymmetry, masses, or scars and thyroid normal to palpation  RESP: lungs clear to auscultation - no rales, rhonchi or wheezes  CV: regular rate and rhythm, normal S1 S2, no S3 or S4, no murmur, click or rub, no peripheral edema and peripheral pulses strong  ABDOMEN: Pulsatile swelling felt in the abdomen  MS: no gross musculoskeletal defects noted, no edema                    "

## 2023-04-17 ENCOUNTER — ANCILLARY PROCEDURE (OUTPATIENT)
Dept: ULTRASOUND IMAGING | Facility: CLINIC | Age: 68
End: 2023-04-17
Attending: INTERNAL MEDICINE
Payer: MEDICARE

## 2023-04-17 DIAGNOSIS — R19.00 ABDOMINAL PULSATILE MASS: ICD-10-CM

## 2023-04-17 PROCEDURE — 76775 US EXAM ABDO BACK WALL LIM: CPT | Performed by: RADIOLOGY

## 2023-04-19 ENCOUNTER — TELEPHONE (OUTPATIENT)
Dept: FAMILY MEDICINE | Facility: CLINIC | Age: 68
End: 2023-04-19
Payer: MEDICARE

## 2023-04-19 NOTE — TELEPHONE ENCOUNTER
Patient calling to get ultrasound results from earlier this week.   Does not use her edeneshart so cannot see if a message was sent to her.  RN read the below Result Note to her:    Ms. Hernandez,   Your ultrasound of the abdomen looks very good  There is no aneurysm detected  This is good news  This means that the pulsations that you are feeling were just transmitted pulsations because of your thin body habitus  This is very reassuring that there is no aneurysm   Please contact the clinic if you have additional questions.  Thank you.     Sincerely,   Ramiro Diaz MD    Patient was very happy to hear the results were normal.   No further questions from patient.     Karen Da Silva BSN, RN

## 2023-04-24 ENCOUNTER — TELEPHONE (OUTPATIENT)
Dept: GASTROENTEROLOGY | Facility: CLINIC | Age: 68
End: 2023-04-24
Payer: MEDICARE

## 2023-04-24 NOTE — TELEPHONE ENCOUNTER
Patient is going to hold off on scheduling for now.  She wants to talk with her MD on May 22 before scheduling.  She will call us back to schedule.  Direct phone number give to patient for scheduling.  Screening Questions  BLUE  KIND OF PREP RED  LOCATION [review exclusion criteria] GREEN  SEDATION TYPE        No Are you active on mychart?       Camp Ordering/Referring Provider?        Medicare BCBS What type of coverage do you have?      N Have you had a positive covid test in the last 14 days?      1. BMI  [BMI 40+ - review exclusion criteria& smart-phrase document]      2. Are you able to give consent for your medical care? [IF NO,RN REVIEW]            3. Are you taking any prescription pain medications on a routine schedule   (ex narcotics: oxycodone, roxicodone, oxycontin,  and percocet)? [RN Review]          3a. EXTENDED PREP What kind of prescription?      4. Do you have any chemical dependencies such as alcohol, street drugs, or methadone?        **If yes 3- 5 , please schedule with MAC sedation.**          IF YES TO ANY 6 - 10 - HOSPITAL SETTING ONLY.      6.   Do you need assistance transferring?      7.   Have you had a heart or lung transplant?     8.   Are you currently on dialysis?    9.   Do you use daily home oxygen?    10. Do you take nitroglycerin?   10a.  If yes, how often?     11. [FEMALES]   Are you currently pregnant?    11a.  If yes, how many weeks? [ Greater than 12 weeks, OR NEEDED]     12. Do you have Pulmonary Hypertension? *NEED PAC APPT AT [facility] w/ MAC*      13. [review exclusion criteria]  Do you have any implantable devices in your body (pacemaker, defib, LVAD)?     14. In the past 6 months, have you had any heart related issues including cardiomyopathy or heart attack?     14a.  If yes, did it require cardiac stenting if so when?      15. Have you had a stroke or Transient ischemic attack (TIA - aka  mini stroke ) within 6 months?       16. Do you have mod to severe Obstructive  "Sleep Apnea?  [Hospital only]     17. Do you have SEVERE AND UNCONTROLLED asthma? *NEED PAC APPT AT [facility] w/MAC*     18. Are you currently taking any blood thinners?     18a.    18b.      19. Do you take the medication Phentermine?    19a. If yes, \"Hold for 7 days before procedure.  Please consult your prescribing provider if you have questions about holding this medication.\"       20. Do you have chronic kidney disease?        21. Do you have a diagnosis of diabetes?       22. On a regular basis do you go 3-5 days between bowel movements?      23. Preferred LOCAL Pharmacy for Pre Prescription    [ LIST ONLY ONE PHARMACY]     [id number] - [place], [place] - [address]        - CLOSING REMINDERS -    Informed patient they will need an adult    Cannot take any type of public or medical transportation alone    Conscious Sedation- Needs  for 6 hours after the procedure       MAC/General-Needs  for 24 hours after procedure    Pre-Procedure Covid test to be completed [[facility] PCR Testing Required]    Confirmed Nurse will call to complete assessment       - SCHEDULING DETAILS -   Hospital Setting Required? If yes, what is the exclusion?:      Surgeon      Date of Procedure    Type of Procedure Scheduled  Location   Which Colonoscopy Prep was Sent?      Sedation Type      PAC / Pre-op Required                 "

## 2023-05-01 ENCOUNTER — TELEPHONE (OUTPATIENT)
Dept: FAMILY MEDICINE | Facility: CLINIC | Age: 68
End: 2023-05-01
Payer: MEDICARE

## 2023-05-01 NOTE — TELEPHONE ENCOUNTER
Patient Quality Outreach    Patient is due for the following:   Colon Cancer Screening  Physical Annual Wellness Visit    Next Steps:   Patient has upcoming appointment, these items will be addressed at that time. Patient has appointment scheduled for 5/22/23 with PCP.    Type of outreach:    Chart review performed, no outreach needed.      Questions for provider review:    None     Aster Roy MA

## 2023-05-22 ENCOUNTER — TELEPHONE (OUTPATIENT)
Dept: GASTROENTEROLOGY | Facility: CLINIC | Age: 68
End: 2023-05-22

## 2023-05-22 ENCOUNTER — HOSPITAL ENCOUNTER (OUTPATIENT)
Facility: AMBULATORY SURGERY CENTER | Age: 68
End: 2023-05-22
Attending: INTERNAL MEDICINE
Payer: MEDICARE

## 2023-05-22 ENCOUNTER — OFFICE VISIT (OUTPATIENT)
Dept: FAMILY MEDICINE | Facility: CLINIC | Age: 68
End: 2023-05-22
Payer: MEDICARE

## 2023-05-22 VITALS
RESPIRATION RATE: 16 BRPM | BODY MASS INDEX: 19.81 KG/M2 | DIASTOLIC BLOOD PRESSURE: 78 MMHG | SYSTOLIC BLOOD PRESSURE: 134 MMHG | TEMPERATURE: 97.6 F | HEIGHT: 64 IN | OXYGEN SATURATION: 99 % | WEIGHT: 116 LBS | HEART RATE: 80 BPM

## 2023-05-22 DIAGNOSIS — Z13.6 CARDIOVASCULAR SCREENING; LDL GOAL LESS THAN 160: ICD-10-CM

## 2023-05-22 DIAGNOSIS — Z12.11 SCREEN FOR COLON CANCER: ICD-10-CM

## 2023-05-22 DIAGNOSIS — H61.21 IMPACTED CERUMEN OF RIGHT EAR: ICD-10-CM

## 2023-05-22 DIAGNOSIS — Z00.00 ENCOUNTER FOR MEDICARE ANNUAL WELLNESS EXAM: Primary | ICD-10-CM

## 2023-05-22 DIAGNOSIS — E03.8 SUBCLINICAL HYPOTHYROIDISM: ICD-10-CM

## 2023-05-22 DIAGNOSIS — R63.4 UNINTENTIONAL WEIGHT LOSS: ICD-10-CM

## 2023-05-22 DIAGNOSIS — Z12.31 VISIT FOR SCREENING MAMMOGRAM: ICD-10-CM

## 2023-05-22 DIAGNOSIS — Z23 NEED FOR VACCINATION: ICD-10-CM

## 2023-05-22 DIAGNOSIS — R63.0 LOSS OF APPETITE: ICD-10-CM

## 2023-05-22 LAB
ALBUMIN SERPL-MCNC: 4 G/DL (ref 3.4–5)
ALP SERPL-CCNC: 57 U/L (ref 40–150)
ALT SERPL W P-5'-P-CCNC: 26 U/L (ref 0–50)
AMYLASE SERPL-CCNC: 54 U/L (ref 30–110)
ANION GAP SERPL CALCULATED.3IONS-SCNC: 5 MMOL/L (ref 3–14)
AST SERPL W P-5'-P-CCNC: 16 U/L (ref 0–45)
BASOPHILS # BLD AUTO: 0 10E3/UL (ref 0–0.2)
BASOPHILS NFR BLD AUTO: 1 %
BILIRUB SERPL-MCNC: 0.6 MG/DL (ref 0.2–1.3)
BUN SERPL-MCNC: 16 MG/DL (ref 7–30)
CALCIUM SERPL-MCNC: 9.2 MG/DL (ref 8.5–10.1)
CHLORIDE BLD-SCNC: 107 MMOL/L (ref 94–109)
CHOLEST SERPL-MCNC: 206 MG/DL
CO2 SERPL-SCNC: 29 MMOL/L (ref 20–32)
CREAT SERPL-MCNC: 0.94 MG/DL (ref 0.52–1.04)
EOSINOPHIL # BLD AUTO: 0.2 10E3/UL (ref 0–0.7)
EOSINOPHIL NFR BLD AUTO: 4 %
ERYTHROCYTE [DISTWIDTH] IN BLOOD BY AUTOMATED COUNT: 12.5 % (ref 10–15)
FASTING STATUS PATIENT QL REPORTED: YES
GFR SERPL CREATININE-BSD FRML MDRD: 65 ML/MIN/1.73M2
GLUCOSE BLD-MCNC: 104 MG/DL (ref 70–99)
HCT VFR BLD AUTO: 42 % (ref 35–47)
HDLC SERPL-MCNC: 79 MG/DL
HGB BLD-MCNC: 13.8 G/DL (ref 11.7–15.7)
IMM GRANULOCYTES # BLD: 0 10E3/UL
IMM GRANULOCYTES NFR BLD: 0 %
LDLC SERPL CALC-MCNC: 111 MG/DL
LYMPHOCYTES # BLD AUTO: 1.5 10E3/UL (ref 0.8–5.3)
LYMPHOCYTES NFR BLD AUTO: 26 %
MCH RBC QN AUTO: 30 PG (ref 26.5–33)
MCHC RBC AUTO-ENTMCNC: 32.9 G/DL (ref 31.5–36.5)
MCV RBC AUTO: 91 FL (ref 78–100)
MONOCYTES # BLD AUTO: 0.3 10E3/UL (ref 0–1.3)
MONOCYTES NFR BLD AUTO: 5 %
NEUTROPHILS # BLD AUTO: 3.8 10E3/UL (ref 1.6–8.3)
NEUTROPHILS NFR BLD AUTO: 64 %
NONHDLC SERPL-MCNC: 127 MG/DL
PLATELET # BLD AUTO: 295 10E3/UL (ref 150–450)
POTASSIUM BLD-SCNC: 4.5 MMOL/L (ref 3.4–5.3)
PROT SERPL-MCNC: 8 G/DL (ref 6.8–8.8)
RBC # BLD AUTO: 4.6 10E6/UL (ref 3.8–5.2)
SODIUM SERPL-SCNC: 141 MMOL/L (ref 133–144)
TRIGL SERPL-MCNC: 78 MG/DL
TSH SERPL DL<=0.005 MIU/L-ACNC: 3.18 MU/L (ref 0.4–4)
WBC # BLD AUTO: 5.9 10E3/UL (ref 4–11)

## 2023-05-22 PROCEDURE — 82150 ASSAY OF AMYLASE: CPT | Performed by: FAMILY MEDICINE

## 2023-05-22 PROCEDURE — G0439 PPPS, SUBSEQ VISIT: HCPCS | Performed by: FAMILY MEDICINE

## 2023-05-22 PROCEDURE — 91312 COVID-19 BIVALENT 12+ (PFIZER): CPT | Performed by: FAMILY MEDICINE

## 2023-05-22 PROCEDURE — 99213 OFFICE O/P EST LOW 20 MIN: CPT | Mod: 25 | Performed by: FAMILY MEDICINE

## 2023-05-22 PROCEDURE — 80050 GENERAL HEALTH PANEL: CPT | Performed by: FAMILY MEDICINE

## 2023-05-22 PROCEDURE — 80061 LIPID PANEL: CPT | Performed by: FAMILY MEDICINE

## 2023-05-22 PROCEDURE — 36415 COLL VENOUS BLD VENIPUNCTURE: CPT | Performed by: FAMILY MEDICINE

## 2023-05-22 PROCEDURE — 0121A COVID-19 BIVALENT 12+ (PFIZER): CPT | Performed by: FAMILY MEDICINE

## 2023-05-22 PROCEDURE — 69209 REMOVE IMPACTED EAR WAX UNI: CPT | Mod: RT | Performed by: FAMILY MEDICINE

## 2023-05-22 ASSESSMENT — ENCOUNTER SYMPTOMS
PALPITATIONS: 0
JOINT SWELLING: 0
CONSTIPATION: 0
ARTHRALGIAS: 0
WEAKNESS: 0
ABDOMINAL PAIN: 1
NAUSEA: 0
SORE THROAT: 0
DYSURIA: 0
COUGH: 0
NERVOUS/ANXIOUS: 0
SHORTNESS OF BREATH: 0
HEARTBURN: 1
HEMATOCHEZIA: 0
DIZZINESS: 0
HEMATURIA: 0
MYALGIAS: 0
FREQUENCY: 0
PARESTHESIAS: 0
DIARRHEA: 0
EYE PAIN: 0
FEVER: 0
HEADACHES: 0
CHILLS: 0
BREAST MASS: 0

## 2023-05-22 ASSESSMENT — ACTIVITIES OF DAILY LIVING (ADL): CURRENT_FUNCTION: NO ASSISTANCE NEEDED

## 2023-05-22 NOTE — TELEPHONE ENCOUNTER
Called Pt to help get her scheduled for a sooner GI appointment. Pt wanted an appointment after her endoscopy on 6/8/2023 with Dr. Cordova. Pt also wanted to be scheduled with Dr. Cordova for an in-person clinic visit. Writer informed Pt that there was no sooner availability with Dr. Cordova for in-person clinic visit. Pt would like to keep appointment as scheduled for 7/11/2023 and be put on a waiting list. Closing encounter.

## 2023-05-22 NOTE — PROGRESS NOTES
"SUBJECTIVE:   Jhoana is a 68 year old who presents for Preventive Visit. She also wants to discuss specific issues regarding reflux and weight loss.      5/22/2023     7:48 AM   Additional Questions   Roomed by corazon   Patient has been advised of split billing requirements and indicates understanding: Yes  Are you in the first 12 months of your Medicare coverage?  No    Healthy Habits:     In general, how would you rate your overall health?  Good    Frequency of exercise:  6-7 days/week    Duration of exercise:  15-30 minutes    Do you usually eat at least 4 servings of fruit and vegetables a day, include whole grains    & fiber and avoid regularly eating high fat or \"junk\" foods?  Yes    Taking medications regularly:  Yes    Medication side effects:  None    Ability to successfully perform activities of daily living:  No assistance needed    Home Safety:  No safety concerns identified    Hearing Impairment:  No hearing concerns    In the past 6 months, have you been bothered by leaking of urine?  No    In general, how would you rate your overall mental or emotional health?  Good      PHQ-2 Total Score: 0    Additional concerns today:  Yes      The patient reports that she has unintentionally lost 13 pounds over the last few months. She would prefer to be gaining weight, and reports that she is \"really trying to eat\". She overall feels \"unwell\" and feels like she is starving herself even though she believes she is eating healthy/enough food. She has more of an appetite compared to what she had on her 4/14/23 visit, but still not her baseline.    Have you ever done Advance Care Planning? (For example, a Health Directive, POLST, or a discussion with a medical provider or your loved ones about your wishes): Yes, advance care planning is on file.       Fall risk  Fallen 2 or more times in the past year?: No  Any fall with injury in the past year?: No    Cognitive Screening   1) Repeat 3 items (Leader, Season, Table)  "   2) Clock draw: NORMAL  3) 3 item recall: Recalls 3 objects  Results: 3 items recalled: COGNITIVE IMPAIRMENT LESS LIKELY    Mini-CogTM Copyright JANINA Richey. Licensed by the author for use in [facility]; reprinted with permission (rasheed@[facility]). All rights reserved.      Do you have sleep apnea, excessive snoring or daytime drowsiness?: yes                     Social History     Tobacco Use     Smoking status: Never     Smokeless tobacco: Never   Vaping Use     Vaping status: Never Used     Passive vaping exposure: Yes   Substance Use Topics     Alcohol use: Yes     Comment: occassionally             5/22/2023     8:12 AM   Alcohol Use   Prescreen: >3 drinks/day or >7 drinks/week? No     Do you have a current opioid prescription?   Do you use any other controlled substances or medications that are not prescribed by a provider?         Hypothyroidism Follow-up      Since last visit, patient describes the following symptoms: weight loss of 13 lbs over the last few months      Current providers sharing in care for this patient include:   Patient Care Team:  Nelson Camp MD as PCP - General (Family Practice)  Nelson Camp MD as Assigned PCP  Hussein Pepper MD as Assigned Surgical Provider  Adrian Lagunas MD as MD (Otolaryngology)    The following health maintenance items are reviewed in Epic and correct as of today:  Health Maintenance   Topic Date Due     COLORECTAL CANCER SCREENING  07/26/2022     INFLUENZA VACCINE (1) Never done     MEDICARE ANNUAL WELLNESS VISIT  05/17/2023     MAMMO SCREENING  06/07/2023     ANNUAL REVIEW OF HM ORDERS  05/22/2024     FALL RISK ASSESSMENT  05/22/2024     LIPID  05/17/2027     ADVANCE CARE PLANNING  05/22/2028     DTAP/TDAP/TD IMMUNIZATION (2 - Td or Tdap) 05/25/2028     DEXA  04/16/2036     HEPATITIS C SCREENING  Completed     PHQ-2 (once per calendar year)  Completed     Pneumococcal Vaccine: 65+ Years  Completed     ZOSTER IMMUNIZATION   "Completed     COVID-19 Vaccine  Completed     IPV IMMUNIZATION  Aged Out     MENINGITIS IMMUNIZATION  Aged Out         FHS-7:       4/9/2021     7:14 AM 6/7/2022     8:22 AM 5/22/2023     8:15 AM   Breast CA Risk Assessment (FHS-7)   Did any of your first-degree relatives have breast or ovarian cancer? Yes Yes Yes   Did any of your relatives have bilateral breast cancer? No No Unknown   Did any man in your family have breast cancer? No No Yes   Did any woman in your family have breast and ovarian cancer? No No No   Did any woman in your family have breast cancer before age 50 y? No No No   Do you have 2 or more relatives with breast and/or ovarian cancer? No No No   Do you have 2 or more relatives with breast and/or bowel cancer? No No No       Pertinent mammograms are reviewed under the imaging tab.    Review of Systems   Constitutional: Negative for chills and fever.   HENT: Positive for ear pain. Negative for congestion, hearing loss and sore throat.    Eyes: Negative for pain and visual disturbance.   Respiratory: Negative for cough and shortness of breath.    Cardiovascular: Negative for chest pain, palpitations and peripheral edema.   Gastrointestinal: Positive for abdominal pain and heartburn. Negative for constipation, diarrhea, hematochezia and nausea.   Breasts:  Negative for tenderness, breast mass and discharge.   Genitourinary: Negative for dysuria, frequency, genital sores, hematuria, pelvic pain, urgency, vaginal bleeding and vaginal discharge.   Musculoskeletal: Negative for arthralgias, joint swelling and myalgias.   Skin: Negative for rash.   Neurological: Negative for dizziness, weakness, headaches and paresthesias.   Psychiatric/Behavioral: Negative for mood changes. The patient is not nervous/anxious.          OBJECTIVE:   /78 (BP Location: Left arm, Patient Position: Sitting, Cuff Size: Adult Regular)   Pulse 80   Temp 97.6  F (36.4  C) (Oral)   Resp 16   Ht 1.626 m (5' 4\")   Wt 52.6 " "kg (116 lb)   LMP  (LMP Unknown)   SpO2 99%   BMI 19.91 kg/m   Estimated body mass index is 19.91 kg/m  as calculated from the following:    Height as of this encounter: 1.626 m (5' 4\").    Weight as of this encounter: 52.6 kg (116 lb).  Physical Exam  GENERAL APPEARANCE: healthy, alert and no distress  EYES: Eyes grossly normal to inspection, PERRL and conjunctivae and sclerae normal  HENT: left ear canal and TM's normal, right ear canal occluded with cerumen, nose and mouth without ulcers or lesions, oropharynx clear and oral mucous membranes moist  NECK: no adenopathy, no asymmetry, masses, or scars and thyroid normal to palpation  RESP: lungs clear to auscultation - no rales, rhonchi or wheezes  BREAST: normal without masses, tenderness or nipple discharge and no palpable axillary masses or adenopathy  CV: regular rate and rhythm, normal S1 S2, no S3 or S4, no murmur, click or rub, no peripheral edema and peripheral pulses strong  ABDOMEN: soft, nontender, no hepatosplenomegaly, no masses and bowel sounds normal  MS: no musculoskeletal defects are noted and gait is age appropriate without ataxia  SKIN: no suspicious lesions or rashes  NEURO: Normal strength and tone, sensory exam grossly normal, mentation intact and speech normal. Reflexes normal and symmetric in the upper and lower extremities. Left shoulder movements, as noted previously  PSYCH: mentation appears normal and affect normal/bright         ASSESSMENT / PLAN:   (Z00.00) Encounter for Medicare annual wellness exam  (primary encounter diagnosis)  Comment: Negative screening exam; up-to-date on preventive services except colon cancer screening.  Plan: COVID-19 BIVALENT 12+ (PFIZER)       Return in about 1 year (around 5/22/2024) for Medicare annual wellness exam.      (R63.4) Unintentional weight loss  (R63.0) Loss of appetite  Comment: 16 pounds in 3 months. I find this concerning.  Plan: CT Chest Abdomen Pelvis w/o & w Contrast,         " Comprehensive metabolic panel (BMP + Alb, Alk         Phos, ALT, AST, Total. Bili, TP), CBC with         platelets and differential, Amylase, Adult GI          Referral - Consult Only        I encouraged her to go ahead and get her colonoscopy scheduled as well. Further follow up depending on any abnormal study results, and ultimately she will follow up with GI for a possible digestive issue.    (H61.21) Impacted cerumen of right ear  Comment: resolved following irrigation by my MA.  Plan: UT REMOVAL IMPACTED CERUMEN IRRIGATION/LVG         UNILAT            (E03.8) Subclinical hypothyroidism  Comment: periodic monitoring  Plan: TSH with free T4 reflex            (Z13.6) CARDIOVASCULAR SCREENING; LDL GOAL LESS THAN 160  Comment:   Plan: Lipid panel reflex to direct LDL Non-fasting            (Z12.31) Visit for screening mammogram  Comment:   Plan: MA SCREENING DIGITAL BILAT - Future  (s+30)            (Z12.11) Screen for colon cancer  Comment: we discussed the locations of various providers that are available to her.  Plan: colonoscopy    (Z23) Need for vaccination  Comment:   Plan: COVID-19 BIVALENT 12+ (PFIZER)                  COUNSELING:  Reviewed preventive health counseling, as reflected in patient instructions  Special attention given to:       Immunizations    Vaccinated for: Covid-19             Colon cancer screening       Breast cancer screening            She reports that she has never smoked. She has never used smokeless tobacco.      Appropriate preventive services were discussed with this patient, including applicable screening as appropriate for cardiovascular disease, diabetes, osteopenia/osteoporosis, and glaucoma.  As appropriate for age/gender, discussed screening for colorectal cancer, prostate cancer, breast cancer, and cervical cancer. Checklist reviewing preventive services available has been given to the patient.    Reviewed patients plan of care and provided an AVS. The Basic Care  Plan (routine screening as documented in Health Maintenance) for Jhoana meets the Care Plan requirement. This Care Plan has been established and reviewed with the Patient.      Nelson Camp MD  [facility]      This document serves as a record of the services and decisions personally performed and made by Dr. Camp. It was created on his behalf by Ivan Carver, a trained medical scribe. The creation of this document is based the provider's statements to the medical scribe.  Ivan Carver,  11:02 AM

## 2023-05-22 NOTE — NURSING NOTE
Patient identified using two patient identifiers.  Ear exam showing wax occlusion completed by RN.  Solution: warm water and H202/H20 was placed in the right ear(s) via irrigation tool: elephant ear.

## 2023-05-22 NOTE — TELEPHONE ENCOUNTER
Screening Questions  BLUE  KIND OF PREP RED  LOCATION [review exclusion criteria] GREEN  SEDATION TYPE        nAre you active on mychart?     Conrado   Ordering/Referring Provider?        Medicare/ BCBS What type of coverage do you have?      n Have you had a positive covid test in the last 14 days?     19.9 1. BMI  [BMI 40+ - review exclusion criteria& smart-phrase document]    y  2. Are you able to give consent for your medical care? [IF NO,RN REVIEW]          n  3. Are you taking any prescription pain medications on a routine schedule   (ex narcotics: oxycodone, roxicodone, oxycontin,  and percocet)? [RN Review]        n  3a. EXTENDED PREP What kind of prescription?     n 4. Do you have any chemical dependencies such as alcohol, street drugs, or methadone?        **If yes 3- 5 , please schedule with MAC sedation.**          IF YES TO ANY 6 - 10 - HOSPITAL SETTING ONLY.     n 6.   Do you need assistance transferring?     n 7.   Have you had a heart or lung transplant?    n 8.   Are you currently on dialysis?   n 9.   Do you use daily home oxygen?   n 10. Do you take nitroglycerin?   10a. n If yes, how often?     n 11. Are you currently pregnant?    11a. n If yes, how many weeks? [ Greater than 12 weeks, OR NEEDED]    n 12. Do you have Pulmonary Hypertension? *NEED PAC APPT AT [facility] w/ [facility]*     n 13. [review exclusion criteria]  Do you have any implantable devices in your body (pacemaker, defib, LVAD)?    n 14. In the past 6 months, have you had any heart related issues including cardiomyopathy or heart attack?     14a. n If yes, did it require cardiac stenting if so when?     n 15. Have you had a stroke or Transient ischemic attack (TIA - aka  mini stroke ) within 6 months?      n 16. Do you have mod to severe Obstructive Sleep Apnea?  [Hospital only]    n 17. Do you have SEVERE AND UNCONTROLLED asthma? *NEED PAC APPT AT [facility] w/[facility]*     18.Do you take blood thinners?  No    n 19. Do you take any of the following  "medications?    Phentermine    Ozempic    Wegovy (Semaglutide)      19a. If yes, \"Hold for 7 days before procedure.  Please consult your prescribing provider if you have questions about holding this medication.\"     n  20. Do you have chronic kidney disease?      n  21. Do you have a diagnosis of diabetes?     n  22. On a regular basis do you go 3-5 days between bowel movements?      23. Preferred [facility] for Pre Prescription      [id number] - [facility] [address]        - CLOSING REMINDERS -    You will receive a call from a Nurse to review instructions and health history.  This assessment must be completed prior to your procedure.  Failure to complete the Nurse assessment may result in the procedure being cancelled.      On the day of your procedure, please designatean adult(s) who can drive you home stay with you for the next 24 hours. The medicines used in the exam will make you sleepy. You will not be able to drive.      You cannot take public transportation, ride share services, or non-medical taxi service without a responsible caregiver.  Medical transport services are allowed with the requirement that a responsible caregiver will receive you at your destination.  We require that drivers and caregivers are confirmed prior to your procedure.      - SCHEDULING DETAILS -  n &  Hospital Setting Required & If yes, what is the exclusion?   Tasha  Surgeon    6/8  Date of Procedure  Lower Endoscopy [Colonoscopy]  Type of Procedure Scheduled  [facility] [place]   MIRALAX GATORADE WITHOUT MAGNEISUM CITRATE Which Colonoscopy Prep was Sent?     CS Sedation Type     n PAC / Pre-op Required                 "

## 2023-05-22 NOTE — LETTER
May 26, 2023      Jhoana Hernandez  [address] [place] [address]        Ms. Hernandez,     All of your labs were normal.     Please contact the clinic if you have additional questions.  Thank you.     Sincerely,      Resulted Orders   Lipid panel reflex to direct LDL Non-fasting   Result Value Ref Range    Cholesterol 206 (H) <200 mg/dL    Triglycerides 78 <150 mg/dL    Direct Measure HDL 79 >=50 mg/dL    LDL Cholesterol Calculated 111 (H) <=100 mg/dL    Non HDL Cholesterol 127 <130 mg/dL    Patient Fasting > 8hrs? Yes     Narrative    Cholesterol  Desirable:  <200 mg/dL    Triglycerides  Normal:  Less than 150 mg/dL  Borderline High:  150-199 mg/dL  High:  200-499 mg/dL  Very High:  Greater than or equal to 500 mg/dL    Direct Measure HDL  Female:  Greater than or equal to 50 mg/dL   Male:  Greater than or equal to 40 mg/dL    LDL Cholesterol  Desirable:  <100mg/dL  Above Desirable:  100-129 mg/dL   Borderline High:  130-159 mg/dL   High:  160-189 mg/dL   Very High:  >= 190 mg/dL    Non HDL Cholesterol  Desirable:  130 mg/dL  Above Desirable:  130-159 mg/dL  Borderline High:  160-189 mg/dL  High:  190-219 mg/dL  Very High:  Greater than or equal to 220 mg/dL   TSH with free T4 reflex   Result Value Ref Range    TSH 3.18 0.40 - 4.00 mU/L   Comprehensive metabolic panel (BMP + Alb, Alk Phos, ALT, AST, Total. Bili, TP)   Result Value Ref Range    Sodium 141 133 - 144 mmol/L    Potassium 4.5 3.4 - 5.3 mmol/L    Chloride 107 94 - 109 mmol/L    Carbon Dioxide (CO2) 29 20 - 32 mmol/L    Anion Gap 5 3 - 14 mmol/L    Urea Nitrogen 16 7 - 30 mg/dL    Creatinine 0.94 0.52 - 1.04 mg/dL    Calcium 9.2 8.5 - 10.1 mg/dL    Glucose 104 (H) 70 - 99 mg/dL    Alkaline Phosphatase 57 40 - 150 U/L    AST 16 0 - 45 U/L    ALT 26 0 - 50 U/L    Protein Total 8.0 6.8 - 8.8 g/dL    Albumin 4.0 3.4 - 5.0 g/dL    Bilirubin Total 0.6 0.2 - 1.3 mg/dL    GFR Estimate 65 >60 mL/min/1.73m2      Comment:      eGFR calculated using  2021 CKD-EPI equation.   Amylase   Result Value Ref Range    Amylase 54 30 - 110 U/L   CBC with platelets and differential   Result Value Ref Range    WBC Count 5.9 4.0 - 11.0 10e3/uL    RBC Count 4.60 3.80 - 5.20 10e6/uL    Hemoglobin 13.8 11.7 - 15.7 g/dL    Hematocrit 42.0 35.0 - 47.0 %    MCV 91 78 - 100 fL    MCH 30.0 26.5 - 33.0 pg    MCHC 32.9 31.5 - 36.5 g/dL    RDW 12.5 10.0 - 15.0 %    Platelet Count 295 150 - 450 10e3/uL    % Neutrophils 64 %    % Lymphocytes 26 %    % Monocytes 5 %    % Eosinophils 4 %    % Basophils 1 %    % Immature Granulocytes 0 %    Absolute Neutrophils 3.8 1.6 - 8.3 10e3/uL    Absolute Lymphocytes 1.5 0.8 - 5.3 10e3/uL    Absolute Monocytes 0.3 0.0 - 1.3 10e3/uL    Absolute Eosinophils 0.2 0.0 - 0.7 10e3/uL    Absolute Basophils 0.0 0.0 - 0.2 10e3/uL    Absolute Immature Granulocytes 0.0 <=0.4 10e3/uL

## 2023-05-22 NOTE — PATIENT INSTRUCTIONS
At [facility], we strive to deliver an exceptional experience to you, every time we see you. If you receive a survey, please complete it as we do value your feedback.  If you have MyChart, you can expect to receive results automatically within 24 hours of their completion.  Your provider will send a note interpreting your results as well.   If you do not have MyChart, you should receive your results in about a week by mail.    Your care team:                            Family Medicine Internal Medicine   MD Eric Cheema MD Shantel Branch-Fleming, MD Srinivasa Vaka, MD Katya Belousova, JOHNNIE Matthews CNP, MD ([facility]   Stephen Escobar, MD Jenn Barnes MD Amelia Massimini APRN CNP   Arielle Bae APRN MD Candice Guthrie MD          Clinic hours: Monday - Thursday 7 am-6 pm; Fridays 7 am-5 pm.   Urgent care: Monday - Friday 10 am- 8 pm; Saturday and Sunday 9 am-5 pm.    Clinic: [phone]       [facility]: Monday - Thursday 8 am - 7 pm; Friday 8 am - 6 pm  [facility]: [phone]       Please call [facility]: [phone] to schedule your mammogram and CT scans.

## 2023-05-22 NOTE — TELEPHONE ENCOUNTER
[facility] Call Center    Phone Message    May a detailed message be left on voicemail: yes     Reason for Call: Other: Pt referral for emergent apt, was not able to schedule in the allowed timeframe.. Please review     Action Taken: Message routed to:  Adult Clinics: Gastroenterology (GI) p [id number]    Travel Screening: Not Applicable

## 2023-05-23 ENCOUNTER — TELEPHONE (OUTPATIENT)
Dept: GASTROENTEROLOGY | Facility: CLINIC | Age: 68
End: 2023-05-23

## 2023-05-23 NOTE — TELEPHONE ENCOUNTER
Pre assessment questions completed for upcoming Colonoscopy  procedure scheduled on 06.08.2023    COVID policy reviewed.     Pre-op exam? N/A    Reviewed procedural arrival time 1225, procedure time 1310 and facility location [facility]; [address], [place] [address]    Designated  policy reviewed. Instructed to have someone stay 6 hours post procedure.     NSAIDs? No    Anticoagulation/blood thinners? No    Electronic implanted devices? No    Diabetic? No    Procedure indication: unintentional weight loss, loss of appetite    Bowel prep recommendation: Miralax prep without magnesium citrate    Reviewed procedure prep instructions.     Prep instructions sent via letter.      Patient verbalized understanding and had no questions or concerns at this time.    Luisana Kerr RN  Endoscopy Procedure Pre Assessment RN

## 2023-05-23 NOTE — TELEPHONE ENCOUNTER
REFERRAL INFORMATION:    Referring Provider:  Nelson Camp MD    Referring Clinic:  [facility]  Reason for Visit/Diagnosis:   Unintentional weight loss   Loss of appetite      FUTURE VISIT INFORMATION:    Appointment Date: 7/11/2023     NOTES STATUS DETAILS   OFFICE NOTE from Referring Provider Internal 5/22/2023 OV with LOKESH Camp   OFFICE NOTE from Other Specialist Internal 4/14/2023 OV with [facility] DISCHARGE SUMMARY/  ED VISITS N/A    OPERATIVE REPORT N/A    MEDICATION LIST Internal         ENDOSCOPY  N/A    COLONOSCOPY N/A    ERCP N/A    EUS N/A    STOOL TESTING N/A    PERTINENT LABS N/A    PATHOLOGY REPORTS (RELATED) N/A    IMAGING (CT, MRI, EGD, MRCP, Small Bowel Follow Through/SBT, MR/CT Enterography) Internal 4/17/2023 US ABD

## 2023-05-24 ENCOUNTER — TELEPHONE (OUTPATIENT)
Dept: GASTROENTEROLOGY | Facility: CLINIC | Age: 68
End: 2023-05-24
Payer: MEDICARE

## 2023-05-24 NOTE — TELEPHONE ENCOUNTER
Caller: Jhoana  Reason for Reschedule/Cancellation (please be detailed, any staff messages or encounters to note?): Patient's  having surgery      Prior to reschedule please review:    Ordering Provider:Conrado    Sedation per order:CS    Does patient have any ASC Exclusions, please identify?: N      Notes on Cancelled Procedure:    Procedure:Lower Endoscopy [Colonoscopy]     Date: 6/8/23    Location:[facility] [place]; [address], [place] [address]    Surgeon: Tasha        Rescheduled: No-patient will call back

## 2023-06-15 DIAGNOSIS — K21.9 GASTROESOPHAGEAL REFLUX DISEASE WITHOUT ESOPHAGITIS: ICD-10-CM

## 2023-06-20 ENCOUNTER — ANCILLARY PROCEDURE (OUTPATIENT)
Dept: MAMMOGRAPHY | Facility: CLINIC | Age: 68
End: 2023-06-20
Attending: FAMILY MEDICINE
Payer: MEDICARE

## 2023-06-20 ENCOUNTER — ANCILLARY PROCEDURE (OUTPATIENT)
Dept: CT IMAGING | Facility: CLINIC | Age: 68
End: 2023-06-20
Attending: FAMILY MEDICINE
Payer: MEDICARE

## 2023-06-20 DIAGNOSIS — R63.0 LOSS OF APPETITE: ICD-10-CM

## 2023-06-20 DIAGNOSIS — Z12.31 VISIT FOR SCREENING MAMMOGRAM: ICD-10-CM

## 2023-06-20 DIAGNOSIS — R63.4 UNINTENTIONAL WEIGHT LOSS: ICD-10-CM

## 2023-06-20 PROCEDURE — 77067 SCR MAMMO BI INCL CAD: CPT | Mod: GC

## 2023-06-20 PROCEDURE — G1010 CDSM STANSON: HCPCS | Performed by: RADIOLOGY

## 2023-06-20 PROCEDURE — 74177 CT ABD & PELVIS W/CONTRAST: CPT | Mod: MG | Performed by: RADIOLOGY

## 2023-06-20 PROCEDURE — 77063 BREAST TOMOSYNTHESIS BI: CPT | Mod: GC

## 2023-06-20 PROCEDURE — 71260 CT THORAX DX C+: CPT | Mod: MG | Performed by: RADIOLOGY

## 2023-06-20 RX ORDER — PANTOPRAZOLE SODIUM 40 MG/1
TABLET, DELAYED RELEASE ORAL
Qty: 30 TABLET | Refills: 0 | Status: SHIPPED | OUTPATIENT
Start: 2023-06-20 | End: 2023-07-24

## 2023-06-20 RX ORDER — IOPAMIDOL 755 MG/ML
64 INJECTION, SOLUTION INTRAVASCULAR ONCE
Status: COMPLETED | OUTPATIENT
Start: 2023-06-20 | End: 2023-06-20

## 2023-06-20 RX ADMIN — IOPAMIDOL 64 ML: 755 INJECTION, SOLUTION INTRAVASCULAR at 13:48

## 2023-07-11 ENCOUNTER — PRE VISIT (OUTPATIENT)
Dept: GASTROENTEROLOGY | Facility: CLINIC | Age: 68
End: 2023-07-11

## 2023-07-11 ENCOUNTER — OFFICE VISIT (OUTPATIENT)
Dept: GASTROENTEROLOGY | Facility: CLINIC | Age: 68
End: 2023-07-11
Attending: FAMILY MEDICINE
Payer: MEDICARE

## 2023-07-11 VITALS
SYSTOLIC BLOOD PRESSURE: 129 MMHG | HEIGHT: 63 IN | BODY MASS INDEX: 20.21 KG/M2 | OXYGEN SATURATION: 100 % | WEIGHT: 114.1 LBS | DIASTOLIC BLOOD PRESSURE: 82 MMHG | HEART RATE: 72 BPM

## 2023-07-11 DIAGNOSIS — R63.4 UNINTENTIONAL WEIGHT LOSS: ICD-10-CM

## 2023-07-11 DIAGNOSIS — R63.0 LOSS OF APPETITE: ICD-10-CM

## 2023-07-11 PROCEDURE — 99204 OFFICE O/P NEW MOD 45 MIN: CPT | Performed by: INTERNAL MEDICINE

## 2023-07-11 ASSESSMENT — PAIN SCALES - GENERAL: PAINLEVEL: NO PAIN (0)

## 2023-07-11 NOTE — PROGRESS NOTES
GASTROENTEROLOGY NEW PATIENT CLINIC VISIT    CC/REFERRING MD:    Nelson Camp    REASON FOR CONSULTATION:   Weight loss and loss of appetitie    HISTORY OF PRESENT ILLNESS:    Jhoana Hernandez is 68 year old female who presents for evaluation of GERD and weight loss.  She reported 16 pound weight loss over 3 months to her primary care in May 2023.  CT chest abdomen and pelvis was performed in June 2023 which noted a 3 mm right upper lung nodule, no other concerning findings.  Laboratory studies were obtained in May 2023 including basic metabolic panel, liver tests, thyroid studies, CBC which were unremarkable.     Symptoms of reflux began approximately 1 year ago.  She reports that she had some flulike symptoms and then developed heartburn and sore throat following this.  She stopped eating tomatoes, stop drinking coffee and made many dietary changes.  She reports this was around the time the weight loss started.  She notes that her current weight is around her baseline when she was younger.  She was placed on pantoprazole which improved the symptoms.  She has had occasional dysphagia in the past.  There are no changes in bowel habits.  Colonoscopy was performed in 2012 which was normal and 10-year repeat was recommended at that time.  There is no family history of GI malignancy.    PHYSICAL EXAMINATION:  Constitutional: aaox3, cooperative, pleasant, not dyspneic/diaphoretic, no acute distress  Vitals reviewed: LMP  (LMP Unknown)   Wt:   Wt Readings from Last 2 Encounters:   05/22/23 52.6 kg (116 lb)   04/14/23 56.5 kg (124 lb 9.6 oz)      Eyes: Sclera anicteric/injected  Ears/nose/mouth/throat: Normal oropharynx without ulcers or exudate, mucus membranes moist, hearing intact  Neck: supple, thyroid normal size  CV: No edema  Respiratory: Unlabored breathing  Lymph: No notable submandibular, supraclavicular or inguinal lymphadenopathy  Abd:  Nondistended, no masses, no hepatosplenomegaly, nontender,  no peritoneal signs  Skin: warm, perfused, no jaundice  Psych: Normal affect  MSK: Normal gait      Pertinent lab and radiology studies have been reviewed.     ASSESSMENT/PLAN:    Jhoana Hernandez is a 68 year old female who presents for evaluation of reflux and weight loss.  She has a 1 year history of reflux which is responsive to PPI but is persistent.  She also has had some episodes of occasional dysphagia in the past.  I think it would be reasonable to do an upper endoscopy for further evaluation to look for underlying structural sources and rule out complications of reflux such as Manriquez's esophagus.  She will continue the pantoprazole to complete a course of therapy and then try discontinuing this.  I have suggested Tums or Pepcid as needed to help manage any rebound symptoms.      Her weight loss of 16 pounds is concerning but there are some dietary changes associated.  Laboratory studies were reassuring and CT scan is also reassuring.  We will further evaluate this with upper endoscopy and colonoscopy.  If these tests are unremarkable, then I do not think anything further is needed at this point.  However, if there is further weight loss and early satiety then a gastric emptying scan could be considered in the future.    RTC PRN    Thank you for this consultation.  It was a pleasure to participate in the care of this patient; please contact us with any further questions.  A total of 45 minutes was spent with reviewing the chart, discussing with the patient, documentation and coordination of care.    This note was created with voice recognition software, and while reviewed for accuracy, typos may remain.     Del Cordova MD  Adjunct  of Medicine  Division of Gastroenterology, Hepatology and Nutrition  [facility]  [phone]

## 2023-07-11 NOTE — NURSING NOTE
"Chief Complaint   Patient presents with     New Patient     New consult for unintended weight loss, loss of appetite, and acid reflux.     She requests these members of her care team be copied on today's visit information:  [place]  [address] [place] [address]    Vitals:    07/11/23 0743   BP: 129/82   BP Location: Left arm   Patient Position: Sitting   Cuff Size: Adult Regular   Pulse: 72   SpO2: 100%   Weight: 51.8 kg (114 lb 1.6 oz)   Height: 1.6 m (5' 3\")     Body mass index is 20.21 kg/m .    Medications were reconciled.        Renetta Gregory CMA    "

## 2023-07-11 NOTE — PATIENT INSTRUCTIONS
If you get increased symptoms when you go off the pantoprazole, then try over the counter tums or pepcid as needed    Schedule upper endoscopy and colonoscopy

## 2023-07-11 NOTE — LETTER
7/11/2023         RE: Jhoana Hernandez  [address] [facility] [address]        Dear Colleague,    Thank you for referring your patient, Jhoana Hernandez, to the [facility]. Please see a copy of my visit note below.          GASTROENTEROLOGY NEW PATIENT CLINIC VISIT    CC/REFERRING MD:    Nelson Camp    REASON FOR CONSULTATION:   Weight loss and loss of appetitie    HISTORY OF PRESENT ILLNESS:    Jhoana Hernandez is 68 year old female who presents for evaluation of GERD and weight loss.  She reported 16 pound weight loss over 3 months to her primary care in May 2023.  CT chest abdomen and pelvis was performed in June 2023 which noted a 3 mm right upper lung nodule, no other concerning findings.  Laboratory studies were obtained in May 2023 including basic metabolic panel, liver tests, thyroid studies, CBC which were unremarkable.     Symptoms of reflux began approximately 1 year ago.  She reports that she had some flulike symptoms and then developed heartburn and sore throat following this.  She stopped eating tomatoes, stop drinking coffee and made many dietary changes.  She reports this was around the time the weight loss started.  She notes that her current weight is around her baseline when she was younger.  She was placed on pantoprazole which improved the symptoms.  She has had occasional dysphagia in the past.  There are no changes in bowel habits.  Colonoscopy was performed in 2012 which was normal and 10-year repeat was recommended at that time.  There is no family history of GI malignancy.    PHYSICAL EXAMINATION:  Constitutional: aaox3, cooperative, pleasant, not dyspneic/diaphoretic, no acute distress  Vitals reviewed: LMP  (LMP Unknown)   Wt:   Wt Readings from Last 2 Encounters:   05/22/23 52.6 kg (116 lb)   04/14/23 56.5 kg (124 lb 9.6 oz)      Eyes: Sclera anicteric/injected  Ears/nose/mouth/throat: Normal oropharynx without ulcers or  exudate, mucus membranes moist, hearing intact  Neck: supple, thyroid normal size  CV: No edema  Respiratory: Unlabored breathing  Lymph: No notable submandibular, supraclavicular or inguinal lymphadenopathy  Abd:  Nondistended, no masses, no hepatosplenomegaly, nontender, no peritoneal signs  Skin: warm, perfused, no jaundice  Psych: Normal affect  MSK: Normal gait      Pertinent lab and radiology studies have been reviewed.     ASSESSMENT/PLAN:    Jhoana Hernandez is a 68 year old female who presents for evaluation of reflux and weight loss.  She has a 1 year history of reflux which is responsive to PPI but is persistent.  She also has had some episodes of occasional dysphagia in the past.  I think it would be reasonable to do an upper endoscopy for further evaluation to look for underlying structural sources and rule out complications of reflux such as Manriquez's esophagus.  She will continue the pantoprazole to complete a course of therapy and then try discontinuing this.  I have suggested Tums or Pepcid as needed to help manage any rebound symptoms.      Her weight loss of 16 pounds is concerning but there are some dietary changes associated.  Laboratory studies were reassuring and CT scan is also reassuring.  We will further evaluate this with upper endoscopy and colonoscopy.  If these tests are unremarkable, then I do not think anything further is needed at this point.  However, if there is further weight loss and early satiety then a gastric emptying scan could be considered in the future.    RTC PRN    Thank you for this consultation.  It was a pleasure to participate in the care of this patient; please contact us with any further questions.  A total of 45 minutes was spent with reviewing the chart, discussing with the patient, documentation and coordination of care.    This note was created with voice recognition software, and while reviewed for accuracy, typos may remain.     Del Cordova,  MD  Adjunct  of Medicine  Division of Gastroenterology, Hepatology and Nutrition  [facility]  [phone]      Again, thank you for allowing me to participate in the care of your patient.        Sincerely,        Del Cordova MD

## 2023-07-24 DIAGNOSIS — K21.9 GASTROESOPHAGEAL REFLUX DISEASE WITHOUT ESOPHAGITIS: ICD-10-CM

## 2023-07-24 RX ORDER — PANTOPRAZOLE SODIUM 40 MG/1
40 TABLET, DELAYED RELEASE ORAL DAILY
Qty: 30 TABLET | Refills: 0 | Status: SHIPPED | OUTPATIENT
Start: 2023-07-24 | End: 2024-05-08

## 2023-07-24 NOTE — TELEPHONE ENCOUNTER
Medication Question or Refill        What medication are you calling about (include dose and sig)?: protonix white pills    Preferred Pharmacy:  [id number] - [place], [place] - [address] [facility]  [address] [facility] [address]  Phone: [phone] Fax: [phone]      Controlled Substance Agreement on file:   CSA -- Patient Level:    CSA: None found at the patient level.       Who prescribed the medication?: Camp    Do you need a refill? Yes    When did you use the medication last? n/a    Patient offered an appointment? No    Do you have any questions or concerns?  No      Okay to leave a detailed message?: Yes at Cell number on file:    Telephone Information:   Mobile [phone]

## 2023-08-26 ENCOUNTER — OFFICE VISIT (OUTPATIENT)
Dept: URGENT CARE | Facility: URGENT CARE | Age: 68
End: 2023-08-26
Payer: MEDICARE

## 2023-08-26 VITALS
BODY MASS INDEX: 19.37 KG/M2 | RESPIRATION RATE: 16 BRPM | HEART RATE: 77 BPM | SYSTOLIC BLOOD PRESSURE: 140 MMHG | DIASTOLIC BLOOD PRESSURE: 86 MMHG | WEIGHT: 109.38 LBS | TEMPERATURE: 97 F | OXYGEN SATURATION: 99 %

## 2023-08-26 DIAGNOSIS — J31.0 CHRONIC RHINITIS: ICD-10-CM

## 2023-08-26 DIAGNOSIS — H60.8X3 CHRONIC ECZEMATOUS OTITIS EXTERNA OF BOTH EARS: ICD-10-CM

## 2023-08-26 DIAGNOSIS — H61.22 IMPACTED CERUMEN OF LEFT EAR: Primary | ICD-10-CM

## 2023-08-26 PROCEDURE — 99213 OFFICE O/P EST LOW 20 MIN: CPT | Performed by: FAMILY MEDICINE

## 2023-08-26 RX ORDER — PREDNISOLONE ACETATE 10 MG/ML
SUSPENSION/ DROPS OPHTHALMIC
Qty: 10 ML | Refills: 0 | Status: SHIPPED | OUTPATIENT
Start: 2023-08-26

## 2023-08-26 NOTE — PROGRESS NOTES
Jhoana Hernandez is a 68 year old female who comes in today for ear pain    At last physical had some wax removed    Off and on symptoms since then    Has ENT appointment in 2-3 weeks    Has allergies recently also    Tried debrox at home     Then prednisone drops    History of ear issues    ROS    Physical Exam  Constitutional:       Appearance: Normal appearance.   HENT:      Head: Normocephalic and atraumatic.      Ears:      Comments: Right tympanic membrane looks normal, only minimal wax present.  On left, tympanic membrane not well seen.  With consent we used gentle irrigation to remove some wax.  After this, tympanic membrane and canal okay.  Only small amount of wax remained.      Nose:      Comments: Mild swelling of nasal mucosa     Mouth/Throat:      Mouth: Mucous membranes are moist.      Pharynx: Oropharynx is clear.   Eyes:      Conjunctiva/sclera: Conjunctivae normal.       ASSESSMENT / PLAN:  (H61.22) Impacted cerumen of left ear  (primary encounter diagnosis)  Comment: removed some wax here, patient felt better   Plan: follow up with ENT as planned     (J31.0) Chronic rhinitis  Comment: advise patient restart the nasonex that she has at home  Plan: also continue daily loratadine    (H60.8X3) Chronic eczematous otitis externa of both ears  Comment: refill this for patient.  It has helped in past.   Plan: prednisoLONE acetate (PRED FORTE) 1 %         ophthalmic suspension           Follow up with ENT     Call/ be seen sooner if needed       I reviewed the patient's medications, allergies, medical history, family history, and social history.    Quan Paul MD

## 2023-08-26 NOTE — PATIENT INSTRUCTIONS
Restart the nasonex daily    Prednisolone drops    Continue claritin    Follow up with ENT as planned     Debrox as needed

## 2023-09-13 ENCOUNTER — OFFICE VISIT (OUTPATIENT)
Dept: OTOLARYNGOLOGY | Facility: CLINIC | Age: 68
End: 2023-09-13
Payer: MEDICARE

## 2023-09-13 VITALS — HEART RATE: 75 BPM | OXYGEN SATURATION: 100 % | DIASTOLIC BLOOD PRESSURE: 71 MMHG | SYSTOLIC BLOOD PRESSURE: 158 MMHG

## 2023-09-13 DIAGNOSIS — H61.23 BILATERAL IMPACTED CERUMEN: Primary | ICD-10-CM

## 2023-09-13 PROCEDURE — 69210 REMOVE IMPACTED EAR WAX UNI: CPT | Performed by: OTOLARYNGOLOGY

## 2023-09-13 ASSESSMENT — ENCOUNTER SYMPTOMS
BLURRED VISION: 0
NAUSEA: 0
DOUBLE VISION: 0
CONSTITUTIONAL NEGATIVE: 1

## 2023-09-13 NOTE — NURSING NOTE
"Jhoana Hernandez's goals for this visit include:   Chief Complaint   Patient presents with    Ear Problem     Follow-up on impacted cerumen of left ear. Patient was seen in urgent care on 8/26/23 and was recommended to follow-up back with ENT and restart nasonex with debrox ear drops. Patient is only using Claritin, did not restart nasonex. Pressure on and off in bilateral ears.       PCP: Nelson Camp    Referring Provider:  No referring provider defined for this encounter.      Initial BP (!) 158/71 (BP Location: Right arm, Patient Position: Sitting, Cuff Size: Adult Small)   Pulse 75   LMP  (LMP Unknown)   SpO2 100%   Breastfeeding No  Estimated body mass index is 19.37 kg/m  as calculated from the following:    Height as of 7/11/23: 1.6 m (5' 3\").    Weight as of 8/26/23: 49.6 kg (109 lb 6 oz).    Medication Reconciliation: complete    Do you need any medication refills at today's visit? none    BRANDAN Reed  Rheumatology/Infectious disease  [facility]   [phone]      "

## 2023-09-13 NOTE — LETTER
9/13/2023         RE: Jhoana Hernandez  [address] [facility] [address]        Dear Colleague,    Thank you for referring your patient, Jhoana Hernandez, to the [facility]. Please see a copy of my visit note below.    HPI    Jhoana Hernandez's chief complaint for this visit includes:  Chief Complaint   Patient presents with     Ear Problem     Follow-up on impacted cerumen of left ear. Patient was seen in urgent care on 8/26/23 and was recommended to follow-up back with ENT and restart nasonex with debrox ear drops. Patient is only using Claritin, did not restart nasonex. Pressure on and off in bilateral ears.     PCP: Nelson Camp    Referring Provider:  No referring provider defined for this encounter.    BP (!) 158/71 (BP Location: Right arm, Patient Position: Sitting, Cuff Size: Adult Small)   Pulse 75   LMP  (LMP Unknown)   SpO2 100%   Breastfeeding No     She is here for ear wax removal. Denies any otorrhea, otalgia, or vertigo.    Review of Systems   Constitutional: Negative.    HENT:  Positive for hearing loss. Negative for congestion, ear discharge, ear pain and nosebleeds.    Eyes:  Negative for blurred vision and double vision.   Gastrointestinal:  Negative for nausea.   Skin: Negative.          Physical Exam  Vitals and nursing note reviewed.   Constitutional:       Appearance: Normal appearance.   HENT:      Head: Normocephalic and atraumatic.      Right Ear: Tympanic membrane, ear canal and external ear normal. There is impacted cerumen.      Left Ear: Tympanic membrane, ear canal and external ear normal. There is impacted cerumen.   Neurological:      Mental Status: She is alert.       Ear wax removal: The patient was seen in the room. An informed consent was obtained from the patient. Her ears were evaluated under the microscope. Right ear canal was blocked 70% with ear wax that was suctioned and removed with an alligator. The left  ear canal was blocked 800% with ear wax that was suctioned with a 7 tip. She tolerated the procedure well and left the room no complications.  F/up in 6 months.        Again, thank you for allowing me to participate in the care of your patient.        Sincerely,        Adrian Lagunas MD

## 2023-09-27 ENCOUNTER — TELEPHONE (OUTPATIENT)
Dept: GASTROENTEROLOGY | Facility: CLINIC | Age: 68
End: 2023-09-27
Payer: MEDICARE

## 2023-09-27 NOTE — TELEPHONE ENCOUNTER
[facility] Call Center    Phone Message    May a detailed message be left on voicemail: yes     Reason for Call: Other: Pt is requesting a call back to discuss if they need to still need an egd. Pt stated their symptoms have improved     Action Taken: Message routed to:  [facility]  GI    Travel Screening: Not Applicable

## 2023-09-28 NOTE — TELEPHONE ENCOUNTER
"Spoke with patient.   Patient states that she has been off of Protonix for a \"good month\" and has only taken rolaids 4x (last about 3-4 weeks ago).   She notes that her allergies have been prominent in the spring and fall with post-nasal drip and throat irritation symptoms.   Patient has been cautious with trigger foods and her weight has been stable.   Patient inquiring if still necessary to complete endoscopy. Iterated provider assessment/plan from visit in July.   Patient states she feel pretty much \"back to normal\" and at this point, she would prefer to wait on scopes.   Patient states that she didn't think she could do the endoscopy and colonoscopy at the same time. Also has some concerns re: colonoscopy prep and citric acid irritating her throat.   Informed patient that summary of conversation will be forwarded to the provider and will notify her of any recs.     Tonia Villegas RN    "

## 2023-09-28 NOTE — TELEPHONE ENCOUNTER
Del Cordova MD Dolney, Kristin, RN  Caller: Unspecified (Yesterday,  1:47 PM)  If there is no more dysphagia or reflux then it would seem fine to hold off.  Alternative noninvasive test she could consider would be barium esophagram.  She could also do a follow up with DRU in a few months if she wanted to discuss further.    Called patient and relayed above provider response/recommendations. Patient stated she will hold off on any testing for now, but will call and make follow up if there are concerns and/or would like to proceed with barium esophagram.     Tonia Villegas, HENRIQUE

## 2024-01-19 DIAGNOSIS — Z91.018 ALLERGY, FOOD: ICD-10-CM

## 2024-01-19 RX ORDER — EPINEPHRINE 0.3 MG/.3ML
0.3 INJECTION SUBCUTANEOUS PRN
Qty: 2 EACH | Refills: 0 | Status: SHIPPED | OUTPATIENT
Start: 2024-01-19

## 2024-02-13 ENCOUNTER — OFFICE VISIT (OUTPATIENT)
Dept: OTOLARYNGOLOGY | Facility: CLINIC | Age: 69
End: 2024-02-13
Payer: MEDICARE

## 2024-02-13 ENCOUNTER — OFFICE VISIT (OUTPATIENT)
Dept: AUDIOLOGY | Facility: CLINIC | Age: 69
End: 2024-02-13
Payer: MEDICARE

## 2024-02-13 VITALS — HEART RATE: 82 BPM | SYSTOLIC BLOOD PRESSURE: 133 MMHG | DIASTOLIC BLOOD PRESSURE: 83 MMHG

## 2024-02-13 DIAGNOSIS — H61.23 BILATERAL IMPACTED CERUMEN: Primary | ICD-10-CM

## 2024-02-13 DIAGNOSIS — H90.3 SENSORINEURAL HEARING LOSS (SNHL) OF BOTH EARS: ICD-10-CM

## 2024-02-13 DIAGNOSIS — J30.1 SEASONAL ALLERGIC RHINITIS DUE TO POLLEN: ICD-10-CM

## 2024-02-13 DIAGNOSIS — H90.3 SENSORINEURAL HEARING LOSS (SNHL) OF BOTH EARS: Primary | ICD-10-CM

## 2024-02-13 DIAGNOSIS — H61.23 BILATERAL IMPACTED CERUMEN: ICD-10-CM

## 2024-02-13 PROCEDURE — 92550 TYMPANOMETRY & REFLEX THRESH: CPT | Performed by: AUDIOLOGIST

## 2024-02-13 PROCEDURE — 99214 OFFICE O/P EST MOD 30 MIN: CPT | Mod: 25 | Performed by: OTOLARYNGOLOGY

## 2024-02-13 PROCEDURE — 92504 EAR MICROSCOPY EXAMINATION: CPT | Performed by: OTOLARYNGOLOGY

## 2024-02-13 PROCEDURE — 92557 COMPREHENSIVE HEARING TEST: CPT | Performed by: AUDIOLOGIST

## 2024-02-13 ASSESSMENT — ENCOUNTER SYMPTOMS
SINUS PAIN: 1
EYES NEGATIVE: 1
HEARTBURN: 1
RESPIRATORY NEGATIVE: 1
CONSTITUTIONAL NEGATIVE: 1

## 2024-02-13 NOTE — PROGRESS NOTES
Chief Complaint   Patient presents with    Follow Up     Ear cleaning. Having Tinnitus mild. Added Audio for today.       PCP: Nelson Camp     Referring Provider: No ref. 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     HPI  Pleasant 69 year old female presents today as a(n) established patient for cerumen removal. She reports the following intermittent symptoms: tinnitus, pain in the left ear, and pain in the right nostril. Her right nostril pain gets better or goes away completely when she lays down, and her tinnitus does not bother her much. She has year round allergies, food allergies, and a family history of allergies.She denies facial pressure. She reports post nasal drip, and acid reflux. Her acid reflux has gotten better from following a reflux diet. She wears Invisalign. Her left ear plugs every three months. She uses Nasonex and Astelin, but could not use Flonase due to the fragrance of it.    Review of Systems   Constitutional: Negative.    HENT:  Positive for congestion, ear pain, sinus pain and tinnitus. Negative for ear discharge and hearing loss.    Eyes: Negative.     Respiratory: Negative.     Gastrointestinal:  Positive for heartburn.   Skin: Negative.    Endo/Heme/Allergies:  Positive for environmental allergies.       Physical Exam  Vitals and nursing note reviewed.   Constitutional:       Appearance: Normal appearance.   HENT:      Head: Normocephalic and atraumatic.      Jaw: There is normal jaw occlusion.      Right Ear: Hearing and ear canal normal. No middle ear effusion. There is impacted cerumen. Tympanic membrane is scarred.      Left Ear: Hearing and ear canal normal.  No middle ear effusion. There is impacted cerumen. Tympanic membrane is scarred.      Nose: Congestion present. No mucosal edema or rhinorrhea.      Right Nostril: No occlusion.      Left Nostril: No occlusion.      Right Turbinates: Swollen. Not enlarged.      Left Turbinates: Swollen. Not enlarged.      Right Sinus: No maxillary sinus tenderness or frontal sinus tenderness.      Left Sinus: No maxillary sinus tenderness or frontal sinus tenderness.      Mouth/Throat:      Mouth: Mucous membranes are moist.      Pharynx: Uvula midline.   Eyes:      Extraocular Movements: Extraocular movements intact.      Pupils: Pupils are equal, round, and reactive to light.   Neurological:      Mental Status: She is alert.       Ear wax removal: The patient was seen in the room. An informed consent was obtained from the patient. Her ears were evaluated under the microscope. Right ear canal was blocked 30% with ear wax that was suctioned and removed with an alligator. The left ear canal was blocked 70% with ear wax that was suctioned with a 7 tip. She tolerated the procedure well and left the room no complications.    AUDIOGRAM: The patient underwent an audiogram today.  Right: Speech reception threshold is 25 dB with 100% word recognition. Tympanogram A type.  Left: Speech reception threshold is 25 dB with 100% word recognition. Tympanogram As type.    A/P  There are no signs of eustachian tube dysfunction. The  patient has alternating congestion due to swollen turbinates. The patient is recommended to use OTC Nasonex, and further treatment options are discussed if this does not relieve her symptoms. Audiogram was independently reviewed and discussed in detail with the patient. The patient's concerns and questions were addressed.    Follow up in clinic in in April, or earlier if the patient has plugged up sensations in their ears.    Scribe/Staff:    Scribe Disclosure:   I, Angeles Chang, am serving as a scribe; to document services personally performed by Adrian Lagunas MD based on data collection and the provider's statements to me.     Provider Disclosure:  I agree with above History, Review of Systems, Physical exam and Plan.  I have reviewed the content of the documentation and have edited it as needed. I have personally performed the services documented here and the documentation accurately represents those services and the decisions I have made.      Electronically signed by:  Adrian Lagunas MD

## 2024-02-13 NOTE — PROGRESS NOTES
AUDIOLOGY REPORT    SUMMARY: Audiology visit completed. See audiogram for results.    RECOMMENDATIONS: Follow-up with ENT.    Ebenezer Danielle  Doctor of Audiology  [place] License # [id number]

## 2024-02-13 NOTE — LETTER
2/13/2024         RE: Jhoana Hernandez  [address] [place] [address]        Dear Colleague,    Thank you for referring your patient, Jhoana Hernandez, to the [facility]. Please see a copy of my visit note below.    Chief Complaint   Patient presents with     Follow Up     Ear cleaning. Having Tinnitus mild. Added Audio for today.       PCP: Nelson Camp     Referring Provider: No ref. 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     HPI  Pleasant 69 year old female presents today as a(n) established patient for cerumen removal. She reports the following intermittent symptoms: tinnitus, pain in the left ear, and pain in the right nostril. Her right nostril pain gets better or goes away completely when she lays down, and her tinnitus does not bother her much. She has year round allergies, food allergies, and a family history of allergies.She denies facial pressure. She reports post nasal drip, and acid reflux. Her acid reflux has gotten better from following a reflux diet. She wears Invisalign. Her left ear plugs  every three months. She uses Nasonex and Astelin, but could not use Flonase due to the fragrance of it.    Review of Systems   Constitutional: Negative.    HENT:  Positive for congestion, ear pain, sinus pain and tinnitus. Negative for ear discharge and hearing loss.    Eyes: Negative.    Respiratory: Negative.     Gastrointestinal:  Positive for heartburn.   Skin: Negative.    Endo/Heme/Allergies:  Positive for environmental allergies.       Physical Exam  Vitals and nursing note reviewed.   Constitutional:       Appearance: Normal appearance.   HENT:      Head: Normocephalic and atraumatic.      Jaw: There is normal jaw occlusion.      Right Ear: Hearing and ear canal normal. No middle ear effusion. There is impacted cerumen. Tympanic membrane is scarred.      Left Ear: Hearing and ear canal normal.  No middle ear effusion. There is impacted cerumen. Tympanic membrane is scarred.      Nose: Congestion present. No mucosal edema or rhinorrhea.      Right Nostril: No occlusion.      Left Nostril: No occlusion.      Right Turbinates: Swollen. Not enlarged.      Left Turbinates: Swollen. Not enlarged.      Right Sinus: No maxillary sinus tenderness or frontal sinus tenderness.      Left Sinus: No maxillary sinus tenderness or frontal sinus tenderness.      Mouth/Throat:      Mouth: Mucous membranes are moist.      Pharynx: Uvula midline.   Eyes:      Extraocular Movements: Extraocular movements intact.      Pupils: Pupils are equal, round, and reactive to light.   Neurological:      Mental Status: She is alert.       Ear wax removal: The patient was seen in the room. An informed consent was obtained from the patient. Her ears were evaluated under the microscope. Right ear canal was blocked 30% with ear wax that was suctioned and removed with an alligator. The left ear canal was blocked 70% with ear wax that was suctioned with a 7 tip. She tolerated the procedure well and left the room no complications.    AUDIOGRAM:  The patient underwent an audiogram today.  Right: Speech reception threshold is 25 dB with 100% word recognition. Tympanogram A type.  Left: Speech reception threshold is 25 dB with 100% word recognition. Tympanogram As type.    A/P  There are no signs of eustachian tube dysfunction. The patient has alternating congestion due to swollen turbinates. The patient is recommended to use OTC Nasonex, and further treatment options are discussed if this does not relieve her symptoms. Audiogram was independently reviewed and discussed in detail with the patient. The patient's concerns and questions were addressed.    Follow up in clinic in in April, or earlier if the patient has plugged up sensations in their ears.    Scribe/Staff:    Scribe Disclosure:   I, Angeles Chang, am serving as a scribe; to document services personally performed by Adrian Lagunas MD based on data collection and the provider's statements to me.     Provider Disclosure:  I agree with above History, Review of Systems, Physical exam and Plan.  I have reviewed the content of the documentation and have edited it as needed. I have personally performed the services documented here and the documentation accurately represents those services and the decisions I have made.      Electronically signed by:  Adrian Lagunas MD       Jhoana Hernandez's chief complaint for this visit includes:  Chief Complaint   Patient presents with     Follow Up     Ear cleaning. Having Tinnitus mild. Added Audio for today.      PCP: Nelson Camp    Referring Provider:  No referring provider defined for this encounter.    /83   Pulse 82   LMP  (LMP Unknown)             Again, thank you for allowing me to participate in the care of your patient.        Sincerely,        Adrian Lagunas MD

## 2024-02-13 NOTE — NURSING NOTE
Jhoana Hernandez's chief complaint for this visit includes:  Chief Complaint   Patient presents with    Follow Up     Ear cleaning. Having Tinnitus mild. Added Audio for today.      PCP: Nelson Camp    Referring Provider:  No referring provider defined for this encounter.    /83   Pulse 82   LMP  (LMP Unknown)

## 2024-05-08 ENCOUNTER — OFFICE VISIT (OUTPATIENT)
Dept: OTOLARYNGOLOGY | Facility: CLINIC | Age: 69
End: 2024-05-08
Payer: MEDICARE

## 2024-05-08 DIAGNOSIS — H90.3 SENSORINEURAL HEARING LOSS (SNHL) OF BOTH EARS: ICD-10-CM

## 2024-05-08 DIAGNOSIS — H61.23 BILATERAL IMPACTED CERUMEN: Primary | ICD-10-CM

## 2024-05-08 DIAGNOSIS — J30.1 SEASONAL ALLERGIC RHINITIS DUE TO POLLEN: ICD-10-CM

## 2024-05-08 PROCEDURE — 99213 OFFICE O/P EST LOW 20 MIN: CPT | Mod: 25 | Performed by: OTOLARYNGOLOGY

## 2024-05-08 PROCEDURE — 69210 REMOVE IMPACTED EAR WAX UNI: CPT | Performed by: OTOLARYNGOLOGY

## 2024-05-08 ASSESSMENT — ENCOUNTER SYMPTOMS
CONSTITUTIONAL NEGATIVE: 1
HEADACHES: 1
RESPIRATORY NEGATIVE: 1
SINUS PAIN: 1
EYES NEGATIVE: 1
PSYCHIATRIC NEGATIVE: 1
HEARTBURN: 1

## 2024-05-08 ASSESSMENT — PAIN SCALES - GENERAL: PAINLEVEL: NO PAIN (0)

## 2024-05-08 NOTE — LETTER
5/8/2024         RE: Jhoana Hernandez  [address] [facility] [address]        Dear Colleague,    Thank you for referring your patient, Jhoana Hernandez, to the [facility]. Please see a copy of my visit note below.    Chief Complaint   Patient presents with     Cerumen Impaction     Ear cleaning      PCP: Nelson Camp     Referring Provider: No ref. provider found    LMP  (LMP Unknown)     ENT Problem List:  Patient Active Problem List   Diagnosis     CARDIOVASCULAR SCREENING; LDL GOAL LESS THAN 160     Lactose intolerance     Seasonal allergic rhinitis     Allergy, food     Advanced directives, counseling/discussion     Subclinical hypothyroidism     Osteopenia     Hollenhorst plaque, left eye     Family history of breast cancer     Chronic eczematous otitis externa of both ears      Current Medications:  Current Outpatient Medications   Medication Sig Dispense Refill     EPINEPHrine (ANY BX GENERIC EQUIV) 0.3 MG/0.3ML injection 2-pack Inject 0.3 mLs (0.3 mg) into the muscle as needed for anaphylaxis (or exposure to trigger) 2 each 0     Loratadine (CLARITIN PO) Take 1 tablet by mouth as needed       mometasone (NASONEX) 50 MCG/ACT nasal spray Spray 2 sprays into both nostrils daily for allergy prevention. (Patient not taking: Reported on 8/26/2023) 17 g 11     prednisoLONE acetate (PRED FORTE) 1 % ophthalmic suspension 4 drops into both ears twice as needed (Patient not taking: Reported on 9/13/2023) 10 mL 0     No current facility-administered medications for this visit.     HPI  Pleasant 69 year old female presents today as a(n) established patient for an ear cleaning. She is experiencing allergies. She says that her right side is likely to be infected because it feels plugged. She mentioned that suction cleaning can cause some temporary pain at the previous cleaning. She reports intermittent tinnitus, with associated hearing loss. She says that she is  experiencing breathing through the nose, due to allergies. She has tried out some sprays and Claritin, which helps a little.     Review of Systems   Constitutional: Negative.    HENT:  Positive for ear pain, hearing loss, sinus pain and tinnitus. Negative for congestion.    Eyes: Negative.    Respiratory: Negative.     Gastrointestinal:  Positive for heartburn.   Skin: Negative.    Neurological:  Positive for headaches.   Endo/Heme/Allergies:  Positive for environmental allergies.   Psychiatric/Behavioral: Negative.     All other systems reviewed and are negative.      Physical Exam  Vitals and nursing note reviewed.   Constitutional:       Appearance: Normal appearance.   HENT:      Head: Normocephalic and atraumatic.      Right Ear: Hearing, ear canal and external ear normal. No middle ear effusion. Tympanic membrane is scarred.      Left Ear: Hearing, ear canal and external ear normal.  No middle ear effusion. Tympanic membrane is scarred.      Mouth/Throat:      Mouth: Mucous membranes are moist.   Eyes:      Extraocular Movements: Extraocular movements intact.      Pupils: Pupils are equal, round, and reactive to light.   Neurological:      Mental Status: She is alert.       Ear wax removal: The patient was seen in the room. An informed consent was obtained from the patient. His ears were evaluated under the microscope. Right ear canal was clean and the tympanic membrane was intact. The left ear canal was blocked 70% with ear wax that was suctioned.She tolerated the procedure well and left the room no complications.     AUDIOGRAM: The patient underwent an audiogram (02/03/2024).  Right: Speech reception threshold is 25 dB with 100% word recognition. Tympanogram A type.  Left: Speech reception threshold is 25 dB with 100% word recognition. Tympanogram As type.    A/P  Audiogram/Imaging was independently reviewed and discussed in detail with the patient. She presents with right side ear pain, but there is little  to no wax within the ear upon exam. The left side had some cerumen that was removed with suction. She tolerated thee cleaning well. The patient has been encouraged to reach out if there are any new or concerning changes before her next routine cleaning. All of the patient's questions were answered to their satisfaction.     Follow up in clinic in 4-months.    Scribe/Staff:    Scribe Disclosure:   I, Araceli Arrington, am serving as a scribe; to document services personally performed by Adrian Lagunas MD based on data collection and the provider's statements to me.     Provider Disclosure:  I agree with above History, Review of Systems, Physical exam and Plan.  I have reviewed the content of the documentation and have edited it as needed. I have personally performed the services documented here and the documentation accurately represents those services and the decisions I have made.      Electronically signed by:  Adrian Lagunas MD         Again, thank you for allowing me to participate in the care of your patient.        Sincerely,        Adrian Lagunas MD

## 2024-05-08 NOTE — NURSING NOTE
Jhoana Hernandez's goals for this visit include:   Chief Complaint   Patient presents with    Cerumen Impaction     Ear cleaning       She requests these members of her care team be copied on today's visit information:     PCP: Nelson Camp    Referring Provider:  No referring provider defined for this encounter.    LMP  (LMP Unknown)     Do you need any medication refills at today's visit? No    Christina Howard RN

## 2024-05-08 NOTE — PROGRESS NOTES
Chief Complaint   Patient presents with    Cerumen Impaction     Ear cleaning      PCP: Nelson Camp     Referring Provider: No ref. provider found    LMP  (LMP Unknown)     ENT Problem List:  Patient Active Problem List   Diagnosis    CARDIOVASCULAR SCREENING; LDL GOAL LESS THAN 160    Lactose intolerance    Seasonal allergic rhinitis    Allergy, food    Advanced directives, counseling/discussion    Subclinical hypothyroidism    Osteopenia    Hollenhorst plaque, left eye    Family history of breast cancer    Chronic eczematous otitis externa of both ears      Current Medications:  Current Outpatient Medications   Medication Sig Dispense Refill    EPINEPHrine (ANY BX GENERIC EQUIV) 0.3 MG/0.3ML injection 2-pack Inject 0.3 mLs (0.3 mg) into the muscle as needed for anaphylaxis (or exposure to trigger) 2 each 0    Loratadine (CLARITIN PO) Take 1 tablet by mouth as needed      mometasone (NASONEX) 50 MCG/ACT nasal spray Spray 2 sprays into both nostrils daily for allergy prevention. (Patient not taking: Reported on 8/26/2023) 17 g 11    prednisoLONE acetate (PRED FORTE) 1 % ophthalmic suspension 4 drops into both ears twice as needed (Patient not taking: Reported on 9/13/2023) 10 mL 0     No current facility-administered medications for this visit.     HPI  Pleasant 69 year old female presents today as a(n) established patient for an ear cleaning. She is experiencing allergies. She says that her right side is likely to be infected because it feels plugged. She mentioned that suction cleaning can cause some temporary pain at the previous cleaning. She reports intermittent tinnitus, with associated hearing loss. She says that she is experiencing breathing through the nose, due to allergies. She has tried out some sprays and Claritin, which helps a little.     Review of Systems   Constitutional: Negative.    HENT:  Positive for ear pain, hearing loss, sinus pain and tinnitus. Negative for congestion.    Eyes:  Negative.    Respiratory: Negative.     Gastrointestinal:  Positive for heartburn.   Skin: Negative.    Neurological:  Positive for headaches.   Endo/Heme/Allergies:  Positive for environmental allergies.   Psychiatric/Behavioral: Negative.     All other systems reviewed and are negative.      Physical Exam  Vitals and nursing note reviewed.   Constitutional:       Appearance: Normal appearance.   HENT:      Head: Normocephalic and atraumatic.      Right Ear: Hearing, ear canal and external ear normal. No middle ear effusion. Tympanic membrane is scarred.      Left Ear: Hearing, ear canal and external ear normal.  No middle ear effusion. Tympanic membrane is scarred.      Mouth/Throat:      Mouth: Mucous membranes are moist.   Eyes:      Extraocular Movements: Extraocular movements intact.      Pupils: Pupils are equal, round, and reactive to light.   Neurological:      Mental Status: She is alert.       Ear wax removal: The patient was seen in the room. An informed consent was obtained from the patient. His ears were evaluated under the microscope. Right ear canal was clean and the tympanic membrane was intact. The left ear canal was blocked 70% with ear wax that was suctioned.She tolerated the procedure well and left the room no complications.     AUDIOGRAM: The patient underwent an audiogram (02/03/2024).  Right: Speech reception threshold is 25 dB with 100% word recognition. Tympanogram A type.  Left: Speech reception threshold is 25 dB with 100% word recognition. Tympanogram As type.    A/P  Audiogram/Imaging was independently reviewed and discussed in detail with the patient. She presents with right side ear pain, but there is little to no wax within the ear upon exam. The left side had some cerumen that was removed with suction. She tolerated thee cleaning well. The patient has been encouraged to reach out if there are any new or concerning changes before her next routine cleaning. All of the patient's  questions were answered to their satisfaction.     Follow up in clinic in 4-months.    Scribe/Staff:    Scribe Disclosure:   I, Araceli Arrington, am serving as a scribe; to document services personally performed by Adrian Lagunas MD based on data collection and the provider's statements to me.     Provider Disclosure:  I agree with above History, Review of Systems, Physical exam and Plan.  I have reviewed the content of the documentation and have edited it as needed. I have personally performed the services documented here and the documentation accurately represents those services and the decisions I have made.      Electronically signed by:  Adrian Lagunas MD

## 2024-05-21 ENCOUNTER — PATIENT OUTREACH (OUTPATIENT)
Dept: CARE COORDINATION | Facility: CLINIC | Age: 69
End: 2024-05-21
Payer: MEDICARE

## 2024-06-03 ENCOUNTER — OFFICE VISIT (OUTPATIENT)
Dept: FAMILY MEDICINE | Facility: CLINIC | Age: 69
End: 2024-06-03
Payer: MEDICARE

## 2024-06-03 VITALS
TEMPERATURE: 97.1 F | HEIGHT: 64 IN | SYSTOLIC BLOOD PRESSURE: 131 MMHG | HEART RATE: 70 BPM | BODY MASS INDEX: 19.43 KG/M2 | WEIGHT: 113.8 LBS | OXYGEN SATURATION: 99 % | DIASTOLIC BLOOD PRESSURE: 79 MMHG

## 2024-06-03 DIAGNOSIS — Z13.220 SCREENING FOR LIPID DISORDERS: ICD-10-CM

## 2024-06-03 DIAGNOSIS — Z12.31 VISIT FOR SCREENING MAMMOGRAM: ICD-10-CM

## 2024-06-03 DIAGNOSIS — Z23 NEED FOR VACCINATION: ICD-10-CM

## 2024-06-03 DIAGNOSIS — Z13.1 SCREENING FOR DIABETES MELLITUS: ICD-10-CM

## 2024-06-03 DIAGNOSIS — E03.8 SUBCLINICAL HYPOTHYROIDISM: ICD-10-CM

## 2024-06-03 DIAGNOSIS — L82.1 SEBORRHEIC KERATOSIS: ICD-10-CM

## 2024-06-03 DIAGNOSIS — L84 CALLUS OF FOOT: ICD-10-CM

## 2024-06-03 DIAGNOSIS — Z12.11 SCREEN FOR COLON CANCER: ICD-10-CM

## 2024-06-03 DIAGNOSIS — M21.611 BUNION, RIGHT: ICD-10-CM

## 2024-06-03 DIAGNOSIS — Z00.00 ENCOUNTER FOR MEDICARE ANNUAL WELLNESS EXAM: Primary | ICD-10-CM

## 2024-06-03 LAB
CHOLEST SERPL-MCNC: 231 MG/DL
FASTING STATUS PATIENT QL REPORTED: YES
FASTING STATUS PATIENT QL REPORTED: YES
GLUCOSE SERPL-MCNC: 99 MG/DL (ref 70–99)
HDLC SERPL-MCNC: 76 MG/DL
LDLC SERPL CALC-MCNC: 133 MG/DL
NONHDLC SERPL-MCNC: 155 MG/DL
TRIGL SERPL-MCNC: 110 MG/DL
TSH SERPL DL<=0.005 MIU/L-ACNC: 5.01 UIU/ML (ref 0.3–4.2)

## 2024-06-03 PROCEDURE — 82947 ASSAY GLUCOSE BLOOD QUANT: CPT | Performed by: FAMILY MEDICINE

## 2024-06-03 PROCEDURE — 36415 COLL VENOUS BLD VENIPUNCTURE: CPT | Performed by: FAMILY MEDICINE

## 2024-06-03 PROCEDURE — 90480 ADMN SARSCOV2 VAC 1/ONLY CMP: CPT | Performed by: FAMILY MEDICINE

## 2024-06-03 PROCEDURE — 91320 SARSCV2 VAC 30MCG TRS-SUC IM: CPT | Performed by: FAMILY MEDICINE

## 2024-06-03 PROCEDURE — 80061 LIPID PANEL: CPT | Performed by: FAMILY MEDICINE

## 2024-06-03 PROCEDURE — 84443 ASSAY THYROID STIM HORMONE: CPT | Performed by: FAMILY MEDICINE

## 2024-06-03 PROCEDURE — 99213 OFFICE O/P EST LOW 20 MIN: CPT | Mod: 25 | Performed by: FAMILY MEDICINE

## 2024-06-03 PROCEDURE — G0439 PPPS, SUBSEQ VISIT: HCPCS | Performed by: FAMILY MEDICINE

## 2024-06-03 SDOH — HEALTH STABILITY: PHYSICAL HEALTH: ON AVERAGE, HOW MANY DAYS PER WEEK DO YOU ENGAGE IN MODERATE TO STRENUOUS EXERCISE (LIKE A BRISK WALK)?: 7 DAYS

## 2024-06-03 ASSESSMENT — PAIN SCALES - GENERAL: PAINLEVEL: NO PAIN (0)

## 2024-06-03 ASSESSMENT — SOCIAL DETERMINANTS OF HEALTH (SDOH): HOW OFTEN DO YOU GET TOGETHER WITH FRIENDS OR RELATIVES?: ONCE A WEEK

## 2024-06-03 NOTE — LETTER
"June 25, 2024      Jhoana Benjamin David  [address] [place] [address]        Dear ,    We are writing to inform you of your test results.    Your LDL (\"bad cholesterol\") was above goal.  Genetics, diet, weight and low exercise levels can contribute to this.  Your HDL (\"good cholesterol\") was at goal (>45 in men or > 55 in women).  Your triglyceride level was normal. Elevated LDL cholesterol and triglycerides, as well as low HDL cholesterol, increase a person's risk for heart and vascular disease.     In your case, your 10-year risk of having a heart attack or other cardiovascular event is about 9.7% (see data listed below), according to the Pooled Cohort Equations (AHA/ACC 2013 Blood Cholesterol Guideline). Since this risk value is greater than 7.5%, it qualifies you to start taking a cholesterol-lowering medication. If you are interested in doing that or if you want to discuss rechecking your lipid levels in a few months, we could discuss it during a virtual visit.     The following can all contribute to healthier cholesterol levels: maintaining a healthy diet with lean proteins, whole grains, and healthy fats such as olive oil, canola oil, and fish oil; regular exercise; maintaining an appropriate weight.     Your glucose (blood sugar) was in the normal range.   Maintaining a healthy weight is benificial to good blood sugar control.     Your TSH is mildly above the reference range, similar to in 2019 and 2021, consistent with subclinical hypothyroidism. We do not need to do anything else for now besides continuing to monitor.     Resulted Orders   Lipid panel reflex to direct LDL Non-fasting   Result Value Ref Range    Cholesterol 231 (H) <200 mg/dL    Triglycerides 110 <150 mg/dL    Direct Measure HDL 76 >=50 mg/dL    LDL Cholesterol Calculated 133 (H) <=100 mg/dL    Non HDL Cholesterol 155 (H) <130 mg/dL    Patient Fasting > 8hrs? Yes     Narrative    Cholesterol  Desirable:  <200 " mg/dL    Triglycerides  Normal:  Less than 150 mg/dL  Borderline High:  150-199 mg/dL  High:  200-499 mg/dL  Very High:  Greater than or equal to 500 mg/dL    Direct Measure HDL  Female:  Greater than or equal to 50 mg/dL   Male:  Greater than or equal to 40 mg/dL    LDL Cholesterol  Desirable:  <100mg/dL  Above Desirable:  100-129 mg/dL   Borderline High:  130-159 mg/dL   High:  160-189 mg/dL   Very High:  >= 190 mg/dL    Non HDL Cholesterol  Desirable:  130 mg/dL  Above Desirable:  130-159 mg/dL  Borderline High:  160-189 mg/dL  High:  190-219 mg/dL  Very High:  Greater than or equal to 220 mg/dL   Glucose   Result Value Ref Range    Glucose 99 70 - 99 mg/dL    Patient Fasting > 8hrs? Yes    TSH   Result Value Ref Range    TSH 5.01 (H) 0.30 - 4.20 uIU/mL       If you have any questions or concerns, please call the clinic at the number listed above.       Sincerely,      Nelson Camp MD

## 2024-06-03 NOTE — PROGRESS NOTES
Preventive Care Visit  [facility]  Nelson Camp MD, Family Medicine  Shola 3, 2024      Assessment & Plan     (Z00.00) Encounter for Medicare annual wellness exam  (primary encounter diagnosis)  Comment: Negative screening exam; up-to-date on preventive services.   Plan: COVID-19 12+ (2023-24) (PFIZER), Lipid panel         reflex to direct LDL Non-fasting, Glucose        Return in about 1 year (around 6/3/2025) for Medicare annual wellness exam.      (E03.8) Subclinical hypothyroidism  Comment: Strong family history of thyroid disease. Her TSH is usually normal.   Plan: TSH        follow up as needed, otherwise monitor yearly.     (L82.1) Seborrheic keratosis  Comment: She reports having multiple lesions removed recently ([facility]) that were probably seborrheic keratoses. This one does not bother her yet.   Plan: Handout(s) provided.     (M21.611) Bunion, right  (L84) Callus of foot  Comment: Discussed strategies for avoiding further callus formation. Since they maybe related to foot shape change from her bunion, she may want to see the podiatrist.   Plan: Orthopedic  Referral            (Z12.11) Screen for colon cancer  Comment:   Plan: Colonoscopy Screening  Referral, Adult        GI  Referral - Consult Only            (Z12.31) Visit for screening mammogram  Comment:   Plan: MA SCREENING DIGITAL BILAT - Future  (s+30)            (Z13.220) Screening for lipid disorders  Comment:   Plan: Lipid panel reflex to direct LDL Non-fasting            (Z13.1) Screening for diabetes mellitus  Comment:   Plan: Glucose            (Z23) Need for vaccination  Comment:   Plan: COVID-19 12+ (2023-24) (PFIZER)                Counseling  Appropriate preventive services were discussed with this patient, including applicable screening as appropriate for fall prevention, nutrition, physical activity, Tobacco-use cessation, weight loss and cognition.  Checklist reviewing preventive  services available has been given to the patient.  Reviewed patient's diet, addressing concerns and/or questions.         Subjective   Jhoana is a 69 year old, presenting for the following:  Physical        6/3/2024     8:35 AM   Additional Questions   Roomed by Ella         Health Care Directive  Patient does not have a Health Care Directive or Living Will: Patient states has Advance Directive and will bring in a copy to clinic.    HPI        6/3/2024   General Health   How would you rate your overall physical health? Good   Feel stress (tense, anxious, or unable to sleep) Not at all         6/3/2024   Nutrition   Diet: Regular (no restrictions)         6/3/2024   Exercise   Days per week of moderate/strenous exercise 7 days         6/3/2024   Social Factors   Frequency of gathering with friends or relatives Once a week   Worry food won't last until get money to buy more No   Food not last or not have enough money for food? No   Do you have housing?  Yes   Are you worried about losing your housing? No   Lack of transportation? No   Unable to get utilities (heat,electricity)? No         6/3/2024   Fall Risk   Fallen 2 or more times in the past year? No    No   Trouble with walking or balance? No    No          6/3/2024   Activities of Daily Living- Home Safety   Needs help with the following daily activites None of the above   Safety concerns in the home None of the above         6/3/2024   Dental   Dentist two times every year? Yes         6/3/2024   Hearing Screening   Hearing concerns? None of the above         6/3/2024   Driving Risk Screening   Patient/family members have concerns about driving No         6/3/2024   General Alertness/Fatigue Screening   Have you been more tired than usual lately? No         6/3/2024   Urinary Incontinence Screening   Bothered by leaking urine in past 6 months No         6/3/2024   TB Screening   Were you born outside of the [place]? No         Today's PHQ-2 Score:       6/3/2024      8:34 AM   PHQ-2 ( 1999 Pfizer)   Q1: Little interest or pleasure in doing things 0   Q2: Feeling down, depressed or hopeless 0   PHQ-2 Score 0   Q1: Little interest or pleasure in doing things Not at all   Q2: Feeling down, depressed or hopeless Not at all   PHQ-2 Score 0           6/3/2024   Substance Use   Alcohol more than 3/day or more than 7/wk No   Do you have a current opioid prescription? No   How severe/bad is pain from 1 to 10? 0/10 (No Pain)   Do you use any other substances recreationally? No     Social History     Tobacco Use    Smoking status: Never     Passive exposure: Never    Smokeless tobacco: Never   Vaping Use    Vaping status: Never Used   Substance Use Topics    Alcohol use: Yes     Comment: occassionally    Drug use: No           6/20/2023   LAST FHS-7 RESULTS   1st degree relative breast or ovarian cancer Yes   Any relative bilateral breast cancer No   Any male have breast cancer No   Any ONE woman have BOTH breast AND ovarian cancer No   Any woman with breast cancer before 50yrs Unknown   2 or more relatives with breast AND/OR ovarian cancer No   2 or more relatives with breast AND/OR bowel cancer No            History of abnormal Pap smear: No - age 65 or older with adequate negative prior screening test results (3 consecutive negative cytology results, 2 consecutive negative cotesting results, or 2 consecutive negative HrHPV test results within 10 years, with the most recent test occurring within the recommended screening interval for the test used)        10/31/2011     8:03 AM   PAP / HPV   PAP (Historical) NIL      ASCVD Risk   The 10-year ASCVD risk score (Cecilia PRYOR, et al., 2019) is: 9.3%    Values used to calculate the score:      Age: 69 years      Sex: Female      Is Non- : No      Diabetic: No      Tobacco smoker: No      Systolic Blood Pressure: 131 mmHg      Is BP treated: No      HDL Cholesterol: 79 mg/dL      Total Cholesterol: 206  "mg/dL            Past medical, family, and social histories, medications, and allergies are reviewed and updated in Epic.       Current providers sharing in care for this patient include:  Patient Care Team:  Nelson Camp MD as PCP - General (Family Practice)  Nelson Camp MD as Assigned PCP  Adrian Lagunas MD as MD (Otolaryngology)  Del Cordova MD as MD (Gastroenterology)  Del Cordova MD as Assigned Gastroenterology Provider  Adrian Lagunas MD as Assigned Surgical Provider    The following health maintenance items are reviewed in Epic and correct as of today:  Health Maintenance   Topic Date Due    RSV VACCINE (Pregnancy & 60+) (1 - 1-dose 60+ series) Never done    COLORECTAL CANCER SCREENING  07/26/2022    COVID-19 Vaccine (7 - 2023-24 season) 09/01/2023    ANNUAL REVIEW OF HM ORDERS  05/22/2024    MEDICARE ANNUAL WELLNESS VISIT  05/22/2024    MAMMO SCREENING  06/20/2024    INFLUENZA VACCINE (Season Ended) 09/01/2024    FALL RISK ASSESSMENT  06/03/2025    GLUCOSE  05/22/2026    LIPID  05/22/2028    ADVANCE CARE PLANNING  05/22/2028    DTAP/TDAP/TD IMMUNIZATION (2 - Td or Tdap) 05/25/2028    DEXA  04/16/2036    HEPATITIS C SCREENING  Completed    PHQ-2 (once per calendar year)  Completed    Pneumococcal Vaccine: 65+ Years  Completed    ZOSTER IMMUNIZATION  Completed    IPV IMMUNIZATION  Aged Out    HPV IMMUNIZATION  Aged Out    MENINGITIS IMMUNIZATION  Aged Out    RSV MONOCLONAL ANTIBODY  Aged Out       Review of Systems     Patient reports she has been walking a lot more recently and has noticed calluses on her feet. She also notes bunion of right foot.        Objective    Exam  /79 (BP Location: Left arm, Patient Position: Sitting, Cuff Size: Adult Regular)   Pulse 70   Temp 97.1  F (36.2  C) (Oral)   Ht 1.623 m (5' 3.9\")   Wt 51.6 kg (113 lb 12.8 oz)   LMP  (LMP Unknown)   SpO2 99%   BMI 19.60 kg/m     Estimated body mass index is 19.6 kg/m  " "as calculated from the following:    Height as of this encounter: 1.623 m (5' 3.9\").    Weight as of this encounter: 51.6 kg (113 lb 12.8 oz).    Physical Exam  GENERAL: alert and no distress  EYES: Eyes grossly normal to inspection, PERRL and conjunctivae and sclerae normal  HENT: ear canals and TM's normal, nose and mouth without ulcers or lesions  NECK: no adenopathy, no asymmetry, masses, or scars  RESP: lungs clear to auscultation - no rales, rhonchi or wheezes  BREAST: normal without masses, tenderness or nipple discharge and no palpable axillary masses or adenopathy  CV: regular rate and rhythm, normal S1 S2, no S3 or S4, no murmur, click or rub, no peripheral edema  ABDOMEN: soft, nontender, no hepatosplenomegaly, no masses and bowel sounds normal  MS: no gross musculoskeletal defects noted, no edema  SKIN: no suspicious lesions or rashes. There is a small seborrheic keratosis on the right side of the back laterally.  NEURO: Normal strength and tone, mentation intact and speech normal  PSYCH: mentation appears normal, affect normal/bright        6/3/2024   Mini Cog   Clock Draw Score 2 Normal   3 Item Recall 3 objects recalled   Mini Cog Total Score 5              Signed Electronically by: Nelson Camp MD      The information in this document, created by the medical scribe for me, accurately reflects the services I personally performed and the decisions made by me. I have reviewed and approved this document for accuracy prior to signature.  Ada Taveras"

## 2024-06-03 NOTE — PATIENT INSTRUCTIONS
"Preventive Care Advice   This is general advice we often give to help people stay healthy. Your care team may have specific advice just for you. Please talk to your care team about your own preventive care needs.  Lifestyle  Exercise at least 150 minutes each week (30 minutes a day, 5 days a week).  Do muscle strengthening activities 2 days a week. These help control your weight and prevent disease.  No smoking.  Wear sunscreen to prevent skin cancer.  Have your home tested for radon every 2 to 5 years. Radon is a colorless, odorless gas that can harm your lungs. To learn more, go to www.health.[facility].mn. and search for \"Radon in Homes.\"  Keep guns unloaded and locked up in a safe place like a safe or gun vault, or, use a gun lock and hide the keys. Always lock away bullets separately. To learn more, visit [employer].gov and search for \"safe gun storage.\"  Nutrition  Eat 5 or more servings of fruits and vegetables each day.  Try wheat bread, brown rice and whole grain pasta (instead of white bread, rice, and pasta).  Get enough calcium and vitamin D. Check the label on foods and aim for 100% of the RDA (recommended daily allowance).  Regular exams  Have a dental exam and cleaning every 6 months.  See your health care team every year to talk about:  Any changes in your health.  Any medicines your care team has prescribed.  Preventive care, family planning, and ways to prevent chronic diseases.  Shots (vaccines)   HPV shots (up to age 26), if you've never had them before.  Hepatitis B shots (up to age 59), if you've never had them before.  COVID-19 shot: Get this shot when it's due.  Flu shot: Get a flu shot every year.  Tetanus shot: Get a tetanus shot every 10 years.  Pneumococcal, hepatitis A, and RSV shots: Ask your care team if you need these based on your risk.  Shingles shot (for age 50 and up).  General health tests  Diabetes screening:  Starting at age 35, Get screened for diabetes at least every 3 years.  If " you are younger than age 35, ask your care team if you should be screened for diabetes.  Cholesterol test: At age 39, start having a cholesterol test every 5 years, or more often if advised.  Bone density scan (DEXA): At age 50, ask your care team if you should have this scan for osteoporosis (brittle bones).  Hepatitis C: Get tested at least once in your life.  Abdominal aortic aneurysm screening: Talk to your doctor about having this screening if you:  Have ever smoked; and  Are biologically male; and  Are between the ages of 65 and 75.  STIs (sexually transmitted infections)  Before age 24: Ask your care team if you should be screened for STIs.  After age 24: Get screened for STIs if you're at risk. You are at risk for STIs (including HIV) if:  You are sexually active with more than one person.  You don't use condoms every time.  You or a partner was diagnosed with a sexually transmitted infection.  If you are at risk for HIV, ask about PrEP medicine to prevent HIV.  Get tested for HIV at least once in your life, whether you are at risk for HIV or not.  Cancer screening tests  Cervical cancer screening: If you have a cervix, begin getting regular cervical cancer screening tests at age 21. Most people who have regular screenings with normal results can stop after age 65. Talk about this with your provider.  Breast cancer scan (mammogram): If you've ever had breasts, begin having regular mammograms starting at age 40. This is a scan to check for breast cancer.  Colon cancer screening: It is important to start screening for colon cancer at age 45.  Have a colonoscopy test every 10 years (or more often if you're at risk) Or, ask your provider about stool tests like a FIT test every year or Cologuard test every 3 years.  To learn more about your testing options, visit: www.[employer].com/509268.pdf.  For help making a decision, visit: maddie/jl42766.  Prostate cancer screening test: If you have a prostate and are age 55  to 69, ask your provider if you would benefit from a yearly prostate cancer screening test.  Lung cancer screening: If you are a current or former smoker age 50 to 80, ask your care team if ongoing lung cancer screenings are right for you.  For informational purposes only. Not to replace the advice of your health care provider. Copyright   2023 [employer]. All rights reserved. Clinically reviewed by the [facility] Transitions Program. [employer] 501023 - REV 04/24.

## 2024-06-12 ENCOUNTER — OFFICE VISIT (OUTPATIENT)
Dept: PODIATRY | Facility: CLINIC | Age: 69
End: 2024-06-12
Attending: FAMILY MEDICINE
Payer: MEDICARE

## 2024-06-12 ENCOUNTER — ANCILLARY PROCEDURE (OUTPATIENT)
Dept: GENERAL RADIOLOGY | Facility: CLINIC | Age: 69
End: 2024-06-12
Attending: PODIATRIST
Payer: MEDICARE

## 2024-06-12 VITALS — HEART RATE: 74 BPM | OXYGEN SATURATION: 100 %

## 2024-06-12 DIAGNOSIS — L84 CALLUS OF FOOT: ICD-10-CM

## 2024-06-12 DIAGNOSIS — B35.9 TINEA: Primary | ICD-10-CM

## 2024-06-12 DIAGNOSIS — M21.611 BUNION, RIGHT: ICD-10-CM

## 2024-06-12 PROCEDURE — 99203 OFFICE O/P NEW LOW 30 MIN: CPT | Performed by: PODIATRIST

## 2024-06-12 PROCEDURE — 73630 X-RAY EXAM OF FOOT: CPT | Mod: RT | Performed by: RADIOLOGY

## 2024-06-12 RX ORDER — TERBINAFINE HYDROCHLORIDE 250 MG/1
250 TABLET ORAL DAILY
Qty: 30 TABLET | Refills: 0 | Status: SHIPPED | OUTPATIENT
Start: 2024-06-12

## 2024-06-12 NOTE — LETTER
6/12/2024      Jhoana Hernandez  [address] [place] [address]      Dear Colleague,    Thank you for referring your patient, Jhoana Hernandez, to the [facility]. Please see a copy of my visit note below.    Patient seen today in consult from Dr. Camp complains of a rash on right foot.  Started this winter.  Was wearing winter boots.  Skin becoming quite red and dry.  Denies fissures or drainage.  She has been putting a lot of lotion on and has helped somewhat but still dry.  Has some dryness on the left foot but not as bad as the right.  Also has bunion on her right foot has had this for years.  Not really painful.  She is wondering about strategies to prevent this from getting worse.  Getting close to second toe.  Also has small calluses on foot.  She does have some allergies to mineral oil and aloe so has to be careful what she puts on her skin.    ROS:  see above         Allergies   Allergen Reactions     Docosahexaenoic Acid-Epa Anaphylaxis     Fish Oil Anaphylaxis     Nuts Anaphylaxis     Palmarosa Oil Shortness Of Breath     Shellfish Allergy Anaphylaxis     Aloe Hives     Aloe Vera      Canola Oil [Vegetable Oil] Other (See Comments)     Sneezing     Coconut (Cocos Nucifera) Other (See Comments)     Fish      Fluticasone      Food Hives     Hydrogenated Palm Oil Glycerides Other (See Comments)     Ibuprofen Sodium      Macrodantin [Nitrofuran Derivatives]      Mineral Oil Itching     Peanut (Diagnostic)      Seafood      Sulfa Antibiotics      Yellow Dye      Yellow Dyes (Non-Tartrazine) Itching     Ibuprofen Other (See Comments)     Jittery, Chest pain, SOB, Congestion, Dizziness       Current Outpatient Medications   Medication Sig Dispense Refill     terbinafine (LAMISIL) 250 MG tablet Take 1 tablet (250 mg) by mouth daily 30 tablet 0     EPINEPHrine (ANY BX GENERIC EQUIV) 0.3 MG/0.3ML injection 2-pack Inject 0.3 mLs (0.3 mg) into the muscle as needed  for anaphylaxis (or exposure to trigger) 2 each 0     Loratadine (CLARITIN PO) Take 1 tablet by mouth as needed       mometasone (NASONEX) 50 MCG/ACT nasal spray Spray 2 sprays into both nostrils daily for allergy prevention. (Patient not taking: Reported on 2023) 17 g 11     prednisoLONE acetate (PRED FORTE) 1 % ophthalmic suspension 4 drops into both ears twice as needed (Patient not taking: Reported on 2023) 10 mL 0       Patient Active Problem List   Diagnosis     CARDIOVASCULAR SCREENING; LDL GOAL LESS THAN 160     Lactose intolerance     Seasonal allergic rhinitis     Allergy, food     Advanced directives, counseling/discussion     Subclinical hypothyroidism     Osteopenia     Hollenhorst plaque, left eye     Family history of breast cancer     Chronic eczematous otitis externa of both ears       Past Medical History:   Diagnosis Date     Benign head tremor      Gastritis      IBS (irritable bowel syndrome)      Infertility, female 1989    had IVF     Lactose intolerance      Osteopenia 2011     Ovarian cyst 2011    bilateral serous cystadenomas     Recurrent UTI      Seasonal allergic rhinitis        Past Surgical History:   Procedure Laterality Date      SECTION  ,      COLONOSCOPY  2012    Procedure: COLONOSCOPY;  COLONOSCOPY, ABDOMINAL PAIN;  Surgeon: Maco Mascorro MD;  Location: [facility] OR     HC EXCISION BREAST LESION, OPEN >=1 Left 2004    LT Benign     HYSTERECTOMY, PAP NO LONGER INDICATED  2011    JOSAFAT with BSO- benign bilateral serous cystadenomas     LAPAROSCOPY  1986    ov cystectomy     ZZC TOTAL ABDOM HYSTERECTOMY  2011    with BSO       Family History   Problem Relation Age of Onset     Asthma Sister      Thyroid Disease Sister      Lipids Mother      Food Allergy Mother      Thyroid Disease Mother      Cerebrovascular Disease Father      Asthma Father      Thyroid Disease Brother      Diabetes Type 2  Sister       Breast Cancer Sister 60     Thyroid Disease Sister      Graves' disease Sister      Thyroid Disease Sister      Other - See Comments Son      Other - See Comments Son      C.A.D. No family hx of      Hypertension No family hx of      Cancer - colorectal No family hx of      Prostate Cancer No family hx of        Social History     Tobacco Use     Smoking status: Never     Passive exposure: Never     Smokeless tobacco: Never   Substance Use Topics     Alcohol use: Yes     Comment: occassionally         Exam:    Vitals: Pulse 74   LMP  (LMP Unknown)   SpO2 100%   BMI: There is no height or weight on file to calculate BMI.  Height: Data Unavailable    Constitutional/ general:  Pt is in no apparent distress, appears well-nourished.  Cooperative with history and physical exam.     Psych:  The patient answered questions appropriately.  Normal affect.  Seems to have reasonable expectations, in terms of treatment.     Lungs:  Non labored breathing, non labored speech. No cough.  No audible wheezing. Even, quiet breathing.       Vascular:  positive pedal pulses bilaterally for both the DP and PT arteries.  CFT < 3 sec.  negative ankle edema.  positive pedal hair growth.    Neuro:  Alert and oriented x 3. Coordinated gait.  Light touch sensation is intact     Musculoskeletal:    Lower extremity muscle strength is normal.  Patient is ambulatory without an assistive device or brace.   Normal arch with weightbearing.  Right bunion deformity noted.  Range of motion normal and this is not track bound.  No pain.  MS 5/5 all compartments.  Normal ROM all fore foot and rearfoot joints.  No equinus.     Derm: Normal texture and turgor.  No erythema, ecchymosis, or cyanosis.  Skin dry and erythematous both feet moccasin distribution with right being worse than the left.  No fissures or breakdown.  Small calluses on forefoot.       A/P    Chronic tinea pedis  Right bunion  Calluses    Discussed cause of chronic tinea.  Explained how  this makes feet dry.  Discussed treating with topical versus oral.  There is some question to topical allergies.  Will write prescription for oral Lamisil to be taken for 1 month.  Discussed strategies to keep feet dry in the future.  She will rotate shoes.  Will let feet get sunlight in the summer.  X-rays taken of right foot.  Discussed cause of bunion.  No pain so she will continue to watch this.  Dispensed toe .  RTC as needed.  Thank you for allowing me participate in the care of this patient.        Miguel Carmona DPM, FACFAS      Again, thank you for allowing me to participate in the care of your patient.        Sincerely,        Miguel Carmona DPM

## 2024-06-12 NOTE — PATIENT INSTRUCTIONS
We wish you continued good healing. If you have any questions or concerns, please do not hesitate to contact us at  [phone]    [employer] (secure e-mail communication and access to your chart) to send a message or to make an appointment.    Please remember to call and schedule a follow up appointment if one was recommended at your earliest convenience.     PODIATRY CLINIC HOURS  TELEPHONE NUMBER    Dr. Miguel KAURPMILENA FACFAS        Clinics:  [facility]   [place]  Tuesday 1PM-6PM  [place]  Wednesday 745AM-330PM  [place]  Monday 2nd,4th  830AM-4PM  Thursday/Friday 745AM-230PM     [facility] APPOINTMENTS  [phone]    [place] APPOINTMENTS  [phone]          If you need a medication refill, please contact us you may need lab work and/or a follow up visit prior to your refill (i.e. Antifungal medications).  If MRI needed please call Imaging at [phone]   HOW DO I GET MY KNEE SCOOTER? Knee scooters can be picked up at ANY Medical Supply stores with your knee scooter Prescription.  OR  Bring your signed prescription to an [facility] showroom.   Set up an appointment for your custom Orthotics. Call any Orthotics locations call [phone]

## 2024-06-12 NOTE — LETTER
June 12, 2024      Jhoana Hernandez  [address] [address]        To Whom It May Concern:    Jhoana Hernandez  was seen on 06/12/24. Due to her chronic fungal infections  It is best that she rotate her shoes often. Have her feet exposed to sunlight daily. Until gone. Use Lamisil cream daily.           Sincerely,        Dr. Miguel Carmona D.P.M FAC FAS/lld    (Electronically Signed)

## 2024-06-12 NOTE — PROGRESS NOTES
Patient seen today in consult from Dr. Camp complains of a rash on right foot.  Started this winter.  Was wearing winter boots.  Skin becoming quite red and dry.  Denies fissures or drainage.  She has been putting a lot of lotion on and has helped somewhat but still dry.  Has some dryness on the left foot but not as bad as the right.  Also has bunion on her right foot has had this for years.  Not really painful.  She is wondering about strategies to prevent this from getting worse.  Getting close to second toe.  Also has small calluses on foot.  She does have some allergies to mineral oil and aloe so has to be careful what she puts on her skin.    ROS:  see above         Allergies   Allergen Reactions    Docosahexaenoic Acid-Epa Anaphylaxis    Fish Oil Anaphylaxis    Nuts Anaphylaxis    Palmarosa Oil Shortness Of Breath    Shellfish Allergy Anaphylaxis    Aloe Hives    Aloe Vera     Canola Oil [Vegetable Oil] Other (See Comments)     Sneezing    Coconut (Cocos Nucifera) Other (See Comments)    Fish     Fluticasone     Food Hives    Hydrogenated Palm Oil Glycerides Other (See Comments)    Ibuprofen Sodium     Macrodantin [Nitrofuran Derivatives]     Mineral Oil Itching    Peanut (Diagnostic)     Seafood     Sulfa Antibiotics     Yellow Dye     Yellow Dyes (Non-Tartrazine) Itching    Ibuprofen Other (See Comments)     Jittery, Chest pain, SOB, Congestion, Dizziness       Current Outpatient Medications   Medication Sig Dispense Refill    terbinafine (LAMISIL) 250 MG tablet Take 1 tablet (250 mg) by mouth daily 30 tablet 0    EPINEPHrine (ANY BX GENERIC EQUIV) 0.3 MG/0.3ML injection 2-pack Inject 0.3 mLs (0.3 mg) into the muscle as needed for anaphylaxis (or exposure to trigger) 2 each 0    Loratadine (CLARITIN PO) Take 1 tablet by mouth as needed      mometasone (NASONEX) 50 MCG/ACT nasal spray Spray 2 sprays into both nostrils daily for allergy prevention. (Patient not taking: Reported on 8/26/2023) 17 g 11     prednisoLONE acetate (PRED FORTE) 1 % ophthalmic suspension 4 drops into both ears twice as needed (Patient not taking: Reported on 2023) 10 mL 0       Patient Active Problem List   Diagnosis    CARDIOVASCULAR SCREENING; LDL GOAL LESS THAN 160    Lactose intolerance    Seasonal allergic rhinitis    Allergy, food    Advanced directives, counseling/discussion    Subclinical hypothyroidism    Osteopenia    Hollenhorst plaque, left eye    Family history of breast cancer    Chronic eczematous otitis externa of both ears       Past Medical History:   Diagnosis Date    Benign head tremor     Gastritis     IBS (irritable bowel syndrome)     Infertility, female 1989    had IVF    Lactose intolerance     Osteopenia 2011    Ovarian cyst 2011    bilateral serous cystadenomas    Recurrent UTI     Seasonal allergic rhinitis        Past Surgical History:   Procedure Laterality Date     SECTION  1991    COLONOSCOPY  2012    Procedure: COLONOSCOPY;  COLONOSCOPY, ABDOMINAL PAIN;  Surgeon: Maco Mascorro MD;  Location: [facility] OR    HC EXCISION BREAST LESION, OPEN >=1 Left 2004    LT Benign    HYSTERECTOMY, PAP NO LONGER INDICATED  2011    JOSAFAT with BSO- benign bilateral serous cystadenomas    LAPAROSCOPY  1986    ov cystectomy    ZZC TOTAL ABDOM HYSTERECTOMY  2011    with BSO       Family History   Problem Relation Age of Onset    Asthma Sister     Thyroid Disease Sister     Lipids Mother     Food Allergy Mother     Thyroid Disease Mother     Cerebrovascular Disease Father     Asthma Father     Thyroid Disease Brother     Diabetes Type 2  Sister     Breast Cancer Sister 60    Thyroid Disease Sister     Graves' disease Sister     Thyroid Disease Sister     Other - See Comments Son     Other - See Comments Son     C.A.D. No family hx of     Hypertension No family hx of     Cancer - colorectal No family hx of     Prostate Cancer No family hx of        Social History      Tobacco Use    Smoking status: Never     Passive exposure: Never    Smokeless tobacco: Never   Substance Use Topics    Alcohol use: Yes     Comment: occassionally         Exam:    Vitals: Pulse 74   LMP  (LMP Unknown)   SpO2 100%   BMI: There is no height or weight on file to calculate BMI.  Height: Data Unavailable    Constitutional/ general:  Pt is in no apparent distress, appears well-nourished.  Cooperative with history and physical exam.     Psych:  The patient answered questions appropriately.  Normal affect.  Seems to have reasonable expectations, in terms of treatment.     Lungs:  Non labored breathing, non labored speech. No cough.  No audible wheezing. Even, quiet breathing.       Vascular:  positive pedal pulses bilaterally for both the DP and PT arteries.  CFT < 3 sec.  negative ankle edema.  positive pedal hair growth.    Neuro:  Alert and oriented x 3. Coordinated gait.  Light touch sensation is intact     Musculoskeletal:    Lower extremity muscle strength is normal.  Patient is ambulatory without an assistive device or brace.   Normal arch with weightbearing.  Right bunion deformity noted.  Range of motion normal and this is not track bound.  No pain.  MS 5/5 all compartments.  Normal ROM all fore foot and rearfoot joints.  No equinus.     Derm: Normal texture and turgor.  No erythema, ecchymosis, or cyanosis.  Skin dry and erythematous both feet moccasin distribution with right being worse than the left.  No fissures or breakdown.  Small calluses on forefoot.       A/P    Chronic tinea pedis  Right bunion  Calluses    Discussed cause of chronic tinea.  Explained how this makes feet dry.  Discussed treating with topical versus oral.  There is some question to topical allergies.  Will write prescription for oral Lamisil to be taken for 1 month.  Discussed strategies to keep feet dry in the future.  She will rotate shoes.  Will let feet get sunlight in the summer.  X-rays taken of right foot.   Discussed cause of bunion.  No pain so she will continue to watch this.  Dispensed toe .  RTC as needed.  Thank you for allowing me participate in the care of this patient.        Miguel Carmona DPM, FACFAS

## 2024-06-14 ENCOUNTER — TELEPHONE (OUTPATIENT)
Dept: FAMILY MEDICINE | Facility: CLINIC | Age: 69
End: 2024-06-14
Payer: MEDICARE

## 2024-06-24 ENCOUNTER — TELEPHONE (OUTPATIENT)
Dept: FAMILY MEDICINE | Facility: CLINIC | Age: 69
End: 2024-06-24

## 2024-06-24 ENCOUNTER — ANCILLARY PROCEDURE (OUTPATIENT)
Dept: MAMMOGRAPHY | Facility: CLINIC | Age: 69
End: 2024-06-24
Attending: FAMILY MEDICINE
Payer: MEDICARE

## 2024-06-24 DIAGNOSIS — Z12.31 VISIT FOR SCREENING MAMMOGRAM: ICD-10-CM

## 2024-06-24 PROCEDURE — 77063 BREAST TOMOSYNTHESIS BI: CPT | Mod: GC | Performed by: RADIOLOGY

## 2024-06-24 PROCEDURE — 77067 SCR MAMMO BI INCL CAD: CPT | Mod: GC | Performed by: RADIOLOGY

## 2024-06-24 NOTE — TELEPHONE ENCOUNTER
Patient calling regarding results from 06/03/2024. No comment from provider. Routing to nurses to rely results. Patient declined being transferred would like a call back around 12:00.    Ella Salazar

## 2024-06-27 ENCOUNTER — DOCUMENTATION ONLY (OUTPATIENT)
Dept: OTHER | Facility: CLINIC | Age: 69
End: 2024-06-27
Payer: MEDICARE

## 2024-07-15 ENCOUNTER — TELEPHONE (OUTPATIENT)
Dept: GASTROENTEROLOGY | Facility: CLINIC | Age: 69
End: 2024-07-15
Payer: MEDICARE

## 2024-07-15 ENCOUNTER — HOSPITAL ENCOUNTER (OUTPATIENT)
Facility: AMBULATORY SURGERY CENTER | Age: 69
End: 2024-07-15
Attending: INTERNAL MEDICINE
Payer: MEDICARE

## 2024-07-15 NOTE — TELEPHONE ENCOUNTER
"Endoscopy Scheduling Screen    Have you had a positive Covid test in the last 14 days?  No    What is your communication preference for Instructions and/or Bowel Prep?   Mail/USPS    What insurance is in the chart?  Other:  BCBS/Medicare    Ordering/Referring Provider: Tasha   (If ordering provider performs procedure, schedule with ordering provider unless otherwise instructed. )    BMI: Estimated body mass index is 19.6 kg/m  as calculated from the following:    Height as of 6/3/24: 1.623 m (5' 3.9\").    Weight as of 6/3/24: 51.6 kg (113 lb 12.8 oz).     Sedation Ordered  MAC/deep sedation.   BMI<= 45 45 < BMI <= 48 48 < BMI < = 50  BMI > 50   No Restrictions No [facility]  No [facility]  [facility] with exceptions Hospital Only OR Only       Do you have a history of malignant hyperthermia?  No    (Females) Are you currently pregnant?   No     Have you been diagnosed or told you have pulmonary hypertension?   No    Do you have an LVAD?  No    Have you been told you have moderate to severe sleep apnea?  No    Have you been told you have COPD, asthma, or any other lung disease?  No    Do you have any heart conditions?  No     Have you ever had or are you waiting for an organ transplant?  No. Continue scheduling, no site restrictions.    Have you had a stroke or transient ischemic attack (TIA aka \"mini stroke\" in the last 6 months?   No    Have you been diagnosed with or been told you have cirrhosis of the liver?   No    Are you currently on dialysis?   No    Do you need assistance transferring?   No    BMI: Estimated body mass index is 19.6 kg/m  as calculated from the following:    Height as of 6/3/24: 1.623 m (5' 3.9\").    Weight as of 6/3/24: 51.6 kg (113 lb 12.8 oz).     Is patients BMI > 40 and scheduling location [facility]?  No    Do you take an injectable medication for weight loss or diabetes (excluding insulin)?  No    Do you take the medication Naltrexone?  No    Do you take blood thinners?  No       Prep   Are you " currently on dialysis or do you have chronic kidney disease?  No    Do you have a diagnosis of diabetes?  No    Do you have a diagnosis of cystic fibrosis (CF)?  No    On a regular basis do you go 3 -5 days between bowel movements?  No    BMI > 40?  No    Preferred Pharmacy:    [id number] - [place], [place] - [address]  [address] [address]  Phone: [phone] Fax: [phone]      Final Scheduling Details     Procedure scheduled  Colonoscopy / Upper endoscopy (EGD)    Surgeon:  Tasha     Date of procedure:  11.20.24     Pre-OP / PAC:   No - Not required for this site.    Location  [facility] - [facility] - Patient preference.    Sedation   MAC/Deep Sedation - Per order.      Patient Reminders:   You will receive a call from a Nurse to review instructions and health history.  This assessment must be completed prior to your procedure.  Failure to complete the Nurse assessment may result in the procedure being cancelled.      On the day of your procedure, please designate an adult(s) who can drive you home stay with you for the next 24 hours. The medicines used in the exam will make you sleepy. You will not be able to drive.      You cannot take public transportation, ride share services, or non-medical taxi service without a responsible caregiver.  Medical transport services are allowed with the requirement that a responsible caregiver will receive you at your destination.  We require that drivers and caregivers are confirmed prior to your procedure.

## 2024-07-25 NOTE — PROGRESS NOTES
"History of Present Illness - Jhoana Hernandez is a 67 year old female last seen on 1/9/2023, but has also seen Dr Lerma a few times since then.    To review, for many years she has noted a sense of fullness and itching in the ears. She has tried various OTC drops before, but nothing seems to help.  She has never had ear surgery or chronic ear disease.  However, she notes that there seems to be a routine event every fall and spring where her ears seem more stuffy and full.  On exam, after debriding out the canals, her exam strongly suggested chronic eczematous otitis externa.  I would have liked to have used dermotic, but she has a severe anaphylactic nut allergy and therefore I had her use more home treatments like olive oil.  She tells me that it has seemed to help.  Her ears will get stuffy at times.    Also, she had complaints of some dull aching and fullness around the ears that would come and go.  She wears invisalign braces, and felt that this was temporomandibular disease.  I gave her a home therapy sheet.  And at the 9/30/13 visit, this problem had settled down just fine.  She had returned 4/12/16 due to a recurrence of ear pain.  This started about a month prior and she was diagnosed with otitis media and sinusitis.  Antibiotics were given, and things improved.    With regards to her temporomandibular disease, she does tell me that she notes that when she skips wearing her invisalign brace overnight the ear and facial aches do get better.    She has returned primarily to have her ears checked because of fullness since she ran out of prednisolone drops for the ears, more in the LEFT ear.    The main reason for the May 2022 follow up was her nose and throat feel congested, possibly due to her seasonal allergies, which are again worse this year.  But she also notes that about 2 weeks ago she acutely had pressure and \"burning\" in the head, and assumed it was sinusitis.  She was seen in urgent care, and was " given a Z Pack which helped a bit.  She went back to urgent care and was treated with Augmentin and that gave much more relief.      She takes as needed claritin, Nasonex, and even benadryl when things are really bad. But stopped the medications while this recent episode was happening.  She is still having post nasal drainage, congestion.    She mentions that soon after seeing me in May 2022, her nasal issues cleared up quickly without medication.  She also mentions that she had an episode of eating thin mint girl  cookies and almost had anaphylaxis.  She figured out that canola and palm oil do it to her.  Since totally eliminating those things she is having much less symptoms.    She is back today for ear check and cerumen removal, but also wanted to talk about her sinuses.  At the last visit in 2023 we did discuss some sinus issues.  But she notes that she understands that her sense of post nasal drainage and reflux could be related.  She has the GERD under control with dietary changes.    Past Medical History -   Patient Active Problem List   Diagnosis    CARDIOVASCULAR SCREENING; LDL GOAL LESS THAN 160    Lactose intolerance    Seasonal allergic rhinitis    Allergy, food    Subclinical hypothyroidism    Osteopenia    Hollenhorst plaque, left eye    Family history of breast cancer    Chronic eczematous otitis externa of both ears       Current Medications -   Current Outpatient Medications:     EPINEPHrine (ANY BX GENERIC EQUIV) 0.3 MG/0.3ML injection 2-pack, Inject 0.3 mLs (0.3 mg) into the muscle as needed for anaphylaxis (or exposure to trigger), Disp: 2 each, Rfl: 0    Loratadine (CLARITIN PO), Take 1 tablet by mouth as needed, Disp: , Rfl:     mometasone (NASONEX) 50 MCG/ACT nasal spray, Spray 2 sprays into both nostrils daily for allergy prevention. (Patient not taking: Reported on 8/26/2023), Disp: 17 g, Rfl: 11    prednisoLONE acetate (PRED FORTE) 1 % ophthalmic suspension, 4 drops into both ears  twice as needed (Patient not taking: Reported on 9/13/2023), Disp: 10 mL, Rfl: 0    terbinafine (LAMISIL) 250 MG tablet, Take 1 tablet (250 mg) by mouth daily, Disp: 30 tablet, Rfl: 0    Allergies -   Allergies   Allergen Reactions    Docosahexaenoic Acid-Epa Anaphylaxis    Fish Oil Anaphylaxis    Nuts Anaphylaxis    Palmarosa Oil Shortness Of Breath    Shellfish Allergy Anaphylaxis    Aloe Hives    Aloe Vera     Canola Oil [Vegetable Oil] Other (See Comments)     Sneezing    Coconut (Cocos Nucifera) Other (See Comments)    Fish     Fluticasone     Food Hives    Hydrogenated Palm Oil Glycerides Other (See Comments)    Ibuprofen Sodium     Macrodantin [Nitrofuran Derivatives]     Mineral Oil Itching    Peanut (Diagnostic)     Seafood     Sulfa Antibiotics     Yellow Dye     Yellow Dyes (Non-Tartrazine) Itching    Ibuprofen Other (See Comments)     Jittery, Chest pain, SOB, Congestion, Dizziness       Social History -   History     Social History    Marital Status:      Spouse Name: Bruno     Number of Children: 2    Years of Education: N/A     Occupational History     shop      [employer]      Social History Main Topics    Smoking status: Never Smoker     Smokeless tobacco: Never Used    Alcohol Use: Yes      Comment: occassionally    Drug Use: No    Sexual Activity:     Partners: Male     Other Topics Concern    Parent/Sibling W/ Cabg, Mi Or Angioplasty Before 65f 55m? No     Social History Narrative       Family History -   Family History   Problem Relation Age of Onset    Asthma Sister     Thyroid Disease Sister     Lipids Mother     Food Allergy Mother     Thyroid Disease Mother     Cerebrovascular Disease Father     Asthma Father     Thyroid Disease Brother     Diabetes Type 2  Sister     Breast Cancer Sister 60    Thyroid Disease Sister     Graves' disease Sister     Thyroid Disease Sister     Other - See Comments Son     Other - See Comments Son     C.A.D. No family hx of      Hypertension No family hx of     Cancer - colorectal No family hx of     Prostate Cancer No family hx of        Review of Systems - As per HPI and PMHx, otherwise 7 system review of the head and neck negative.    Physical Exam  /80   Pulse 95   Wt 51.7 kg (114 lb)   LMP  (LMP Unknown)   BMI 19.63 kg/m      General - The patient is well nourished and well developed, and appears to have good nutritional status.  Alert and oriented to person and place, answers questions and cooperates with examination appropriately.   Head and Face - Normocephalic and atraumatic, with no gross asymmetry noted of the contour of the facial features.  The facial nerve is intact, with strong symmetric movements.  Voice and Breathing - The patient was breathing comfortably without the use of accessory muscles. There was no wheezing, stridor, or stertor.  The patients voice was clear and strong, and had appropriate pitch and quality.  Eyes - Extraocular movements intact, and the pupils were reactive to light.  Sclera were not icteric or injected, conjunctiva were pink and moist.  Mouth - Examination of the oral cavity showed pink, healthy oral mucosa. No lesions or ulcerations noted.  The tongue was mobile and midline, and the dentition were in good condition.      Cerumen Removal    Physical Exam and Procedure  Ears - On examination of the ears, I found that the  sides had cerumen impaction.  Therefore, I positioned them in the examination chair in a semi-supine position, beginning with the right side.  I used the binocular surgical microscope to perform cerumen removal.  I began by using a cerumen loop to gently lift the edges of the cerumen mass away from the walls of the external canal.  Once I did this, I was able to suction away fragments of wax and debris using suction.  Once the mass was loose enough, the entire plug was pulled from the canal.  The tympanic membrane was intact, no sign of perforation or middle ear  effusion.    I turned my attention to the contralateral side once again using the binocular surgical microscope to perform cerumen removal.  I began by using a cerumen loop to gently lift the edges of the cerumen mass away from the walls of the external canal.  Once I did this, I was able to suction away fragments of wax and debris using suction.  Once the mass was loose enough, the entire plug was pulled from the canal.  The tympanic membrane was intact, no sign of perforation or middle ear effusion.        A/P - Jhoana Hernandez is a 67 year old female  (J01.41) Acute recurrent pansinusitis  (primary encounter diagnosis)  (J30.1) Seasonal allergic rhinitis due to pollen  (H61.23) Bilateral impacted cerumen    The patient has had their cerumen procedurally removed today.  I have discussed ear care at home, including avoiding qtips or any other object placed in the canal.  I have also discussed that over the counter cerumen kits like Debrox or Cerumenex can be useful.    With regards to her reflux medication, no need if she is under control with just diet.    And finally continue her current allergic rhinitis management on an as needed basis

## 2024-08-01 ENCOUNTER — OFFICE VISIT (OUTPATIENT)
Dept: OTOLARYNGOLOGY | Facility: CLINIC | Age: 69
End: 2024-08-01
Payer: MEDICARE

## 2024-08-01 VITALS
HEART RATE: 95 BPM | DIASTOLIC BLOOD PRESSURE: 80 MMHG | WEIGHT: 114 LBS | SYSTOLIC BLOOD PRESSURE: 127 MMHG | BODY MASS INDEX: 19.63 KG/M2

## 2024-08-01 DIAGNOSIS — H90.3 SENSORINEURAL HEARING LOSS (SNHL) OF BOTH EARS: ICD-10-CM

## 2024-08-01 DIAGNOSIS — H61.23 BILATERAL IMPACTED CERUMEN: Primary | ICD-10-CM

## 2024-08-01 DIAGNOSIS — H60.8X3 CHRONIC ECZEMATOUS OTITIS EXTERNA OF BOTH EARS: ICD-10-CM

## 2024-08-01 PROCEDURE — 69210 REMOVE IMPACTED EAR WAX UNI: CPT | Performed by: OTOLARYNGOLOGY

## 2024-08-01 PROCEDURE — 99213 OFFICE O/P EST LOW 20 MIN: CPT | Mod: 25 | Performed by: OTOLARYNGOLOGY

## 2024-08-01 RX ORDER — FLUOCINOLONE ACETONIDE 0.11 MG/ML
5 OIL AURICULAR (OTIC) 2 TIMES DAILY
Qty: 20 ML | Refills: 2 | Status: SHIPPED | OUTPATIENT
Start: 2024-08-01 | End: 2024-08-08

## 2024-08-01 NOTE — LETTER
8/1/2024      Jhoana Hernandez  [address] [place] [address]      Dear Colleague,    Thank you for referring your patient, Jhoana Hernandez, to the [facility]. Please see a copy of my visit note below.    History of Present Illness - Jhoana Hernandez is a 67 year old female last seen on 1/9/2023, but has also seen Dr Lerma a few times since then.    To review, for many years she has noted a sense of fullness and itching in the ears. She has tried various OTC drops before, but nothing seems to help.  She has never had ear surgery or chronic ear disease.  However, she notes that there seems to be a routine event every fall and spring where her ears seem more stuffy and full.  On exam, after debriding out the canals, her exam strongly suggested chronic eczematous otitis externa.  I would have liked to have used dermotic, but she has a severe anaphylactic nut allergy and therefore I had her use more home treatments like olive oil.  She tells me that it has seemed to help.  Her ears will get stuffy at times.    Also, she had complaints of some dull aching and fullness around the ears that would come and go.  She wears invisalign braces, and felt that this was temporomandibular disease.  I gave her a home therapy sheet.  And at the 9/30/13 visit, this problem had settled down just fine.  She had returned 4/12/16 due to a recurrence of ear pain.  This started about a month prior and she was diagnosed with otitis media and sinusitis.  Antibiotics were given, and things improved.    With regards to her temporomandibular disease, she does tell me that she notes that when she skips wearing her invisalign brace overnight the ear and facial aches do get better.    She has returned primarily to have her ears checked because of fullness since she ran out of prednisolone drops for the ears, more in the LEFT ear.    The main reason for the May 2022 follow up was her nose and throat  "feel congested, possibly due to her seasonal allergies, which are again worse this year.  But she also notes that about 2 weeks ago she acutely had pressure and \"burning\" in the head, and assumed it was sinusitis.  She was seen in urgent care, and was given a Z Pack which helped a bit.  She went back to urgent care and was treated with Augmentin and that gave much more relief.      She takes as needed claritin, Nasonex, and even benadryl when things are really bad. But stopped the medications while this recent episode was happening.  She is still having post nasal drainage, congestion.    She mentions that soon after seeing me in May 2022, her nasal issues cleared up quickly without medication.  She also mentions that she had an episode of eating thin mint girl  cookies and almost had anaphylaxis.  She figured out that canola and palm oil do it to her.  Since totally eliminating those things she is having much less symptoms.    She is back today for ear check and cerumen removal, but also wanted to talk about her sinuses.  At the last visit in 2023 we did discuss some sinus issues.  But she notes that she understands that her sense of post nasal drainage and reflux could be related.  She has the GERD under control with dietary changes.    Past Medical History -   Patient Active Problem List   Diagnosis     CARDIOVASCULAR SCREENING; LDL GOAL LESS THAN 160     Lactose intolerance     Seasonal allergic rhinitis     Allergy, food     Subclinical hypothyroidism     Osteopenia     Hollenhorst plaque, left eye     Family history of breast cancer     Chronic eczematous otitis externa of both ears       Current Medications -   Current Outpatient Medications:      EPINEPHrine (ANY BX GENERIC EQUIV) 0.3 MG/0.3ML injection 2-pack, Inject 0.3 mLs (0.3 mg) into the muscle as needed for anaphylaxis (or exposure to trigger), Disp: 2 each, Rfl: 0     Loratadine (CLARITIN PO), Take 1 tablet by mouth as needed, Disp: , Rfl:      " mometasone (NASONEX) 50 MCG/ACT nasal spray, Spray 2 sprays into both nostrils daily for allergy prevention. (Patient not taking: Reported on 8/26/2023), Disp: 17 g, Rfl: 11     prednisoLONE acetate (PRED FORTE) 1 % ophthalmic suspension, 4 drops into both ears twice as needed (Patient not taking: Reported on 9/13/2023), Disp: 10 mL, Rfl: 0     terbinafine (LAMISIL) 250 MG tablet, Take 1 tablet (250 mg) by mouth daily, Disp: 30 tablet, Rfl: 0    Allergies -   Allergies   Allergen Reactions     Docosahexaenoic Acid-Epa Anaphylaxis     Fish Oil Anaphylaxis     Nuts Anaphylaxis     Palmarosa Oil Shortness Of Breath     Shellfish Allergy Anaphylaxis     Aloe Hives     Aloe Vera      Canola Oil [Vegetable Oil] Other (See Comments)     Sneezing     Coconut (Cocos Nucifera) Other (See Comments)     Fish      Fluticasone      Food Hives     Hydrogenated Palm Oil Glycerides Other (See Comments)     Ibuprofen Sodium      Macrodantin [Nitrofuran Derivatives]      Mineral Oil Itching     Peanut (Diagnostic)      Seafood      Sulfa Antibiotics      Yellow Dye      Yellow Dyes (Non-Tartrazine) Itching     Ibuprofen Other (See Comments)     Jittery, Chest pain, SOB, Congestion, Dizziness       Social History -   History     Social History     Marital Status:      Spouse Name: Bruno     Number of Children: 2     Years of Education: N/A     Occupational History      shop       [employer]      Social History Main Topics     Smoking status: Never Smoker      Smokeless tobacco: Never Used     Alcohol Use: Yes      Comment: occassionally     Drug Use: No     Sexual Activity:     Partners: Male     Other Topics Concern     Parent/Sibling W/ Cabg, Mi Or Angioplasty Before 65f 55m? No     Social History Narrative       Family History -   Family History   Problem Relation Age of Onset     Asthma Sister      Thyroid Disease Sister      Lipids Mother      Food Allergy Mother      Thyroid Disease Mother       Cerebrovascular Disease Father      Asthma Father      Thyroid Disease Brother      Diabetes Type 2  Sister      Breast Cancer Sister 60     Thyroid Disease Sister      Graves' disease Sister      Thyroid Disease Sister      Other - See Comments Son      Other - See Comments Son      C.A.D. No family hx of      Hypertension No family hx of      Cancer - colorectal No family hx of      Prostate Cancer No family hx of        Review of Systems - As per HPI and PMHx, otherwise 7 system review of the head and neck negative.    Physical Exam  /80   Pulse 95   Wt 51.7 kg (114 lb)   LMP  (LMP Unknown)   BMI 19.63 kg/m      General - The patient is well nourished and well developed, and appears to have good nutritional status.  Alert and oriented to person and place, answers questions and cooperates with examination appropriately.   Head and Face - Normocephalic and atraumatic, with no gross asymmetry noted of the contour of the facial features.  The facial nerve is intact, with strong symmetric movements.  Voice and Breathing - The patient was breathing comfortably without the use of accessory muscles. There was no wheezing, stridor, or stertor.  The patients voice was clear and strong, and had appropriate pitch and quality.  Eyes - Extraocular movements intact, and the pupils were reactive to light.  Sclera were not icteric or injected, conjunctiva were pink and moist.  Mouth - Examination of the oral cavity showed pink, healthy oral mucosa. No lesions or ulcerations noted.  The tongue was mobile and midline, and the dentition were in good condition.      Cerumen Removal    Physical Exam and Procedure  Ears - On examination of the ears, I found that the  sides had cerumen impaction.  Therefore, I positioned them in the examination chair in a semi-supine position, beginning with the right side.  I used the binocular surgical microscope to perform cerumen removal.  I began by using a cerumen loop to gently lift the  edges of the cerumen mass away from the walls of the external canal.  Once I did this, I was able to suction away fragments of wax and debris using suction.  Once the mass was loose enough, the entire plug was pulled from the canal.  The tympanic membrane was intact, no sign of perforation or middle ear effusion.    I turned my attention to the contralateral side once again using the binocular surgical microscope to perform cerumen removal.  I began by using a cerumen loop to gently lift the edges of the cerumen mass away from the walls of the external canal.  Once I did this, I was able to suction away fragments of wax and debris using suction.  Once the mass was loose enough, the entire plug was pulled from the canal.  The tympanic membrane was intact, no sign of perforation or middle ear effusion.        A/P Samir Hernandez is a 67 year old female  (J01.41) Acute recurrent pansinusitis  (primary encounter diagnosis)  (J30.1) Seasonal allergic rhinitis due to pollen  (H61.23) Bilateral impacted cerumen    The patient has had their cerumen procedurally removed today.  I have discussed ear care at home, including avoiding qtips or any other object placed in the canal.  I have also discussed that over the counter cerumen kits like Debrox or Cerumenex can be useful.    With regards to her reflux medication, no need if she is under control with just diet.    And finally continue her current allergic rhinitis management on an as needed basis        Again, thank you for allowing me to participate in the care of your patient.        Sincerely,        Hussein Pepper MD

## 2024-08-26 ENCOUNTER — TELEPHONE (OUTPATIENT)
Dept: FAMILY MEDICINE | Facility: CLINIC | Age: 69
End: 2024-08-26
Payer: MEDICARE

## 2024-08-26 DIAGNOSIS — U07.1 INFECTION DUE TO 2019 NOVEL CORONAVIRUS: Primary | ICD-10-CM

## 2024-08-26 NOTE — TELEPHONE ENCOUNTER
RN COVID TREATMENT VISIT  08/26/24      The patient has been triaged and does not require a higher level of care.    Jhoana Hernandez  69 year old  Current weight? 114    Has the patient been seen by a primary care provider at an [facility] or [facility] within the past two years? Yes.   Have you been in close proximity to/do you have a known exposure to a person with a confirmed case of influenza? No.     General treatment eligibility:  Date of positive COVID test (PCR or at home)?  8.26.2024    Are you or have you been hospitalized for this COVID-19 infection? No.   Have you received monoclonal antibodies or antiviral treatment for COVID-19 since this positive test? No.   Do you have any of the following conditions that place you at risk of being very sick from COVID-19?   - Age 50 years or older  Yes, patient has at least one high risk condition as noted above.     Current COVID symptoms:   - fever or chills  - sore throat  Yes. Patient has at least one symptom as selected.     How many days since symptoms started? 5 days or less. Established patient, 12 years or older weighing at least 88.2 lbs, who has symptoms that started in the past 5 days, has not been hospitalized nor received treatment already, and is at risk for being very sick from COVID-19.     Treatment eligibility by RN:  Are you currently pregnant or nursing? No  Do you have a clinically significant hypersensitivity to nirmatrelvir or ritonavir, or toxic epidermal necrolysis (TEN) or Long-Derrick Syndrome? No  Do you have a history of hepatitis, any hepatic impairment on the Problem List (such as Child-Woodard Class C, cirrhosis, fatty liver disease, alcoholic liver disease), or was the last liver lab (hepatic panel, ALT, AST, ALK Phos, bilirubin) elevated in the past 6 months? No  Do you have any history of severe renal impairment (eGFR < 30mL/min)? No    Is patient eligible to continue? Yes, patient meets all  eligibility requirements for the RN COVID treatment (as denoted by all no responses above).     Current Outpatient Medications   Medication Sig Dispense Refill    EPINEPHrine (ANY BX GENERIC EQUIV) 0.3 MG/0.3ML injection 2-pack Inject 0.3 mLs (0.3 mg) into the muscle as needed for anaphylaxis (or exposure to trigger) 2 each 0    Loratadine (CLARITIN PO) Take 1 tablet by mouth as needed      mometasone (NASONEX) 50 MCG/ACT nasal spray Spray 2 sprays into both nostrils daily for allergy prevention. (Patient not taking: Reported on 8/26/2023) 17 g 11    prednisoLONE acetate (PRED FORTE) 1 % ophthalmic suspension 4 drops into both ears twice as needed (Patient not taking: Reported on 9/13/2023) 10 mL 0    terbinafine (LAMISIL) 250 MG tablet Take 1 tablet (250 mg) by mouth daily 30 tablet 0       Medications from List 1 of the standing order (on medications that exclude the use of Paxlovid) that patient is taking: NONE. Is patient taking Mehreen's Wort? No  Is patient taking Mehreen's Wort or any meds from List 1? No.   Medications from List 2 of the standing order (on meds that provider needs to adjust) that patient is taking: NONE. Is patient on any of the meds from List 2? No.   Medications from List 3 of standing order (on meds that a RN needs to adjust) that patient is taking: NONE. Is patient on any meds from List 3? No.     Paxlovid has an approximate 90% reduction in hospitalization. Paxlovid can possibly cause altered sense of taste, diarrhea (loose, watery stools), high blood pressure, muscle aches.     Would patient like a Paxlovid prescription?   Yes.   Lab Results   Component Value Date    GFRESTIMATED 65 05/22/2023       Was last eGFR reduced? No, eGFR 60 or greater/ No Result on record. Patient can receive the normal renal function dose. Paxlovid Rx sent to [place] [facility]   [facility] [phone]    [address] [id number]  [place] [address]    Hours:  Mon-Fri: 9:00a -  5:00p    [facility] Pickup available    Temporary change to home medications: None    All medication adjustments (holds, etc) were discussed with the patient and patient was asked to repeat back (teachback) their med adjustment.  Did patient understand med adjustment? Yes, patient repeated back and understood correctly.        Reviewed the following instructions with the patient:    Paxlovid (nimatrelvir and ritonavir)    How it works  Two medicines (nirmatrelvir and ritonavir) are taken together. They stop the virus from growing. Less amount of virus is easier for your body to fight.    How to take  Medicine comes in a daily container with both medicine tablets. Take by mouth twice daily (once in the morning, once at night) for 5 days.  The number of tablets to take varies by patient.  Don't chew or break capsules. Swallow whole.    When to take  Take as soon as possible after positive COVID-19 test result, and within 5 days of your first symptoms.    Possible side effects  Can cause altered sense of taste, diarrhea (loose, watery stools), high blood pressure, muscle aches.    Chandni Hernandez RN

## 2024-10-10 ENCOUNTER — TELEPHONE (OUTPATIENT)
Dept: GASTROENTEROLOGY | Facility: CLINIC | Age: 69
End: 2024-10-10
Payer: MEDICARE

## 2024-10-10 NOTE — TELEPHONE ENCOUNTER
Caller: Jhoana    Reason for Reschedule/Cancellation   (please be detailed, any staff messages or encounters to note?): Patient is not sure she needs, wants to talk with MD first      Prior to reschedule please review:  Ordering Provider: Tasha   Sedation Determined: MAC  Does patient have any ASC Exclusions, please identify?: N      Notes on Cancelled Procedure:  Procedure: Upper and Lower Endoscopy [EGD and Colonoscopy]   Date: 11/20/24  Location: [facility]; [address] [place] [address], [address] Floor, [place] [address]   Surgeon: Tasha      Rescheduled: No, Patient is not sure she needs, wants to talk with MD first        Did you cancel or rescheduled an EUS procedure? No.

## 2024-12-19 ENCOUNTER — OFFICE VISIT (OUTPATIENT)
Dept: FAMILY MEDICINE | Facility: CLINIC | Age: 69
End: 2024-12-19
Payer: MEDICARE

## 2024-12-19 VITALS
HEIGHT: 64 IN | RESPIRATION RATE: 16 BRPM | TEMPERATURE: 97.3 F | HEART RATE: 76 BPM | OXYGEN SATURATION: 98 % | SYSTOLIC BLOOD PRESSURE: 152 MMHG | DIASTOLIC BLOOD PRESSURE: 84 MMHG | WEIGHT: 116.2 LBS | BODY MASS INDEX: 19.84 KG/M2

## 2024-12-19 DIAGNOSIS — H60.8X3 CHRONIC ECZEMATOUS OTITIS EXTERNA OF BOTH EARS: ICD-10-CM

## 2024-12-19 DIAGNOSIS — H61.22 IMPACTED CERUMEN OF LEFT EAR: Primary | ICD-10-CM

## 2024-12-19 ASSESSMENT — PAIN SCALES - GENERAL: PAINLEVEL_OUTOF10: MODERATE PAIN (5)

## 2024-12-19 NOTE — PROGRESS NOTES
"  Assessment & Plan     Impacted cerumen of left ear  Removed with irrigation.  - UT REMOVAL IMPACTED CERUMEN IRRIGATION/LVG UNILAT    Chronic eczematous otitis externa of both ears  Continue follow-up with ENT and as needed prednisolone      Blood pressure elevated today however normal at last visit, okay to monitor.          Subjective   Jhoana is a 69 year old, presenting for the following health issues:  Ear Problem (Left ear)        12/19/2024     9:41 AM   Additional Questions   Roomed by Woody     History of Present Illness       Reason for visit:  Left ear pain  Symptom onset:  1-2 weeks ago  Symptom intensity:  Moderate  Symptom progression:  Staying the same   She is taking medications regularly.     Patient follows up with left ear plugging, intermittent diminished hearing.  No pain or drainage.  Has had issues with wax buildup in the past and this feels similar.  She does follow with ENT but upcoming appointment not for another month.  Not noticed any right sided symptoms.  Does have chronic eczema in her ear canals for which she uses as needed prednisone, seems to be more of a seasonal issue and does not tend to bother her as much in the wintertime, she last used this about a week ago.                Objective    BP (!) 152/84 (BP Location: Left arm, Patient Position: Sitting, Cuff Size: Adult Regular)   Pulse 76   Temp 97.3  F (36.3  C) (Temporal)   Resp 16   Ht 1.623 m (5' 3.9\")   Wt 52.7 kg (116 lb 3.2 oz)   LMP  (LMP Unknown)   SpO2 98%   BMI 20.01 kg/m    Body mass index is 20.01 kg/m .  Physical Exam   GENERAL: alert and no distress  HENT: normal cephalic/atraumatic, right ear: Minimal cerumen, TM unremarkable, and left ear: Cerumen impaction, after irrigation TM unremarkable            Signed Electronically by: Brandee Morgan PA-C    "

## 2025-01-15 NOTE — PROGRESS NOTES
"History of Present Illness - Jhoana Hernandez is a 69 year old female last seen on 8/1/2024, but has also seen Dr Lerma a few times since then.    To review, for many years she has noted a sense of fullness and itching in the ears. She has tried various OTC drops before, but nothing seems to help.  She has never had ear surgery or chronic ear disease.  However, she notes that there seems to be a routine event every fall and spring where her ears seem more stuffy and full.  On exam, after debriding out the canals, her exam strongly suggested chronic eczematous otitis externa.  I would have liked to have used dermotic, but she has a severe anaphylactic nut allergy and therefore I had her use more home treatments like olive oil.  She tells me that it has seemed to help.  Her ears will get stuffy at times.    Also, she had complaints of some dull aching and fullness around the ears that would come and go.  She wears invisalign braces, and felt that this was temporomandibular disease.  I gave her a home therapy sheet.  And at the 9/30/13 visit, this problem had settled down just fine.  She had returned 4/12/16 due to a recurrence of ear pain.  This started about a month prior and she was diagnosed with otitis media and sinusitis.  Antibiotics were given, and things improved.    With regards to her temporomandibular disease, she does tell me that she notes that when she skips wearing her invisalign brace overnight the ear and facial aches do get better.    She has returned primarily to have her ears checked because of fullness since she ran out of prednisolone drops for the ears, more in the LEFT ear.    The main reason for the May 2022 follow up was her nose and throat feel congested, possibly due to her seasonal allergies, which are again worse this year.  But she also notes that about 2 weeks ago she acutely had pressure and \"burning\" in the head, and assumed it was sinusitis.  She was seen in urgent care, and was " given a Z Pack which helped a bit.  She went back to urgent care and was treated with Augmentin and that gave much more relief.      She takes as needed claritin, Nasonex, and even benadryl when things are really bad. But stopped the medications while this recent episode was happening.  She is still having post nasal drainage, congestion.    She mentions that soon after seeing me in May 2022, her nasal issues cleared up quickly without medication.  She also mentions that she had an episode of eating thin mint girl  cookies and almost had anaphylaxis.  She figured out that canola and palm oil do it to her.  Since totally eliminating those things she is having much less symptoms.    She is back today for ear check and cerumen removal, but also wanted to talk about her sinuses.  At the last visit in 2023 we did discuss some sinus issues.  But she notes that she understands that her sense of post nasal drainage and reflux could be related.  She has the GERD under control with dietary changes.    Past Medical History -   Patient Active Problem List   Diagnosis    CARDIOVASCULAR SCREENING; LDL GOAL LESS THAN 160    Lactose intolerance    Seasonal allergic rhinitis    Allergy, food    Subclinical hypothyroidism    Osteopenia    Hollenhorst plaque, left eye    Family history of breast cancer    Chronic eczematous otitis externa of both ears       Current Medications -   Current Outpatient Medications:     EPINEPHrine (ANY BX GENERIC EQUIV) 0.3 MG/0.3ML injection 2-pack, Inject 0.3 mLs (0.3 mg) into the muscle as needed for anaphylaxis (or exposure to trigger), Disp: 2 each, Rfl: 0    Loratadine (CLARITIN PO), Take 1 tablet by mouth as needed, Disp: , Rfl:     mometasone (NASONEX) 50 MCG/ACT nasal spray, Spray 2 sprays into both nostrils daily for allergy prevention. (Patient not taking: Reported on 12/19/2024), Disp: 17 g, Rfl: 11    prednisoLONE acetate (PRED FORTE) 1 % ophthalmic suspension, 4 drops into both ears  twice as needed (Patient not taking: Reported on 12/19/2024), Disp: 10 mL, Rfl: 0    terbinafine (LAMISIL) 250 MG tablet, Take 1 tablet (250 mg) by mouth daily, Disp: 30 tablet, Rfl: 0    Allergies -   Allergies   Allergen Reactions    Docosahexaenoic Acid-Epa Anaphylaxis    Fish Oil Anaphylaxis    Nuts Anaphylaxis    Palmarosa Oil Shortness Of Breath    Shellfish Allergy Anaphylaxis    Aloe Hives    Aloe Vera     Canola Oil [Vegetable Oil] Other (See Comments)     Sneezing    Coconut (Cocos Nucifera) Other (See Comments)    Fish     Fluticasone     Food Hives    Hydrogenated Palm Oil Glycerides Other (See Comments)    Ibuprofen Sodium     Macrodantin [Nitrofuran Derivatives]     Mineral Oil Itching    Peanut (Diagnostic)     Seafood     Sulfa Antibiotics     Yellow Dye     Yellow Dyes (Non-Tartrazine) Itching    Ibuprofen Other (See Comments)     Jittery, Chest pain, SOB, Congestion, Dizziness       Social History -   History     Social History    Marital Status:      Spouse Name: Bruno     Number of Children: 2    Years of Education: N/A     Occupational History     shop      [employer]      Social History Main Topics    Smoking status: Never Smoker     Smokeless tobacco: Never Used    Alcohol Use: Yes      Comment: occassionally    Drug Use: No    Sexual Activity:     Partners: Male     Other Topics Concern    Parent/Sibling W/ Cabg, Mi Or Angioplasty Before 65f 55m? No     Social History Narrative       Family History -   Family History   Problem Relation Age of Onset    Asthma Sister     Thyroid Disease Sister     Lipids Mother     Food Allergy Mother     Thyroid Disease Mother     Cerebrovascular Disease Father     Asthma Father     Thyroid Disease Brother     Diabetes Type 2  Sister     Breast Cancer Sister 60    Thyroid Disease Sister     Graves' disease Sister     Thyroid Disease Sister     Other - See Comments Son     Other - See Comments Son     C.A.D. No family hx of      Hypertension No family hx of     Cancer - colorectal No family hx of     Prostate Cancer No family hx of        Review of Systems - As per HPI and PMHx, otherwise 7 system review of the head and neck negative.    Physical Exam  /79 (BP Location: Left arm, Patient Position: Sitting, Cuff Size: Adult Regular)   Pulse 74   LMP  (LMP Unknown)   SpO2 97%       General - The patient is well nourished and well developed, and appears to have good nutritional status.  Alert and oriented to person and place, answers questions and cooperates with examination appropriately.   Head and Face - Normocephalic and atraumatic, with no gross asymmetry noted of the contour of the facial features.  The facial nerve is intact, with strong symmetric movements.  Voice and Breathing - The patient was breathing comfortably without the use of accessory muscles. There was no wheezing, stridor, or stertor.  The patients voice was clear and strong, and had appropriate pitch and quality.  Eyes - Extraocular movements intact, and the pupils were reactive to light.  Sclera were not icteric or injected, conjunctiva were pink and moist.  Mouth - Examination of the oral cavity showed pink, healthy oral mucosa. No lesions or ulcerations noted.  The tongue was mobile and midline, and the dentition were in good condition.      Cerumen Removal    Physical Exam and Procedure  Ears - On examination of the ears, I found that the  sides had cerumen impaction.  Therefore, I positioned them in the examination chair in a semi-supine position, beginning with the right side.  I used the binocular surgical microscope to perform cerumen removal.  I began by using a cerumen loop to gently lift the edges of the cerumen mass away from the walls of the external canal.  Once I did this, I was able to suction away fragments of wax and debris using suction.  Once the mass was loose enough, the entire plug was pulled from the canal.  The tympanic membrane was  intact, no sign of perforation or middle ear effusion.    I turned my attention to the contralateral side once again using the binocular surgical microscope to perform cerumen removal.  I began by using a cerumen loop to gently lift the edges of the cerumen mass away from the walls of the external canal.  Once I did this, I was able to suction away fragments of wax and debris using suction.  Once the mass was loose enough, the entire plug was pulled from the canal.  The tympanic membrane was intact, no sign of perforation or middle ear effusion.        A/P - Jhoana Hernandez is a 69 year old female  (J01.41) Acute recurrent pansinusitis  (primary encounter diagnosis)  (J30.1) Seasonal allergic rhinitis due to pollen  (H61.23) Bilateral impacted cerumen    The patient has had their cerumen procedurally removed today.  I have discussed ear care at home, including avoiding qtips or any other object placed in the canal.  I have also discussed that over the counter cerumen kits like Debrox or Cerumenex can be useful.    With regards to her reflux medication, no need if she is under control with just diet.    And finally continue her current allergic rhinitis management on an as needed basis

## 2025-01-27 ENCOUNTER — OFFICE VISIT (OUTPATIENT)
Dept: OTOLARYNGOLOGY | Facility: CLINIC | Age: 70
End: 2025-01-27
Payer: MEDICARE

## 2025-01-27 VITALS — OXYGEN SATURATION: 97 % | SYSTOLIC BLOOD PRESSURE: 139 MMHG | DIASTOLIC BLOOD PRESSURE: 79 MMHG | HEART RATE: 74 BPM

## 2025-01-27 DIAGNOSIS — H61.23 BILATERAL IMPACTED CERUMEN: Primary | ICD-10-CM

## 2025-01-27 DIAGNOSIS — H90.3 SENSORINEURAL HEARING LOSS (SNHL) OF BOTH EARS: ICD-10-CM

## 2025-01-27 PROCEDURE — 69210 REMOVE IMPACTED EAR WAX UNI: CPT | Performed by: OTOLARYNGOLOGY

## 2025-01-27 NOTE — LETTER
1/27/2025      Jhoana Hernandez  [address] [place] [address]      Dear Colleague,    Thank you for referring your patient, Jhoana Hernandez, to the [facility]. Please see a copy of my visit note below.    History of Present Illness - Jhoana Hernandez is a 69 year old female last seen on 8/1/2024, but has also seen Dr Lerma a few times since then.    To review, for many years she has noted a sense of fullness and itching in the ears. She has tried various OTC drops before, but nothing seems to help.  She has never had ear surgery or chronic ear disease.  However, she notes that there seems to be a routine event every fall and spring where her ears seem more stuffy and full.  On exam, after debriding out the canals, her exam strongly suggested chronic eczematous otitis externa.  I would have liked to have used dermotic, but she has a severe anaphylactic nut allergy and therefore I had her use more home treatments like olive oil.  She tells me that it has seemed to help.  Her ears will get stuffy at times.    Also, she had complaints of some dull aching and fullness around the ears that would come and go.  She wears invisalign braces, and felt that this was temporomandibular disease.  I gave her a home therapy sheet.  And at the 9/30/13 visit, this problem had settled down just fine.  She had returned 4/12/16 due to a recurrence of ear pain.  This started about a month prior and she was diagnosed with otitis media and sinusitis.  Antibiotics were given, and things improved.    With regards to her temporomandibular disease, she does tell me that she notes that when she skips wearing her invisalign brace overnight the ear and facial aches do get better.    She has returned primarily to have her ears checked because of fullness since she ran out of prednisolone drops for the ears, more in the LEFT ear.    The main reason for the May 2022 follow up was her nose and  "throat feel congested, possibly due to her seasonal allergies, which are again worse this year.  But she also notes that about 2 weeks ago she acutely had pressure and \"burning\" in the head, and assumed it was sinusitis.  She was seen in urgent care, and was given a Z Pack which helped a bit.  She went back to urgent care and was treated with Augmentin and that gave much more relief.      She takes as needed claritin, Nasonex, and even benadryl when things are really bad. But stopped the medications while this recent episode was happening.  She is still having post nasal drainage, congestion.    She mentions that soon after seeing me in May 2022, her nasal issues cleared up quickly without medication.  She also mentions that she had an episode of eating thin mint girl  cookies and almost had anaphylaxis.  She figured out that canola and palm oil do it to her.  Since totally eliminating those things she is having much less symptoms.    She is back today for ear check and cerumen removal, but also wanted to talk about her sinuses.  At the last visit in 2023 we did discuss some sinus issues.  But she notes that she understands that her sense of post nasal drainage and reflux could be related.  She has the GERD under control with dietary changes.    Past Medical History -   Patient Active Problem List   Diagnosis     CARDIOVASCULAR SCREENING; LDL GOAL LESS THAN 160     Lactose intolerance     Seasonal allergic rhinitis     Allergy, food     Subclinical hypothyroidism     Osteopenia     Hollenhorst plaque, left eye     Family history of breast cancer     Chronic eczematous otitis externa of both ears       Current Medications -   Current Outpatient Medications:      EPINEPHrine (ANY BX GENERIC EQUIV) 0.3 MG/0.3ML injection 2-pack, Inject 0.3 mLs (0.3 mg) into the muscle as needed for anaphylaxis (or exposure to trigger), Disp: 2 each, Rfl: 0     Loratadine (CLARITIN PO), Take 1 tablet by mouth as needed, Disp: , " Rfl:      mometasone (NASONEX) 50 MCG/ACT nasal spray, Spray 2 sprays into both nostrils daily for allergy prevention. (Patient not taking: Reported on 12/19/2024), Disp: 17 g, Rfl: 11     prednisoLONE acetate (PRED FORTE) 1 % ophthalmic suspension, 4 drops into both ears twice as needed (Patient not taking: Reported on 12/19/2024), Disp: 10 mL, Rfl: 0     terbinafine (LAMISIL) 250 MG tablet, Take 1 tablet (250 mg) by mouth daily, Disp: 30 tablet, Rfl: 0    Allergies -   Allergies   Allergen Reactions     Docosahexaenoic Acid-Epa Anaphylaxis     Fish Oil Anaphylaxis     Nuts Anaphylaxis     Palmarosa Oil Shortness Of Breath     Shellfish Allergy Anaphylaxis     Aloe Hives     Aloe Vera      Canola Oil [Vegetable Oil] Other (See Comments)     Sneezing     Coconut (Cocos Nucifera) Other (See Comments)     Fish      Fluticasone      Food Hives     Hydrogenated Palm Oil Glycerides Other (See Comments)     Ibuprofen Sodium      Macrodantin [Nitrofuran Derivatives]      Mineral Oil Itching     Peanut (Diagnostic)      Seafood      Sulfa Antibiotics      Yellow Dye      Yellow Dyes (Non-Tartrazine) Itching     Ibuprofen Other (See Comments)     Jittery, Chest pain, SOB, Congestion, Dizziness       Social History -   History     Social History     Marital Status:      Spouse Name: Bruno     Number of Children: 2     Years of Education: N/A     Occupational History      shop       [employer]      Social History Main Topics     Smoking status: Never Smoker      Smokeless tobacco: Never Used     Alcohol Use: Yes      Comment: occassionally     Drug Use: No     Sexual Activity:     Partners: Male     Other Topics Concern     Parent/Sibling W/ Cabg, Mi Or Angioplasty Before 65f 55m? No     Social History Narrative       Family History -   Family History   Problem Relation Age of Onset     Asthma Sister      Thyroid Disease Sister      Lipids Mother      Food Allergy Mother      Thyroid Disease Mother       Cerebrovascular Disease Father      Asthma Father      Thyroid Disease Brother      Diabetes Type 2  Sister      Breast Cancer Sister 60     Thyroid Disease Sister      Graves' disease Sister      Thyroid Disease Sister      Other - See Comments Son      Other - See Comments Son      C.A.D. No family hx of      Hypertension No family hx of      Cancer - colorectal No family hx of      Prostate Cancer No family hx of        Review of Systems - As per HPI and PMHx, otherwise 7 system review of the head and neck negative.    Physical Exam  /79 (BP Location: Left arm, Patient Position: Sitting, Cuff Size: Adult Regular)   Pulse 74   LMP  (LMP Unknown)   SpO2 97%       General - The patient is well nourished and well developed, and appears to have good nutritional status.  Alert and oriented to person and place, answers questions and cooperates with examination appropriately.   Head and Face - Normocephalic and atraumatic, with no gross asymmetry noted of the contour of the facial features.  The facial nerve is intact, with strong symmetric movements.  Voice and Breathing - The patient was breathing comfortably without the use of accessory muscles. There was no wheezing, stridor, or stertor.  The patients voice was clear and strong, and had appropriate pitch and quality.  Eyes - Extraocular movements intact, and the pupils were reactive to light.  Sclera were not icteric or injected, conjunctiva were pink and moist.  Mouth - Examination of the oral cavity showed pink, healthy oral mucosa. No lesions or ulcerations noted.  The tongue was mobile and midline, and the dentition were in good condition.      Cerumen Removal    Physical Exam and Procedure  Ears - On examination of the ears, I found that the  sides had cerumen impaction.  Therefore, I positioned them in the examination chair in a semi-supine position, beginning with the right side.  I used the binocular surgical microscope to perform cerumen  removal.  I began by using a cerumen loop to gently lift the edges of the cerumen mass away from the walls of the external canal.  Once I did this, I was able to suction away fragments of wax and debris using suction.  Once the mass was loose enough, the entire plug was pulled from the canal.  The tympanic membrane was intact, no sign of perforation or middle ear effusion.    I turned my attention to the contralateral side once again using the binocular surgical microscope to perform cerumen removal.  I began by using a cerumen loop to gently lift the edges of the cerumen mass away from the walls of the external canal.  Once I did this, I was able to suction away fragments of wax and debris using suction.  Once the mass was loose enough, the entire plug was pulled from the canal.  The tympanic membrane was intact, no sign of perforation or middle ear effusion.        A/P Samir Hernandez is a 69 year old female  (J01.41) Acute recurrent pansinusitis  (primary encounter diagnosis)  (J30.1) Seasonal allergic rhinitis due to pollen  (H61.23) Bilateral impacted cerumen    The patient has had their cerumen procedurally removed today.  I have discussed ear care at home, including avoiding qtips or any other object placed in the canal.  I have also discussed that over the counter cerumen kits like Debrox or Cerumenex can be useful.    With regards to her reflux medication, no need if she is under control with just diet.    And finally continue her current allergic rhinitis management on an as needed basis        Again, thank you for allowing me to participate in the care of your patient.        Sincerely,        Hussein Pepper MD    Electronically signed

## 2025-04-16 ENCOUNTER — NURSE TRIAGE (OUTPATIENT)
Dept: FAMILY MEDICINE | Facility: CLINIC | Age: 70
End: 2025-04-16
Payer: MEDICARE

## 2025-04-16 ENCOUNTER — OFFICE VISIT (OUTPATIENT)
Dept: URGENT CARE | Facility: URGENT CARE | Age: 70
End: 2025-04-16
Payer: MEDICARE

## 2025-04-16 VITALS
TEMPERATURE: 97.8 F | RESPIRATION RATE: 12 BRPM | DIASTOLIC BLOOD PRESSURE: 84 MMHG | WEIGHT: 121.2 LBS | BODY MASS INDEX: 20.87 KG/M2 | SYSTOLIC BLOOD PRESSURE: 166 MMHG | OXYGEN SATURATION: 98 % | HEART RATE: 71 BPM

## 2025-04-16 DIAGNOSIS — R41.3 MEMORY CHANGES: Primary | ICD-10-CM

## 2025-04-16 DIAGNOSIS — R25.1 SHAKINESS: ICD-10-CM

## 2025-04-16 PROCEDURE — 99214 OFFICE O/P EST MOD 30 MIN: CPT | Performed by: PHYSICIAN ASSISTANT

## 2025-04-16 PROCEDURE — 3077F SYST BP >= 140 MM HG: CPT | Performed by: PHYSICIAN ASSISTANT

## 2025-04-16 PROCEDURE — 3079F DIAST BP 80-89 MM HG: CPT | Performed by: PHYSICIAN ASSISTANT

## 2025-04-16 NOTE — PROGRESS NOTES
Urgent Care Clinic Visit    Chief Complaint   Patient presents with    Urgent Care     Pt c/o being jittery in the mornings and difficulty concentrating for 2 days. Also, slight headache which was relieve by claritin and states she has a history of allergies. Denies chest pain, shortness of breath, unilateral weakness, vision changes, facial drooping.               4/16/2025     1:38 PM   Additional Questions   Roomed by ben york   Accompanied by  Yumiko

## 2025-04-16 NOTE — TELEPHONE ENCOUNTER
Left voicemail attempting to reschedule pt. Writer notified  that appointment for today was unavailable.     Lyudmila Mustafa RN on 4/16/2025 at 1:39 PM

## 2025-04-16 NOTE — TELEPHONE ENCOUNTER
"Pt states she has been jittery in the mornings and had difficulty concentrating for 2 days. She states that she has an essential tremor and a history of diabetes. She notes a slight headache which was relieve by claritin and states she has a history of allergies. She denies chest pain, shortness of breath, unilateral weakness, vision changes, facial drooping. Denies recent exposure to people that are ill. Pt scheduled for an office visit today. Pt will have her  drive her to the clinic. Pt advised to call the clinic if symptoms worsen or change.       Reason for Disposition   Patient wants to be seen    Additional Information   Negative: Difficult to awaken or acting confused (e.g., disoriented, slurred speech)   Negative: Sounds like a life-threatening emergency to the triager   Negative: Sounds like a seizure (generalized or focal)   Negative: Suspected alcohol withdrawal   Negative: Face, arm, or leg weakness   Negative: Suspected substance use (drug use), addiction, or withdrawal   Negative: Shivering or chills and fever   Negative: Muscle rigidity or tightness and present now   Negative: New-onset muscle jerks (twitches, spasms) and present now   Negative: New-onset muscle jerks and episode lasts > 1 minute and resolved   Negative: Patient sounds very sick or weak to the triager   Negative: Muscle rigidity or tightness and brief (now gone)   Negative: New-onset muscle jerks and unexplained and 3 or more times/day    Answer Assessment - Initial Assessment Questions  1. APPEARANCE of MOVEMENT: \"What did the jerking or twitching look like?\" (e.g., body area)      Shakiness in   2. ONSET: \"When did this start happening?\" (e.g., hours, days, weeks, months ago)      yesterday  3. DURATION: \"How long does the jerk, twitch, or spasm last?\"      About a half hour   4. FREQUENCY:  \"How often does this happen?\"       Happened yesterday morning and today   5. WHEN: \"When does this happen?\" (e.g., while awake, while " [INTERVAL HX: ]  Patient seen and examined;  Chart reviewed and events noted;   confused on 1:1 observation      Patient is a 79y Female with a known history of :  Other pulmonary embolism without acute cor pulmonale [I26.99]    No pertinent past medical history [[id number]]    Uncomplicated asthma, unspecified asthma severity [J45.909]    HTN (hypertension) [I10]    No significant past surgical history [[id number]]    S/P cataract surgery [Z98.49]    History of partial hysterectomy [Z90.711]      HPI:  78 yo female PMH dementia, HTN, asthma controlled, seasonal allergies presents to ED c/o SOB. Onset this AM. Hx obtained from daughter Yanelis Fraire as patient has hx of dementia with significant mental decline for past 1-2 months. Daughter states on Monday AM patient went to [facility] stating she was SOB. Neighbor called the ambulance and patient was transferred to [facility] and diagnosed with Covid,+ Rxed  with Regen, was observation  daughter does not know how patient got covid as patient is largely home bound with no visitors. Patient was d/c the same day (4/26) with albuterol inhaler and Losartan as pt was hypertensive. This AM, patient had similar episode where she went to [facility] and c/o SOB, so neighbor called ambulance and patient was transferred to [facility]. Patient seen and examined at bedside, is a poor historian, A&Ox1 (person). Patient states she was SOB this AM but does not know why she is here. C/o chest tightness but denies cough, wheezing, CP, palpitations, fevers, chills, nausea, abd pain. When asked if patient was in hospital on Monday, she replied "I suppose so." Per daughter, patient with significant mental decline since March. Patient received the Pfizer  Covid vaccine  3/26   and since then has had a progressive decline in mental status 2nd dose Vaccine was given 4/16 , Prior to March patient was A&Ox3 and independent, however since then patient has had increased confusion, found wandering to [facility]. Patient does not have a PCP, has seen Dr. Clements once for pre-op clearance for cataract surgery 1 year ago. Daughter states they have been trying to make an appointment with a neurologist for her cognitive decline but have been unable to do so. Of note, patient has a history of hypotension and has never been on anti-hypertensive medications until Monday. Daughter states she was very surprised that her mother was found to be hypertensive and prescribed Losartan. Patient was given one dose yesterday.     In ED:  Vitals: T 98, HR 63, /76, RR 18, SpO2 100% on RA  Labs significant for: WBC 3.77, D-dimer 672, Cl 112, anion gap 2, AST 12, ALT 11  CTA: + acute bilateral pulmonary emboli, no evidence of R heart strain  CT Head: Small vessel ischemic changes in both hemispheres with volume loss and involutional change. No acute infarct or hemorrhagic lesion noted. Right frontal meningioma measuring approximately 1.1 cm.  EKG: NSR, rate 62, T wave abnormality   Given: 1L NS bolus x1, Albuterol inhaler x1, Lovenox 50mg x1  COVID 19-PCR -NEG       (28 Apr 2021 19:49)        MEDICATIONS  (STANDING):  apixaban 10 milliGRAM(s) Oral every 12 hours  loratadine 10 milliGRAM(s) Oral daily  losartan 50 milliGRAM(s) Oral daily  melatonin 3 milliGRAM(s) Oral at bedtime  pantoprazole    Tablet 40 milliGRAM(s) Oral before breakfast  potassium phosphate / sodium phosphate Tablet (K-PHOS No. 2) 1 Tablet(s) Oral four times a day    MEDICATIONS  (PRN):  ALBUTerol    90 MICROgram(s) HFA Inhaler 1 Puff(s) Inhalation every 4 hours PRN Shortness of Breath and/or Wheezing      Vital Signs Last 24 Hrs  T(C): 36.4 (30 Apr 2021 11:47), Max: 36.4 (29 Apr 2021 19:24)  T(F): 97.6 (30 Apr 2021 11:47), Max: 97.6 (29 Apr 2021 19:24)  HR: 65 (30 Apr 2021 11:47) (60 - 73)  BP: 122/86 (30 Apr 2021 11:47) (109/58 - 144/71)  BP(mean): --  RR: 18 (30 Apr 2021 11:47) (18 - 19)  SpO2: 98% (30 Apr 2021 11:47) (95% - 98%)      [PHYSICAL EXAM]  General: adult in NAD,  WN,  WD.  HEENT: clear oropharynx, anicteric sclera, pink conjunctivae.  Neck: supple, no masses.  CV: normal S1S2, no murmur, no rubs, no gallops.  Lungs: clear to auscultation, no wheezes, no rales, no rhonchi.  Abdomen: soft, non-tender, non-distended, no hepatosplenomegaly, normal BS, no guarding.  Ext: no clubbing, no cyanosis, no edema.  Skin: no rashes,  no petechiae, no venous stasis changes.  Neuro: alert and oriented X1  , no focal motor deficits.  LN: no SC TESS.      [LABS:]                        13.0   4.37  )-----------( 218      ( 30 Apr 2021 06:50 )             39.7     04-30    142  |  110<H>  |  13  ----------------------------<  79  4.0   |  28  |  0.67    Ca    9.3      30 Apr 2021 06:50  Phos  2.4     04-30  Mg     2.3     04-30    TPro  6.2  /  Alb  3.4  /  TBili  1.2  /  DBili  x   /  AST  11<L>  /  ALT  10<L>  /  AlkPhos  62  04-29    PT/INR - ( 29 Apr 2021 07:25 )   PT: 13.3 sec;   INR: 1.14 ratio         PTT - ( 29 Apr 2021 07:25 )  PTT:36.8 sec            [RADIOLOGY STUDIES:]   "falling asleep, while sleeping)      In the morning predominately, not waking up from sleep   6. CAUSE: \"What do you think caused the shakiness ?\"      Had gestational diabetes   7. OTHER SYMPTOMS: \"Are there any other symptoms?\" (e.g., fever, headache)      Denies increased thirst, denies increased urination,   Denies shortness of breath, denies chest pain, no vision changes or double vision, reports trouble finding words, denies unilateral weakness in extremities, states she has been dropping objects out of both hands, denies. A&0x3   Overall does not feel right, has a slight headache and chills.   Headache is off and on, takes claritin which was helpful believes related to allergies. To her knowledge has not been exposed to sickness.   Denies dysuria, hemturia, no abdominal pain or back pain.   8. PREGNANCY: \"Is there any chance you are pregnant?\" \"When was your last menstrual period?\"      no    Protocols used: Muscle Jerks - Tics - [id number]    Lyudmila Mustafa RN on 4/16/2025 at 1:02 PM   "

## 2025-04-16 NOTE — PROGRESS NOTES
Chief Complaint   Patient presents with    Urgent Care     Pt c/o being jittery in the mornings and difficulty concentrating for 2 days. Also, slight headache which was relieve by claritin and states she has a history of allergies. Denies chest pain, shortness of breath, unilateral weakness, vision changes, facial drooping.       Assessment & Plan  Assessment  - Unclear etiology of symptoms.  No focal deficits on exam today however I do think her symptoms warrant evaluation at a higher level of care today.  Patient agrees to go to the emergency room.   will drive her    ICD-10-CM    1. Memory changes  R41.3       2. Shakiness  R25.1           SUBJECTIVE:  History of Present Illness-  Jhoana Hernandez, a 70-year-old female, reports not feeling well over the past 2 to 3 days.  She describes experiencing jitteriness, particularly in the mornings which improves slightly with eating.  She feels off and forgetful.  She feels unsafe to drive.  She is here with her .  She also notes a decline in mental sharpness and memory over the past year, with a marked change in the last three days. Jhoana mentions experiencing infrequent palpitations and periodic headaches, which she attributes to allergies, but denies any abnormal headaches, vision changes, hearing changes, chest pain, shortness of breath, numbness, tingling, nausea, vomiting, or abdominal pain. She has a history of gestational diabetes and is concerned about her blood sugar levels. Jhoana also reports feeling slightly unsteady while walking and has experienced mild chills over the past few days. She manages acid reflux with dietary adjustments, and the acid reflux has not significantly worsened over the last few days.        ROS: Pertinent ROS neg other than the symptoms noted above in the HPI.     OBJECTIVE:  BP (!) 166/84 (BP Location: Left arm, Patient Position: Sitting, Cuff Size: Adult Regular)   Pulse 71   Temp 97.8  F (36.6  C) (Tympanic)    Resp 12   Wt 55 kg (121 lb 3.2 oz)   LMP  (LMP Unknown)   SpO2 98%   BMI 20.87 kg/m     Physical Exam- NEUROLOGIC: Normal gait.. No focal neurological deficits noted; cranial nerves II-XII grossly intact. Coordination tests, including finger-to-nose and light touch, were performed without difficulty.  Romberg negative  GENERAL: alert and no distress  EYES: Eyes grossly normal to inspection, PERRL and conjunctivae and sclerae normal  RESP: lungs clear to auscultation - no rales, rhonchi or wheezes  CV: regular rate and rhythm, normal S1 S2, no S3 or S4, no murmur, click or rub, no peripheral edema   MS: no gross musculoskeletal defects noted, no edema  SKIN: no suspicious lesions or rashes  PSYCH: mentation appears normal, affect normal/bright    DIAGNOSTICS       No results found for any visits on 04/16/25.     Joselito Luz PA-C    Consent was obtained from the patient to use an AI documentation tool in the creation of this note.  Any grammatical or spelling errors are non-intentional. Please contact the author of this note directly if you are in need of any clarification.     Current Outpatient Medications   Medication Sig Dispense Refill    EPINEPHrine (ANY BX GENERIC EQUIV) 0.3 MG/0.3ML injection 2-pack Inject 0.3 mLs (0.3 mg) into the muscle as needed for anaphylaxis (or exposure to trigger) 2 each 0    Loratadine (CLARITIN PO) Take 1 tablet by mouth as needed      prednisoLONE acetate (PRED FORTE) 1 % ophthalmic suspension 4 drops into both ears twice as needed 10 mL 0    mometasone (NASONEX) 50 MCG/ACT nasal spray Spray 2 sprays into both nostrils daily for allergy prevention. (Patient not taking: Reported on 4/16/2025) 17 g 11    terbinafine (LAMISIL) 250 MG tablet Take 1 tablet (250 mg) by mouth daily (Patient not taking: Reported on 4/16/2025) 30 tablet 0     No current facility-administered medications for this visit.      Patient Active Problem List   Diagnosis    CARDIOVASCULAR SCREENING; LDL  GOAL LESS THAN 160    Lactose intolerance    Seasonal allergic rhinitis    Allergy, food    Subclinical hypothyroidism    Osteopenia    Hollenhorst plaque, left eye    Family history of breast cancer    Chronic eczematous otitis externa of both ears      Past Medical History:   Diagnosis Date    Benign head tremor     Gastritis     IBS (irritable bowel syndrome)     Infertility, female 1989    had IVF    Lactose intolerance     Osteopenia 2011    Ovarian cyst 2011    bilateral serous cystadenomas    Recurrent UTI     Seasonal allergic rhinitis      Past Surgical History:   Procedure Laterality Date     SECTION  ,     COLONOSCOPY  2012    Procedure: COLONOSCOPY;  COLONOSCOPY, ABDOMINAL PAIN;  Surgeon: Maco Mascorro MD;  Location: [facility] OR    HC EXCISION BREAST LESION, OPEN >=1 Left 2004    LT Benign    HYSTERECTOMY, PAP NO LONGER INDICATED  2011    JOSAFAT with BSO- benign bilateral serous cystadenomas    LAPAROSCOPY  1986    ov cystectomy    ZZC TOTAL ABDOM HYSTERECTOMY  2011    with BSO     Family History   Problem Relation Age of Onset    Asthma Sister     Thyroid Disease Sister     Lipids Mother     Food Allergy Mother     Thyroid Disease Mother     Cerebrovascular Disease Father     Asthma Father     Thyroid Disease Brother     Diabetes Type 2  Sister     Breast Cancer Sister 60    Thyroid Disease Sister     Graves' disease Sister     Thyroid Disease Sister     Other - See Comments Son     Other - See Comments Son     C.A.D. No family hx of     Hypertension No family hx of     Cancer - colorectal No family hx of     Prostate Cancer No family hx of      Social History     Tobacco Use    Smoking status: Never     Passive exposure: Never    Smokeless tobacco: Never   Substance Use Topics    Alcohol use: Yes     Comment: occassionally

## 2025-04-16 NOTE — PROGRESS NOTES
Chief Complaint   Patient presents with    Urgent Care     Pt c/o being jittery in the mornings and difficulty concentrating for 2 days. Also, slight headache which was relieve by claritin and states she has a history of allergies. Denies chest pain, shortness of breath, unilateral weakness, vision changes, facial drooping.           No diagnosis found.    SUBJECTIVE:        ROS: Pertinent ROS neg other than the symptoms noted above in the HPI.     OBJECTIVE:  BP (!) 166/84 (BP Location: Left arm, Patient Position: Sitting, Cuff Size: Adult Regular)   Pulse 71   Temp 97.8  F (36.6  C) (Tympanic)   Resp 12   Wt 55 kg (121 lb 3.2 oz)   LMP  (LMP Unknown)   SpO2 98%   BMI 20.87 kg/m             DIAGNOSTICS       No results found for any visits on 04/16/25.     Joselito Luz PA-C    Consent was obtained from the patient to use an AI documentation tool in the creation of this note.  Any grammatical or spelling errors are non-intentional. Please contact the author of this note directly if you are in need of any clarification.     Current Outpatient Medications   Medication Sig Dispense Refill    EPINEPHrine (ANY BX GENERIC EQUIV) 0.3 MG/0.3ML injection 2-pack Inject 0.3 mLs (0.3 mg) into the muscle as needed for anaphylaxis (or exposure to trigger) 2 each 0    Loratadine (CLARITIN PO) Take 1 tablet by mouth as needed      prednisoLONE acetate (PRED FORTE) 1 % ophthalmic suspension 4 drops into both ears twice as needed 10 mL 0    mometasone (NASONEX) 50 MCG/ACT nasal spray Spray 2 sprays into both nostrils daily for allergy prevention. (Patient not taking: Reported on 4/16/2025) 17 g 11    terbinafine (LAMISIL) 250 MG tablet Take 1 tablet (250 mg) by mouth daily (Patient not taking: Reported on 4/16/2025) 30 tablet 0     No current facility-administered medications for this visit.      Patient Active Problem List   Diagnosis    CARDIOVASCULAR SCREENING; LDL GOAL LESS THAN 160    Lactose intolerance    Seasonal  allergic rhinitis    Allergy, food    Subclinical hypothyroidism    Osteopenia    Hollenhorst plaque, left eye    Family history of breast cancer    Chronic eczematous otitis externa of both ears      Past Medical History:   Diagnosis Date    Benign head tremor     Gastritis     IBS (irritable bowel syndrome)     Infertility, female 1989    had IVF    Lactose intolerance     Osteopenia 2011    Ovarian cyst 2011    bilateral serous cystadenomas    Recurrent UTI     Seasonal allergic rhinitis      Past Surgical History:   Procedure Laterality Date     SECTION  ,     COLONOSCOPY  2012    Procedure: COLONOSCOPY;  COLONOSCOPY, ABDOMINAL PAIN;  Surgeon: Maco Mascorro MD;  Location: [facility]    HC EXCISION BREAST LESION, OPEN >=1 Left 2004    LT Benign    HYSTERECTOMY, PAP NO LONGER INDICATED  2011    JOSAFAT with BSO- benign bilateral serous cystadenomas    LAPAROSCOPY  1986    ov cystectomy    ZZC TOTAL ABDOM HYSTERECTOMY  2011    with BSO     Family History   Problem Relation Age of Onset    Asthma Sister     Thyroid Disease Sister     Lipids Mother     Food Allergy Mother     Thyroid Disease Mother     Cerebrovascular Disease Father     Asthma Father     Thyroid Disease Brother     Diabetes Type 2  Sister     Breast Cancer Sister 60    Thyroid Disease Sister     Graves' disease Sister     Thyroid Disease Sister     Other - See Comments Son     Other - See Comments Son     C.A.D. No family hx of     Hypertension No family hx of     Cancer - colorectal No family hx of     Prostate Cancer No family hx of      Social History     Tobacco Use    Smoking status: Never     Passive exposure: Never    Smokeless tobacco: Never   Substance Use Topics    Alcohol use: Yes     Comment: occassionally

## 2025-04-21 ENCOUNTER — OFFICE VISIT (OUTPATIENT)
Dept: FAMILY MEDICINE | Facility: CLINIC | Age: 70
End: 2025-04-21
Payer: MEDICARE

## 2025-04-21 VITALS
DIASTOLIC BLOOD PRESSURE: 80 MMHG | RESPIRATION RATE: 16 BRPM | TEMPERATURE: 96.9 F | SYSTOLIC BLOOD PRESSURE: 164 MMHG | HEART RATE: 77 BPM | BODY MASS INDEX: 20.91 KG/M2 | HEIGHT: 63 IN | OXYGEN SATURATION: 99 % | WEIGHT: 118 LBS

## 2025-04-21 DIAGNOSIS — R09.81 SINUS CONGESTION: ICD-10-CM

## 2025-04-21 DIAGNOSIS — Z91.018 ALLERGY, FOOD: ICD-10-CM

## 2025-04-21 DIAGNOSIS — Z28.21 VACCINATION NOT CARRIED OUT BECAUSE OF PATIENT REFUSAL: ICD-10-CM

## 2025-04-21 DIAGNOSIS — Z86.39 HISTORY OF ELEVATED GLUCOSE: ICD-10-CM

## 2025-04-21 DIAGNOSIS — E03.8 SUBCLINICAL HYPOTHYROIDISM: Primary | ICD-10-CM

## 2025-04-21 DIAGNOSIS — R79.89 INCREASE IN SERUM CREATININE FROM PRIOR MEASUREMENT: ICD-10-CM

## 2025-04-21 LAB
EST. AVERAGE GLUCOSE BLD GHB EST-MCNC: 105 MG/DL
FOLATE SERPL-MCNC: 30.5 NG/ML (ref 4.6–34.8)
HBA1C MFR BLD: 5.3 % (ref 0–5.6)

## 2025-04-21 PROCEDURE — 3078F DIAST BP <80 MM HG: CPT | Performed by: FAMILY MEDICINE

## 2025-04-21 PROCEDURE — 99214 OFFICE O/P EST MOD 30 MIN: CPT | Performed by: FAMILY MEDICINE

## 2025-04-21 PROCEDURE — 36415 COLL VENOUS BLD VENIPUNCTURE: CPT | Performed by: FAMILY MEDICINE

## 2025-04-21 PROCEDURE — 83036 HEMOGLOBIN GLYCOSYLATED A1C: CPT | Performed by: FAMILY MEDICINE

## 2025-04-21 PROCEDURE — 82746 ASSAY OF FOLIC ACID SERUM: CPT | Performed by: FAMILY MEDICINE

## 2025-04-21 PROCEDURE — 84443 ASSAY THYROID STIM HORMONE: CPT | Performed by: FAMILY MEDICINE

## 2025-04-21 PROCEDURE — 82607 VITAMIN B-12: CPT | Performed by: FAMILY MEDICINE

## 2025-04-21 PROCEDURE — 1125F AMNT PAIN NOTED PAIN PRSNT: CPT | Performed by: FAMILY MEDICINE

## 2025-04-21 PROCEDURE — 3077F SYST BP >= 140 MM HG: CPT | Performed by: FAMILY MEDICINE

## 2025-04-21 PROCEDURE — 80048 BASIC METABOLIC PNL TOTAL CA: CPT | Performed by: FAMILY MEDICINE

## 2025-04-21 RX ORDER — AZITHROMYCIN 250 MG/1
TABLET, FILM COATED ORAL
Qty: 6 TABLET | Refills: 0 | Status: SHIPPED | OUTPATIENT
Start: 2025-04-21 | End: 2025-04-26

## 2025-04-21 RX ORDER — EPINEPHRINE 0.3 MG/.3ML
0.3 INJECTION SUBCUTANEOUS PRN
Qty: 2 EACH | Refills: 0 | Status: SHIPPED | OUTPATIENT
Start: 2025-04-21

## 2025-04-21 ASSESSMENT — PAIN SCALES - GENERAL: PAINLEVEL_OUTOF10: MILD PAIN (2)

## 2025-04-21 NOTE — PROGRESS NOTES
Assessment & Plan     (E03.8) Subclinical hypothyroidism  (primary encounter diagnosis)  Comment: present for years. By definition, the free T4 is usually normal, but it was low on recent check in the ED.  Plan: TSH with free T4 reflex, Vitamin B12, Folate        Consider treating for hypothyroidism based on today's results. We can discuss this at next visit as well. Return in about 7 weeks (around 6/10/2025) for Medicare annual wellness exam, lab tests, recheck medications.  Handout(s) provided.    (Z86.39) History of elevated glucose  Comment:   Plan: Basic metabolic panel, Vitamin B12, Folate,         Hemoglobin A1c            (R79.89) Increase in serum creatinine from prior measurement  Comment: She is also noted to have an elevated BP today, but she does not have a diagnosis of HTN.   Plan: Basic metabolic panel, Vitamin B12, Folate            (Z91.018) Allergy, food  Comment: refill request.  Plan: EPINEPHrine (ANY BX GENERIC EQUIV) 0.3 MG/0.3ML        injection 2-pack            (R09.81) Sinus congestion  Comment: rule-out acute sinusitis causing her cheek and teeth pain, and some of the vague symptoms described below. She notes that she generally does well using this antibiotic.   Plan: azithromycin (ZITHROMAX) 250 MG tablet        Handout(s) provided for stroke.    (Z28.21) Vaccination not carried out because of patient refusal  Comment: COVID-19.   Plan: She prefers to wait until her next visit.     Ann Ely is a 70 year old, presenting for the following health issues:  Results and Hospital F/U      4/21/2025    11:01 AM   Additional Questions   Roomed by ernst   Accompanied by self         4/21/2025    11:01 AM   Patient Reported Additional Medications   Patient reports taking the following new medications none     Sinus Problem         ED/UC Followup:    Facility:  [facility]  Date of visit: 04/16/2025   Reason for visit: Headache/ light headiness   Current Status: patient  "states she is jittery, patient states still having the brain fog especially in the morning and seems to improve in the evening, and still has a headache   Patient states she been having a lot more sinus drainage, having some crusty , fatigue and is wondering if that could be.      About 1 week ago, she felt under the weather with chills and a headache. Currently she still has a headache but not the chills. She notes morning brain fog and jitters that has been going on for a while. These symptoms have been keeping her from driving and wanting to go out. She thought she had a virus or sinus problem. She has tried eating and drinking more water but it doesn't seem to be helping. She uses Claritin once or twice a spring, not a daily med. Patient also notes that she has been feeling pressure and pain on her cheek bones, ears, eyes, and she has had nasal congestion with slight drainage.     Review of Systems      Objective    BP (!) 167/81 (BP Location: Right arm, Patient Position: Sitting, Cuff Size: Adult Small)   Pulse 77   Temp 96.9  F (36.1  C) (Temporal)   Resp 16   Ht 1.6 m (5' 3\")   Wt 53.5 kg (118 lb)   LMP  (LMP Unknown)   SpO2 99%   BMI 20.90 kg/m    Body mass index is 20.9 kg/m .    Physical Exam   GENERAL: alert and no distress  EYES: Eyes grossly normal to inspection, PERRL and conjunctivae and sclerae normal  HENT: ear canals and TM's normal, nose and mouth without ulcers or lesions  NECK: no adenopathy, no asymmetry, masses, or scars  RESP: lungs clear to auscultation - no rales, rhonchi or wheezes  CV: regular rate and rhythm, normal S1 S2, no S3 or S4, no murmur, click or rub, no peripheral edema  ABDOMEN: soft, nontender, no hepatosplenomegaly, no masses and bowel sounds normal  MS: no gross musculoskeletal defects noted, no edema  SKIN: no suspicious lesions or rashes  NEURO: Normal strength and tone, mentation intact and speech normal  PSYCH: mentation appears normal, affect normal/bright    "   This document serves as a record of the services and decisions personally performed and made by Dr. Camp. It was created on his behalf by Ernestina Boyce, a trained medical scribe. The creation of this document is based the provider's statements to the medical scribe.  Ernestina Boyce, 11:34 AM         Signed Electronically by: Nelson Camp MD

## 2025-04-21 NOTE — PATIENT INSTRUCTIONS
At [facility], we strive to deliver an exceptional experience to you, every time we see you. If you receive a survey, please let us know what we are doing well and/or what we could improve upon, as we do value your feedback.  If you have MyChart, you can expect to receive results automatically within 24 hours of their completion.  Your provider will send a note interpreting your results as well.   If you do not have MyChart, you should receive your results in about a week by mail.    Your care team:                            Family Medicine Internal Medicine   MD Eric Cheema, MD Mariana Sandoval, MD Ramiro Diaz, MD Shila Kaye, PA-C    Marcus Stevenson, MD Pediatrics   Barbara Clarke, MD Jenn Acosta, JOHNNIE Peñaloza CNP Nafisa Seymour, MD Lupis Wade, MD Miryam Mayorga, CNP     Josette Ceballos, PA-C Same-Day Provider (No follow-up visits)   JOHNNIE Lyons, ULISES Wagoner, PA-C    Brandee Morgan PA-C     Clinic hours: Monday - Thursday 7 am-6 pm; Fridays 7 am-5 pm.   Urgent care: Monday - Friday 10 am- 8 pm; Saturday and Sunday 9 am-5 pm.    Clinic: [phone]       [facility]: Monday - Thursday 8 am - 7 pm; Friday 8 am - 6 pm  [facility]: [phone]

## 2025-04-22 LAB
ANION GAP SERPL CALCULATED.3IONS-SCNC: 11 MMOL/L (ref 7–15)
BUN SERPL-MCNC: 18.6 MG/DL (ref 8–23)
CALCIUM SERPL-MCNC: 9.4 MG/DL (ref 8.8–10.4)
CHLORIDE SERPL-SCNC: 100 MMOL/L (ref 98–107)
CREAT SERPL-MCNC: 0.99 MG/DL (ref 0.51–0.95)
EGFRCR SERPLBLD CKD-EPI 2021: 61 ML/MIN/1.73M2
GLUCOSE SERPL-MCNC: 99 MG/DL (ref 70–99)
HCO3 SERPL-SCNC: 27 MMOL/L (ref 22–29)
POTASSIUM SERPL-SCNC: 4.3 MMOL/L (ref 3.4–5.3)
SODIUM SERPL-SCNC: 138 MMOL/L (ref 135–145)
TSH SERPL DL<=0.005 MIU/L-ACNC: 2.96 UIU/ML (ref 0.3–4.2)
VIT B12 SERPL-MCNC: 423 PG/ML (ref 232–1245)

## 2025-04-30 ENCOUNTER — OFFICE VISIT (OUTPATIENT)
Dept: FAMILY MEDICINE | Facility: CLINIC | Age: 70
End: 2025-04-30
Payer: MEDICARE

## 2025-04-30 VITALS
OXYGEN SATURATION: 98 % | HEART RATE: 76 BPM | BODY MASS INDEX: 19.97 KG/M2 | DIASTOLIC BLOOD PRESSURE: 92 MMHG | HEIGHT: 64 IN | RESPIRATION RATE: 18 BRPM | TEMPERATURE: 97.7 F | WEIGHT: 117 LBS | SYSTOLIC BLOOD PRESSURE: 137 MMHG

## 2025-04-30 DIAGNOSIS — R09.81 SINUS CONGESTION: Primary | ICD-10-CM

## 2025-04-30 DIAGNOSIS — Z28.21 VACCINATION NOT CARRIED OUT BECAUSE OF PATIENT REFUSAL: ICD-10-CM

## 2025-04-30 PROCEDURE — 3079F DIAST BP 80-89 MM HG: CPT | Performed by: FAMILY MEDICINE

## 2025-04-30 PROCEDURE — 3075F SYST BP GE 130 - 139MM HG: CPT | Performed by: FAMILY MEDICINE

## 2025-04-30 PROCEDURE — 99214 OFFICE O/P EST MOD 30 MIN: CPT | Performed by: FAMILY MEDICINE

## 2025-04-30 RX ORDER — TRIAMCINOLONE ACETONIDE 55 UG/1
2 SPRAY, METERED NASAL DAILY
Qty: 16.9 ML | Refills: 1 | Status: SHIPPED | OUTPATIENT
Start: 2025-04-30

## 2025-04-30 RX ORDER — CETIRIZINE HYDROCHLORIDE 10 MG/1
10 TABLET ORAL DAILY PRN
COMMUNITY
Start: 2025-04-30

## 2025-04-30 NOTE — PATIENT INSTRUCTIONS
Please call [facility]: [phone] to schedule your sinus CT.       At [facility], we strive to deliver an exceptional experience to you, every time we see you. If you receive a survey, please let us know what we are doing well and/or what we could improve upon, as we do value your feedback.  If you have MyChart, you can expect to receive results automatically within 24 hours of their completion.  Your provider will send a note interpreting your results as well.   If you do not have MyChart, you should receive your results in about a week by mail.    Your care team:                            Family Medicine Internal Medicine   MD Eric Cheema, MD Mariana Sandoval, MD Ramiro Diaz, MD Shila Kaye, PAGISSELL Stevenson, MD Pediatrics   Barbara Clrake, MD Jenn Acosta, MD Arielle Bae, APRN CNP Nafisa BLAIR CNP   Candice Seymour, MD Lupis Wade, MD Miryam Mayorga, CNP     Josette Ceballos, PAGISSELL Same-Day Provider (No follow-up visits)   JOHNNIE Lyons, DNP HERMAN Preston PA-C     Clinic hours: Monday - Thursday 7 am-6 pm; Fridays 7 am-5 pm.   Urgent care: Monday - Friday 10 am- 8 pm; Saturday and Sunday 9 am-5 pm.    Clinic: [phone]       [facility]: Monday - Thursday 8 am - 7 pm; Friday 8 am - 6 pm  [facility]: [phone]

## 2025-04-30 NOTE — PROGRESS NOTES
Assessment & Plan     (R09.81) Sinus congestion  (primary encounter diagnosis)  Comment: Partial improvement with recent azithromycin, and partial improvement when she takes loratadine. I am highly suspicious of ALMAS of being the underlying cause of her symptoms rather than a sinus infection. As I explained to the patient, I prefer to prove that she has a sinus infection rather than treating empirically for one, and she is reluctantly willing to take this approach. I will maximize her allergy treatment until she gets her CT scan.   Plan: CT Sinus w/o Contrast, triamcinolone (NASACORT)        55 MCG/ACT nasal aerosol, cetirizine (ZYRTEC)         10 MG tablet        Treat acute sinusitis if present. Follow-up with ENT if other abnormalities are noted.     (Z28.21) Vaccination not carried out because of patient refusal  Comment: COVID-19.   Plan:     Return in about 6 weeks (around 6/10/2025) for Medicare annual wellness exam.        30 minutes spent by me on the date of the encounter doing chart review, review of test results, patient visit, documentation, and this includes 25 minutes in the exam room with the patient.        Ann Ely is a 70 year old, presenting for the following health issues:  Sinus Problem        4/30/2025     3:33 PM   Additional Questions   Roomed by [email or web address]   Accompanied by self         4/30/2025     3:33 PM   Patient Reported Additional Medications   Patient reports taking the following new medications no     History of Present Illness       Reason for visit:  Sinus infection    She eats 2-3 servings of fruits and vegetables daily.She exercises with enough effort to increase her heart rate 20 to 29 minutes per day.  She exercises with enough effort to increase her heart rate 5 days per week.   She is taking medications regularly.      Acute Illness  Acute illness concerns: sinus problem   Onset/Duration: onset of general and URI symptoms approximate 4/14/25   Symptoms:  Fever:  "No  Chills/Sweats: Yes, chills  Headache (location?): YES, bilateral temporal and retroorbital   Sinus Pressure: YES, maxillary, and some upper teeth pain.  Conjunctivitis:  sleepy eye   Ear Pain: YES- around ears   Rhinorrhea: No  Congestion: No  Sore Throat: No  Cough: no  Wheeze: No  Decreased Appetite: No  Nausea: No  Vomiting: No  Diarrhea: No  Dysuria/Freq.: No  Dysuria or Hematuria: No  Fatigue/Achiness: Yes, fatigue   Sick/Strep Exposure: No  Therapies tried and outcome: finish antibiotic which it helped at the beginning. Started to have same symptoms after finishing antibiotic     Her current symptoms she reports a waxing and waning headache and pains under her eyes, upper teeth, and around her ears. She has had some relief taking Claritin.     Review of Systems        Objective    BP (!) 137/92 (BP Location: Right arm, Patient Position: Sitting, Cuff Size: Adult Regular)   Pulse 76   Temp 97.7  F (36.5  C) (Oral)   Resp 18   Ht 1.613 m (5' 3.5\")   Wt 53.1 kg (117 lb)   LMP  (LMP Unknown)   SpO2 98%   BMI 20.40 kg/m    Body mass index is 20.4 kg/m .    Physical Exam   GENERAL: healthy, alert and no distress  EYES: Eyes grossly normal to inspection, PERRL, EOMI, sclerae white and conjunctivae normal  MS: no gross musculoskeletal defects noted, no edema  SKIN: no suspicious lesions or rashes to visible skin  NEURO: Normal strength and tone, sensory exam grossly normal, mentation intact, oriented times 3 and cranial nerves 2-12 intact  PSYCH: mentation appears normal, affect normal/bright     Office Visit on 04/21/2025   Component Date Value Ref Range Status    Sodium 04/21/2025 138  135 - 145 mmol/L Final    Potassium 04/21/2025 4.3  3.4 - 5.3 mmol/L Final    Chloride 04/21/2025 100  98 - 107 mmol/L Final    Carbon Dioxide (CO2) 04/21/2025 27  22 - 29 mmol/L Final    Anion Gap 04/21/2025 11  7 - 15 mmol/L Final    Urea Nitrogen 04/21/2025 18.6  8.0 - 23.0 mg/dL Final    Creatinine 04/21/2025 0.99 (H)  " 0.51 - 0.95 mg/dL Final    GFR Estimate 04/21/2025 61  >60 mL/min/1.73m2 Final    eGFR calculated using 2021 CKD-EPI equation.    Calcium 04/21/2025 9.4  8.8 - 10.4 mg/dL Final    Glucose 04/21/2025 99  70 - 99 mg/dL Final    TSH 04/21/2025 2.96  0.30 - 4.20 uIU/mL Final    Vitamin B12 04/21/2025 423  232 - 1,245 pg/mL Final    Folic Acid 04/21/2025 30.5  4.6 - 34.8 ng/mL Final    Estimated Average Glucose 04/21/2025 105  <117 mg/dL Final    Hemoglobin A1C 04/21/2025 5.3  0.0 - 5.6 % Final    Normal <5.7%   Prediabetes 5.7-6.4%    Diabetes 6.5% or higher     Note: Adopted from ADA consensus guidelines.          This document serves as a record of the services and decisions personally performed and made by Dr. Camp. It was created on his behalf by Ernestina Boyce, a trained medical scribe. The creation of this document is based the provider's statements to the medical scribe.  Ernestina Boyce, 4:11 PM         Signed Electronically by: Nelson Camp MD

## 2025-05-09 ENCOUNTER — ANCILLARY PROCEDURE (OUTPATIENT)
Dept: CT IMAGING | Facility: CLINIC | Age: 70
End: 2025-05-09
Attending: FAMILY MEDICINE
Payer: MEDICARE

## 2025-05-09 DIAGNOSIS — R09.81 SINUS CONGESTION: ICD-10-CM

## 2025-05-09 PROCEDURE — 70486 CT MAXILLOFACIAL W/O DYE: CPT | Performed by: RADIOLOGY

## 2025-05-22 NOTE — PROGRESS NOTES
Chief Complaint   Patient presents with    Follow Up     Ear cleaning.   Recently had a sinus infection that showed up on a CT and treated with EES. She stated she did not have any symptoms of a sinus infection. She had Head aches and light headedness, was sent to the ER to rule out a stroke and and MRI was done then. She continued to have the headaches after the antibiotic and then started an allergy pill, which helped.       PCP: Nelson Camp     Referring Provider: No ref. provider found    LMP  (LMP Unknown)     ENT Problem List:  Patient Active Problem List   Diagnosis    CARDIOVASCULAR SCREENING; LDL GOAL LESS THAN 160    Lactose intolerance    Seasonal allergic rhinitis    Allergy, food    Subclinical hypothyroidism    Osteopenia    Hollenhorst plaque, left eye    Family history of breast cancer    Chronic eczematous otitis externa of both ears      Current Medications:  Current Outpatient Medications   Medication Sig Dispense Refill    cetirizine (ZYRTEC) 10 MG tablet Take 1 tablet (10 mg) by mouth daily as needed for allergies.      EPINEPHrine (ANY BX GENERIC EQUIV) 0.3 MG/0.3ML injection 2-pack Inject 0.3 mLs (0.3 mg) into the muscle as needed for anaphylaxis (or exposure to trigger). 2 each 0    Loratadine (CLARITIN PO) Take 1 tablet by mouth as needed      prednisoLONE acetate (PRED FORTE) 1 % ophthalmic suspension 4 drops into both ears twice as needed 10 mL 0    triamcinolone (NASACORT) 55 MCG/ACT nasal aerosol Spray 2 sprays into both nostrils daily. for allergy prevention. 16.9 mL 1     No current facility-administered medications for this visit.     Surgical History:  Past Surgical History:   Procedure Laterality Date     SECTION  ,     COLONOSCOPY  2012    Procedure: COLONOSCOPY;  COLONOSCOPY, ABDOMINAL PAIN;  Surgeon: Maco Mascorro MD;  Location: [facility] OR    HC EXCISION BREAST LESION, OPEN >=1 Left 2004    LT Benign    HYSTERECTOMY, PAP NO LONGER INDICATED   01/01/2011    JOSAFAT with BSO- benign bilateral serous cystadenomas    LAPAROSCOPY  01/01/1986    ov cystectomy    ZZC TOTAL ABDOM HYSTERECTOMY  01/01/2011    with BSO     CT SINUS W/O CONTRAST 5/9/2025  Provided History: rule-out sinusitis, likely maxillary; Sinus congestion  Comparison: None.      Technique:  Using thin collimation multidetector helical acquisition technique, axial, coronal, and sagittal thin section CT images were reconstructed through the paranasal sinuses. Images were reviewed in bone and soft tissue windows.     Findings:   Maxillary sinuses: Mucous retention cysts bilaterally.  Sphenoid sinus: clear.  Frontal sinus: clear.  Ethmoid air cells: clear.     The ostiomeatal units appear patent bilaterally. The bony walls of the paranasal sinuses are intact.     Normal retromaxillary and pterygopalatine fat. Leftward nasal septal deviation. Question small osteoma within the right frontoethmoid recess (2/43).     The adenoid tonsils in the nasopharynx are unremarkable.     Impression:  Mucous retention cysts within the bilateral maxillary sinuses, otherwise clear paranasal sinuses.    MRI BRAIN LIMITED W/O CON  4/16/2025  CLINICAL INFORMATION: Confusion  COMPARISON: None.  TECHNIQUE: BRAIN: Axial DWI, axial FLAIR.    FINDINGS:    MRI BRAIN:  PREVIOUS SURGERY: None.  BRAIN PARENCHYMA: There is no restricted diffusion or other evidence of acute infarct. No chronic infarct demonstrated.   There is no acute hemorrhage demonstrated. No evidence of chronic hemorrhage.   There is no abnormal mass lesion or mass effect demonstrated.]  VENTRICLES/BRAIN VOLUME: Normal for age.    WHITE MATTER: A few nonspecific T2 signal hyperintensities are present in the supratentorial white matter, likely representing gliosis from a remote vascular or inflammatory process.  DEEP GREY NUCLEI: No acute abnormality.  POSTERIOR FOSSA:   Normal cerebellum and brain stem. No acute abnormality.  OTHER FINDINGS:  No other  findings.    IMPRESSION:  1.  No acute abnormality. Specifically, no evidence of acute ischemic infarct, hemorrhage, or abnormal mass lesion.  2.  Few nonspecific T2 signal hyperintensities in the supratentorial white matter likely represent gliosis from a remote vascular or inflammatory process.    HPI  Pleasant 70 year old female presents today as an established patient for an ear cleaning and to review MRI results. She was previously seen by Dr Pepper on 01/27/2025 having bilateral cerumen impaction and bilateral sensorineural hearing loss. She was previously seen by me on 05/08/2024, where she had right ear pain but minimal cerumen in the ear upon examination. She has seasonal allergies. She has an occasional sinus infection maybe once a year. The had a Ct that shown a sinus infection that was largely asymptomatic. She was experiencing headaches and lightheadedness, was sent into the R for a possible stroke. She described the headaches as pressure, and felt soreness under the cheekbones. She was given antibiotics, and then later an allergy pill; which she found helpful. Her eyes have been dry and sticky, but not itchy. She suspects that the Zyrtec was drying her out. She switched back to Claritin, which resolved that issue. She is also taking the Nasacort. She has found benefit using then consistently.     Review of Systems   Constitutional: Negative.    HENT: Negative.     Eyes: Negative.    Respiratory: Negative.     Gastrointestinal: Negative.    Musculoskeletal: Negative.    Skin: Negative.    Neurological:  Positive for headaches.   Endo/Heme/Allergies:  Positive for environmental allergies.   Psychiatric/Behavioral: Negative.     All other systems reviewed and are negative.      Physical Exam  Vitals and nursing note reviewed.   Constitutional:       General: She is awake.      Appearance: Normal appearance.   HENT:      Head: Normocephalic and atraumatic.      Right Ear: Ear canal and external ear normal.  No middle ear effusion. Tympanic membrane is scarred.      Left Ear: Ear canal and external ear normal.  No middle ear effusion. Tympanic membrane is scarred.      Nose: Septal deviation present.      Right Turbinates: Enlarged.      Left Turbinates: Enlarged.      Mouth/Throat:      Mouth: Mucous membranes are moist.   Eyes:      Extraocular Movements: Extraocular movements intact.      Pupils: Pupils are equal, round, and reactive to light.   Neurological:      Mental Status: She is alert.   Psychiatric:         Behavior: Behavior is cooperative.       Procedure: Ear wax removal  The patient was seen in the room. An informed consent was obtained from the patient. Her ears were evaluated under the microscope. Right ear canal was blocked 60% with ear wax that was suctioned and removed with an alligator. The left ear canal was blocked 60% with ear wax that was suctioned with a 7 tip. She tolerated the procedure well and left the room no complications.     A/P  This pleasant patient has allergic rhinitis, mild deviated nasal septum, and nasal turbinate hypertrophy. The patient's prior records and imaging were independently reviewed and discussed with the patient. CT scan shown some mucous retention cysts in the bilateral maxillary sinuses. There patient was reassured that there were no visualizations that would warrant further investigation. Her ears were cleared of cerumen impaction. Physical examination shown no signs of infection. She also has a deviated nasal septum that favors the left and bilateral The symptoms that she is describing is consistent with allergies. Topical nasal sprays including mild steroids, antihistaminic Azelastine were reviewed. She has been advised to use an OTC nasal spray, Zyrtec, and/or Claritin as needed, with the advise to try the nasal spray first. All of the patient's questions were answered to her satisfaction. She has been encouraged to reach out via [employer] for any concerns. She is  in agreement with this plan and will return as needed.            Follow up in clinic as needed.    Scribe/Staff:    Scribe Disclosure:   I, Araceli Arrington, am serving as a scribe; to document services personally performed by Adrian Lagunas MD based on data collection and the provider's statements to me.     Provider Disclosure:  I agree with above History, Review of Systems, Physical exam and Plan.  I have reviewed the content of the documentation and have edited it as needed. I have personally performed the services documented here and the documentation accurately represents those services and the decisions I have made.      Electronically signed by:  Adrian Lagunas MD

## 2025-05-26 ENCOUNTER — PATIENT OUTREACH (OUTPATIENT)
Dept: CARE COORDINATION | Facility: CLINIC | Age: 70
End: 2025-05-26
Payer: MEDICARE

## 2025-05-27 ENCOUNTER — OFFICE VISIT (OUTPATIENT)
Dept: OTOLARYNGOLOGY | Facility: CLINIC | Age: 70
End: 2025-05-27
Payer: MEDICARE

## 2025-05-27 DIAGNOSIS — H61.23 BILATERAL IMPACTED CERUMEN: Primary | ICD-10-CM

## 2025-05-27 DIAGNOSIS — J30.1 SEASONAL ALLERGIC RHINITIS DUE TO POLLEN: ICD-10-CM

## 2025-05-27 PROCEDURE — 99213 OFFICE O/P EST LOW 20 MIN: CPT | Mod: 25 | Performed by: OTOLARYNGOLOGY

## 2025-05-27 PROCEDURE — 69210 REMOVE IMPACTED EAR WAX UNI: CPT | Performed by: OTOLARYNGOLOGY

## 2025-05-27 ASSESSMENT — ENCOUNTER SYMPTOMS
GASTROINTESTINAL NEGATIVE: 1
HEADACHES: 1
CONSTITUTIONAL NEGATIVE: 1
RESPIRATORY NEGATIVE: 1
PSYCHIATRIC NEGATIVE: 1
EYES NEGATIVE: 1
MUSCULOSKELETAL NEGATIVE: 1

## 2025-05-27 NOTE — LETTER
5/27/2025      Jhoana Hernandez  [address] [place] [address]      Dear Colleague,    Thank you for referring your patient, Jhoana Hernandez, to the [facility]. Please see a copy of my visit note below.    Chief Complaint   Patient presents with     Follow Up     Ear cleaning.   Recently had a sinus infection that showed up on a CT and treated with EES. She stated she did not have any symptoms of a sinus infection. She had Head aches and light headedness, was sent to the ER to rule out a stroke and and MRI was done then. She continued to have the headaches after the antibiotic and then started an allergy pill, which helped.       PCP: Nelson Camp     Referring Provider: No ref. provider found    LMP  (LMP Unknown)     ENT Problem List:  Patient Active Problem List   Diagnosis     CARDIOVASCULAR SCREENING; LDL GOAL LESS THAN 160     Lactose intolerance     Seasonal allergic rhinitis     Allergy, food     Subclinical hypothyroidism     Osteopenia     Hollenhorst plaque, left eye     Family history of breast cancer     Chronic eczematous otitis externa of both ears      Current Medications:  Current Outpatient Medications   Medication Sig Dispense Refill     cetirizine (ZYRTEC) 10 MG tablet Take 1 tablet (10 mg) by mouth daily as needed for allergies.       EPINEPHrine (ANY BX GENERIC EQUIV) 0.3 MG/0.3ML injection 2-pack Inject 0.3 mLs (0.3 mg) into the muscle as needed for anaphylaxis (or exposure to trigger). 2 each 0     Loratadine (CLARITIN PO) Take 1 tablet by mouth as needed       prednisoLONE acetate (PRED FORTE) 1 % ophthalmic suspension 4 drops into both ears twice as needed 10 mL 0     triamcinolone (NASACORT) 55 MCG/ACT nasal aerosol Spray 2 sprays into both nostrils daily. for allergy prevention. 16.9 mL 1     No current facility-administered medications for this visit.     Surgical History:  Past Surgical History:   Procedure Laterality Date       SECTION  ,      COLONOSCOPY  2012    Procedure: COLONOSCOPY;  COLONOSCOPY, ABDOMINAL PAIN;  Surgeon: Maco Mascorro MD;  Location: [facility] OR     HC EXCISION BREAST LESION, OPEN >=1 Left 2004    LT Benign     HYSTERECTOMY, PAP NO LONGER INDICATED  2011    JOSAFAT with BSO- benign bilateral serous cystadenomas     LAPAROSCOPY  1986    ov cystectomy     ZZC TOTAL ABDOM HYSTERECTOMY  2011    with BSO     CT SINUS W/O CONTRAST 2025  Provided History: rule-out sinusitis, likely maxillary; Sinus congestion  Comparison: None.      Technique:  Using thin collimation multidetector helical acquisition technique, axial, coronal, and sagittal thin section CT images were reconstructed through the paranasal sinuses. Images were reviewed in bone and soft tissue windows.     Findings:   Maxillary sinuses: Mucous retention cysts bilaterally.  Sphenoid sinus: clear.  Frontal sinus: clear.  Ethmoid air cells: clear.     The ostiomeatal units appear patent bilaterally. The bony walls of the paranasal sinuses are intact.     Normal retromaxillary and pterygopalatine fat. Leftward nasal septal deviation. Question small osteoma within the right frontoethmoid recess (2/43).     The adenoid tonsils in the nasopharynx are unremarkable.     Impression:  Mucous retention cysts within the bilateral maxillary sinuses, otherwise clear paranasal sinuses.    MRI BRAIN LIMITED W/O CON  2025  CLINICAL INFORMATION: Confusion  COMPARISON: None.  TECHNIQUE: BRAIN: Axial DWI, axial FLAIR.    FINDINGS:    MRI BRAIN:  PREVIOUS SURGERY: None.  BRAIN PARENCHYMA: There is no restricted diffusion or other evidence of acute infarct. No chronic infarct demonstrated.   There is no acute hemorrhage demonstrated. No evidence of chronic hemorrhage.   There is no abnormal mass lesion or mass effect demonstrated.]  VENTRICLES/BRAIN VOLUME: Normal for age.    WHITE MATTER: A few nonspecific T2 signal  hyperintensities are present in the supratentorial white matter, likely representing gliosis from a remote vascular or inflammatory process.  DEEP GREY NUCLEI: No acute abnormality.  POSTERIOR FOSSA:   Normal cerebellum and brain stem. No acute abnormality.  OTHER FINDINGS:  No other findings.    IMPRESSION:  1.  No acute abnormality. Specifically, no evidence of acute ischemic infarct, hemorrhage, or abnormal mass lesion.  2.  Few nonspecific T2 signal hyperintensities in the supratentorial white matter likely represent gliosis from a remote vascular or inflammatory process.    HPI  Pleasant 70 year old female presents today as an established patient for an ear cleaning and to review MRI results. She was previously seen by Dr Pepper on 01/27/2025 having bilateral cerumen impaction and bilateral sensorineural hearing loss. She was previously seen by me on 05/08/2024, where she had right ear pain but minimal cerumen in the ear upon examination. She has seasonal allergies. She has an occasional sinus infection maybe once a year. The had a Ct that shown a sinus infection that was largely asymptomatic. She was experiencing headaches and lightheadedness, was sent into the R for a possible stroke. She described the headaches as pressure, and felt soreness under the cheekbones. She was given antibiotics, and then later an allergy pill; which she found helpful. Her eyes have been dry and sticky, but not itchy. She suspects that the Zyrtec was drying her out. She switched back to Claritin, which resolved that issue. She is also taking the Nasacort. She has found benefit using then consistently.     Review of Systems   Constitutional: Negative.    HENT: Negative.     Eyes: Negative.    Respiratory: Negative.     Gastrointestinal: Negative.    Musculoskeletal: Negative.    Skin: Negative.    Neurological:  Positive for headaches.   Endo/Heme/Allergies:  Positive for environmental allergies.   Psychiatric/Behavioral: Negative.      All other systems reviewed and are negative.      Physical Exam  Vitals and nursing note reviewed.   Constitutional:       General: She is awake.      Appearance: Normal appearance.   HENT:      Head: Normocephalic and atraumatic.      Right Ear: Ear canal and external ear normal. No middle ear effusion. Tympanic membrane is scarred.      Left Ear: Ear canal and external ear normal.  No middle ear effusion. Tympanic membrane is scarred.      Nose: Septal deviation present.      Right Turbinates: Enlarged.      Left Turbinates: Enlarged.      Mouth/Throat:      Mouth: Mucous membranes are moist.   Eyes:      Extraocular Movements: Extraocular movements intact.      Pupils: Pupils are equal, round, and reactive to light.   Neurological:      Mental Status: She is alert.   Psychiatric:         Behavior: Behavior is cooperative.       Procedure: Ear wax removal  The patient was seen in the room. An informed consent was obtained from the patient. Her ears were evaluated under the microscope. Right ear canal was blocked 60% with ear wax that was suctioned and removed with an alligator. The left ear canal was blocked 60% with ear wax that was suctioned with a 7 tip. She tolerated the procedure well and left the room no complications.     A/P  This pleasant patient has allergic rhinitis, mild deviated nasal septum, and nasal turbinate hypertrophy. The patient's prior records and imaging were independently reviewed and discussed with the patient. CT scan shown some mucous retention cysts in the bilateral maxillary sinuses. There patient was reassured that there were no visualizations that would warrant further investigation. Her ears were cleared of cerumen impaction. Physical examination shown no signs of infection. She also has a deviated nasal septum that favors the left and bilateral The symptoms that she is describing is consistent with allergies. Topical nasal sprays including mild steroids, antihistaminic Azelastine  were reviewed. She has been advised to use an OTC nasal spray, Zyrtec, and/or Claritin as needed, with the advise to try the nasal spray first. All of the patient's questions were answered to her satisfaction. She has been encouraged to reach out via [employer] for any concerns. She is in agreement with this plan and will return as needed.            Follow up in clinic as needed.    Scribe/Staff:    Scribe Disclosure:   I, Araceli Arrington, am serving as a scribe; to document services personally performed by Adrian Lagunas MD based on data collection and the provider's statements to me.     Provider Disclosure:  I agree with above History, Review of Systems, Physical exam and Plan.  I have reviewed the content of the documentation and have edited it as needed. I have personally performed the services documented here and the documentation accurately represents those services and the decisions I have made.      Electronically signed by:  Adrian Lagunas MD         Again, thank you for allowing me to participate in the care of your patient.        Sincerely,        Adrian Lagunas MD    Electronically signed

## 2025-05-27 NOTE — NURSING NOTE
Jhoana Hernandez's chief complaint for this visit includes:  Chief Complaint   Patient presents with    Follow Up     Ear cleaning.   Recently had a sinus infection that showed up on a CT and treated with EES. She stated she did not have any symptoms of a sinus infection. She had Head aches and light headedness, was sent to the ER to rule out a stroke and and MRI was done then. She continued to have the headaches after the antibiotic and then started an allergy pill, which helped.      PCP: Nelson Camp    Referring Provider:  Referred Self, MD  No address on file    LMP  (LMP Unknown)

## 2025-06-17 ENCOUNTER — OFFICE VISIT (OUTPATIENT)
Dept: FAMILY MEDICINE | Facility: CLINIC | Age: 70
End: 2025-06-17
Payer: MEDICARE

## 2025-06-17 VITALS
OXYGEN SATURATION: 100 % | HEART RATE: 61 BPM | WEIGHT: 118 LBS | TEMPERATURE: 97.6 F | SYSTOLIC BLOOD PRESSURE: 139 MMHG | HEIGHT: 63 IN | DIASTOLIC BLOOD PRESSURE: 87 MMHG | RESPIRATION RATE: 18 BRPM | BODY MASS INDEX: 20.91 KG/M2

## 2025-06-17 DIAGNOSIS — Z00.00 ENCOUNTER FOR MEDICARE ANNUAL WELLNESS EXAM: Primary | ICD-10-CM

## 2025-06-17 DIAGNOSIS — Z23 NEED FOR VACCINATION: ICD-10-CM

## 2025-06-17 DIAGNOSIS — Z12.11 SCREEN FOR COLON CANCER: ICD-10-CM

## 2025-06-17 DIAGNOSIS — H34.212 HOLLENHORST PLAQUE, LEFT EYE: ICD-10-CM

## 2025-06-17 LAB
BASOPHILS # BLD AUTO: 0.1 10E3/UL (ref 0–0.2)
BASOPHILS NFR BLD AUTO: 1 %
CHOLEST SERPL-MCNC: 242 MG/DL
CREAT SERPL-MCNC: 1.02 MG/DL (ref 0.51–0.95)
EGFRCR SERPLBLD CKD-EPI 2021: 59 ML/MIN/1.73M2
EOSINOPHIL # BLD AUTO: 0.2 10E3/UL (ref 0–0.7)
EOSINOPHIL NFR BLD AUTO: 4 %
ERYTHROCYTE [DISTWIDTH] IN BLOOD BY AUTOMATED COUNT: 12.3 % (ref 10–15)
FASTING STATUS PATIENT QL REPORTED: YES
HCT VFR BLD AUTO: 42.2 % (ref 35–47)
HDLC SERPL-MCNC: 76 MG/DL
HGB BLD-MCNC: 14.1 G/DL (ref 11.7–15.7)
IMM GRANULOCYTES # BLD: 0 10E3/UL
IMM GRANULOCYTES NFR BLD: 0 %
LDLC SERPL CALC-MCNC: 144 MG/DL
LYMPHOCYTES # BLD AUTO: 1.4 10E3/UL (ref 0.8–5.3)
LYMPHOCYTES NFR BLD AUTO: 25 %
MCH RBC QN AUTO: 30.2 PG (ref 26.5–33)
MCHC RBC AUTO-ENTMCNC: 33.4 G/DL (ref 31.5–36.5)
MCV RBC AUTO: 90 FL (ref 78–100)
MONOCYTES # BLD AUTO: 0.4 10E3/UL (ref 0–1.3)
MONOCYTES NFR BLD AUTO: 7 %
NEUTROPHILS # BLD AUTO: 3.5 10E3/UL (ref 1.6–8.3)
NEUTROPHILS NFR BLD AUTO: 63 %
NONHDLC SERPL-MCNC: 166 MG/DL
PLATELET # BLD AUTO: 261 10E3/UL (ref 150–450)
RBC # BLD AUTO: 4.67 10E6/UL (ref 3.8–5.2)
TRIGL SERPL-MCNC: 109 MG/DL
WBC # BLD AUTO: 5.6 10E3/UL (ref 4–11)

## 2025-06-17 PROCEDURE — 90480 ADMN SARSCOV2 VAC 1/ONLY CMP: CPT | Performed by: FAMILY MEDICINE

## 2025-06-17 PROCEDURE — 82565 ASSAY OF CREATININE: CPT | Performed by: FAMILY MEDICINE

## 2025-06-17 PROCEDURE — 91320 SARSCV2 VAC 30MCG TRS-SUC IM: CPT | Performed by: FAMILY MEDICINE

## 2025-06-17 PROCEDURE — 36415 COLL VENOUS BLD VENIPUNCTURE: CPT | Performed by: FAMILY MEDICINE

## 2025-06-17 PROCEDURE — 3079F DIAST BP 80-89 MM HG: CPT | Performed by: FAMILY MEDICINE

## 2025-06-17 PROCEDURE — 80061 LIPID PANEL: CPT | Performed by: FAMILY MEDICINE

## 2025-06-17 PROCEDURE — 85025 COMPLETE CBC W/AUTO DIFF WBC: CPT | Performed by: FAMILY MEDICINE

## 2025-06-17 PROCEDURE — 3050F LDL-C >= 130 MG/DL: CPT | Performed by: FAMILY MEDICINE

## 2025-06-17 PROCEDURE — 1126F AMNT PAIN NOTED NONE PRSNT: CPT | Performed by: FAMILY MEDICINE

## 2025-06-17 PROCEDURE — G0439 PPPS, SUBSEQ VISIT: HCPCS | Performed by: FAMILY MEDICINE

## 2025-06-17 PROCEDURE — 3075F SYST BP GE 130 - 139MM HG: CPT | Performed by: FAMILY MEDICINE

## 2025-06-17 RX ORDER — CETIRIZINE HYDROCHLORIDE 10 MG/1
5-10 TABLET ORAL DAILY PRN
COMMUNITY

## 2025-06-17 SDOH — HEALTH STABILITY: PHYSICAL HEALTH: ON AVERAGE, HOW MANY DAYS PER WEEK DO YOU ENGAGE IN MODERATE TO STRENUOUS EXERCISE (LIKE A BRISK WALK)?: 5 DAYS

## 2025-06-17 SDOH — HEALTH STABILITY: PHYSICAL HEALTH: ON AVERAGE, HOW MANY MINUTES DO YOU ENGAGE IN EXERCISE AT THIS LEVEL?: 30 MIN

## 2025-06-17 ASSESSMENT — SOCIAL DETERMINANTS OF HEALTH (SDOH): HOW OFTEN DO YOU GET TOGETHER WITH FRIENDS OR RELATIVES?: ONCE A WEEK

## 2025-06-17 ASSESSMENT — PAIN SCALES - GENERAL: PAINLEVEL_OUTOF10: NO PAIN (0)

## 2025-06-17 NOTE — PATIENT INSTRUCTIONS
At [facility], we strive to deliver an exceptional experience to you, every time we see you. If you receive a survey, please let us know what we are doing well and/or what we could improve upon, as we do value your feedback.  If you have MyChart, you can expect to receive results automatically within 24 hours of their completion.  Your provider will send a note interpreting your results as well.   If you do not have MyChart, you should receive your results in about a week by mail.    Your care team:                            Family Medicine Internal Medicine   MD Eric Cheema, MD Mariana Sandoval, MD Ramiro Diaz, MD Shila Kaye, PA-C Syeda Payne APRBRIAN, ULISES Stevenson, MD Pediatrics   MD Jenn Maddox, MD Josette Ceballos, PAGISSELL Ho APRN CNP   Candice Seymour, MD Lupis Wade, MD Miryam Mayorga, CNP      Same-Day Provider (No follow-up visits)    HERMAN Preston PA-C     Clinic hours: Monday - Thursday 7 am-6 pm; Fridays 7 am-5 pm.   Urgent care: Monday - Friday 10 am- 8 pm; Saturday and Sunday 9 am-5 pm.    Clinic: [phone]       [facility]: Monday - Thursday 8 am - 7 pm; Friday 8 am - 6 pm  [facility]: [phone]     [facility] Scheduling: Monday - Friday 7 am - 7 pm; Saturday 7 am - 3:30 pm  [phone]) [place] : [phone]    Patient Education   Preventive Care Advice   This is general advice given by our system to help you stay healthy. However, your care team may have specific advice just for you. Please talk to your care team about your preventive care needs.  Nutrition  Eat 5 or more servings of fruits and vegetables each day.  Try wheat bread, brown rice and whole grain pasta (instead of white bread, rice, and pasta).  Get enough calcium and vitamin D. Check the label on foods and aim for  100% of the RDA (recommended daily allowance).  Lifestyle  Exercise at least 150 minutes each week  (30 minutes a day, 5 days a week).  Do muscle strengthening activities 2 days a week. These help control your weight and prevent disease.  No smoking.  Wear sunscreen to prevent skin cancer.  Have a dental exam and cleaning every 6 months.  Yearly exams  See your health care team every year to talk about:  Any changes in your health.  Any medicines your care team has prescribed.  Preventive care, family planning, and ways to prevent chronic diseases.  Shots (vaccines)   HPV shots (up to age 26), if you've never had them before.  Hepatitis B shots (up to age 59), if you've never had them before.  COVID-19 shot: Get this shot when it's due.  Flu shot: Get a flu shot every year.  Tetanus shot: Get a tetanus shot every 10 years.  Pneumococcal, hepatitis A, and RSV shots: Ask your care team if you need these based on your risk.  Shingles shot (for age 50 and up)  General health tests  Diabetes screening:  Starting at age 35, Get screened for diabetes at least every 3 years.  If you are younger than age 35, ask your care team if you should be screened for diabetes.  Cholesterol test: At age 39, start having a cholesterol test every 5 years, or more often if advised.  Bone density scan (DEXA): At age 50, ask your care team if you should have this scan for osteoporosis (brittle bones).  Hepatitis C: Get tested at least once in your life.  STIs (sexually transmitted infections)  Before age 24: Ask your care team if you should be screened for STIs.  After age 24: Get screened for STIs if you're at risk. You are at risk for STIs (including HIV) if:  You are sexually active with more than one person.  You don't use condoms every time.  You or a partner was diagnosed with a sexually transmitted infection.  If you are at risk for HIV, ask about PrEP medicine to prevent HIV.  Get tested for HIV at least once in your life, whether  you are at risk for HIV or not.  Cancer screening tests  Cervical cancer screening: If you have a cervix, begin getting regular cervical cancer screening tests starting at age 21.  Breast cancer scan (mammogram): If you've ever had breasts, begin having regular mammograms starting at age 40. This is a scan to check for breast cancer.  Colon cancer screening: It is important to start screening for colon cancer at age 45.  Have a colonoscopy test every 10 years (or more often if you're at risk) Or, ask your provider about stool tests like a FIT test every year or Cologuard test every 3 years.  To learn more about your testing options, visit:   .  For help making a decision, visit:   https://bit.ly/nm33925.  Prostate cancer screening test: If you have a prostate, ask your care team if a prostate cancer screening test (PSA) at age 55 is right for you.  Lung cancer screening: If you are a current or former smoker ages 50 to 80, ask your care team if ongoing lung cancer screenings are right for you.  For informational purposes only. Not to replace the advice of your health care provider. Copyright   2023 [employer]. All rights reserved. Clinically reviewed by the [facility] Transitions Program. GetMyRx 368774 - REV 01/24.

## 2025-06-17 NOTE — PROGRESS NOTES
Preventive Care Visit  [facility]  Nelson Camp MD, Family Medicine  Jun 17, 2025  {Provider  Link to [facility] :271902}    Assessment & Plan     (Z00.00) Encounter for Medicare annual wellness exam  (primary encounter diagnosis)  Comment: ***  Plan: COVID-19 12+ (PFIZER), CBC with platelets and         differential, Lipid panel reflex to direct LDL         Non-fasting, Creatinine        Return in about 1 year (around 6/22/2026) for Medicare annual wellness exam.      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  Checklist reviewing preventive services available has been given to the patient.  Reviewed patient's diet, addressing concerns and/or questions.     Ann Ely is a 70 year old, presenting for the following:  Physical        6/17/2025     8:09 AM   Additional Questions   Roomed by Aster   Accompanied by Self          HPI     Advance Care Planning  {The storyboard will display whether the patient has ACP docs on file. Hover over the Code section in the storyboard to access the ACP documents. :195429}  {(AWV REQUIRED) Advance Care Planning Reviewed:518563}        6/17/2025   General Health   How would you rate your overall physical health? Good   Feel stress (tense, anxious, or unable to sleep) Not at all         6/17/2025   Nutrition   Diet: I don't know         6/17/2025   Exercise   Days per week of moderate/strenous exercise 5 days   Average minutes spent exercising at this level 30 min         6/17/2025    Social Factors   Frequency of gathering with friends or relatives Once a week   Worry food won't last until get money to buy more No   Food not last or not have enough money for food? No   Do you have housing? (Housing is defined as stable permanent housing and does not include staying outside in a car, in a tent, in an abandoned building, in an overnight shelter, or couch-surfing.) Yes   Are you worried about losing your housing? No   Lack of transportation? No   Unable to get utilities (heat,electricity)? No         6/17/2025   Fall Risk   Fallen 2 or more times in the past year? No   Trouble with walking or balance? No          6/17/2025   Activities of Daily Living- Home Safety   Needs help with the following daily activites None of the above   Safety concerns in the home None of the above         6/17/2025   Dental   Dentist two times every year? Yes         6/17/2025   Hearing Screening   Hearing concerns? None of the above         6/17/2025   Driving Risk Screening   Patient/family members have concerns about driving No         6/17/2025   General Alertness/Fatigue Screening   Have you been more tired than usual lately? No         6/17/2025   Urinary Incontinence Screening   Bothered by leaking urine in past 6 months No         Today's PHQ-2 Score:       6/17/2025     7:52 AM   PHQ-2 ( 1999 Pfizer)   Q1: Little interest or pleasure in doing things 0   Q2: Feeling down, depressed or hopeless 0   PHQ-2 Score 0    Q1: Little interest or pleasure in doing things Not at all   Q2: Feeling down, depressed or hopeless Not at all   PHQ-2 Score 0       Patient-reported           6/17/2025   Substance Use   Alcohol more than 3/day or more than 7/wk No   Do you have a current opioid prescription? No   How severe/bad is pain from 1 to 10? 0/10 (No Pain)   Do you use any other substances recreationally? No     Social History     Tobacco Use    Smoking status: Never     Passive exposure: Never    Smokeless tobacco: Never    Vaping Use    Vaping status: Never Used   Substance Use Topics    Alcohol use: Yes     Comment: occassionally    Drug use: No     {Provider  If there are gaps in the social history shown above, please follow the link to update and then refresh the note Link to Social and Substance History :[id number]}      6/24/2024   LAST FHS-7 RESULTS   1st degree relative breast or ovarian cancer Yes   Any relative bilateral breast cancer No   Any male have breast cancer No   Any ONE woman have BOTH breast AND ovarian cancer No   Any woman with breast cancer before 50yrs No   2 or more relatives with breast AND/OR ovarian cancer No   2 or more relatives with breast AND/OR bowel cancer No     {If any of the questions to the FHS7 are answered yes, consider referral for genetic counseling.    Additional indications for genetic referral include personal history of breast or ovarian cancer, genetic mutation in 1st degree relative which increases risk of breast cancer including BRCA1, BRCA2, TERRY, PALB 2, TP53, CHEK2, PTEN, CDH1, STK11 (per ACS) and/or 1st degree relative with history of pancreatic or high-risk prostate cancer (per NCCN):006921}   Mammogram Screening - Mammogram every 1-2 years updated in Health Maintenance based on mutual decision making    ASCVD Risk   The 10-year ASCVD risk score (Cecilia PRYOR, et al., 2019) is: 12.1%    Values used to calculate the score:      Age: 70 years      Sex: Female      Is Non- : No      Diabetic: No      Tobacco smoker: No      Systolic Blood Pressure: 139 mmHg      Is BP treated: No      HDL Cholesterol: 76 mg/dL      Total Cholesterol: 231 mg/dL    {Provider  REQUIRED FOR AWV Use the storyboard to review patient history, after sections have been marked as reviewed, refresh note to capture documentation:449373}  {Provider   REQUIRED AWV use this link to review and update sexual activity history  after section has been marked as reviewed, refresh note to  "capture documentation:767423}  Reviewed and updated as needed this visit by Provider   Tobacco   Meds  Problems  Med Hx  Surg Hx  Fam Hx          Current providers sharing in care for this patient include:  Patient Care Team:  Nelson Camp MD as PCP - General (Family Practice)  Nelson Camp MD as Assigned PCP  Adrian Lagunas MD as MD (Otolaryngology)  Del Cordova MD as MD (Gastroenterology)  Lavon Hernandez PA-C as Physician Assistant (Gastroenterology)  Hussein Pepper MD as Assigned Surgical Provider    The following health maintenance items are reviewed in Epic and correct as of today:  Health Maintenance   Topic Date Due    RSV VACCINE (1 - Risk 60-74 years 1-dose series) Never done    COLORECTAL CANCER SCREENING  07/26/2022    MAMMO SCREENING  06/24/2025    INFLUENZA VACCINE (Season Ended) 09/01/2025    COVID-19 VACCINE (9 - 2024-25 season) 12/17/2025    TSH W/FREE T4 REFLEX  04/21/2026    ANNUAL REVIEW OF HM ORDERS  04/21/2026    MEDICARE ANNUAL WELLNESS VISIT  06/17/2026    FALL RISK ASSESSMENT  06/17/2026    DIABETES SCREENING  04/21/2028    DTAP/TDAP/TD VACCINE (2 - Td or Tdap) 05/25/2028    LIPID  06/03/2029    ADVANCE CARE PLANNING  06/27/2029    DEXA  04/16/2036    HEPATITIS C SCREENING  Completed    PHQ-2 (once per calendar year)  Completed    PNEUMOCOCCAL VACCINE 50+ YEARS  Completed    ZOSTER VACCINE  Completed    HPV VACCINE  Aged Out    MENINGITIS VACCINE  Aged Out         Review of Systems       Objective    Exam  /87 (BP Location: Left arm, Patient Position: Chair, Cuff Size: Adult Regular)   Pulse 61   Temp 97.6  F (36.4  C) (Temporal)   Resp 18   Ht 1.6 m (5' 3\")   Wt 53.5 kg (118 lb)   LMP  (LMP Unknown)   SpO2 100%   BMI 20.90 kg/m     Estimated body mass index is 20.9 kg/m  as calculated from the following:    Height as of this encounter: 1.6 m (5' 3\").    Weight as of this encounter: 53.5 kg (118 lb).    Physical Exam  {Exam Choices " (Optional):667809}        6/17/2025   Mini Cog   Clock Draw Score 2 Normal   3 Item Recall 3 objects recalled   Mini Cog Total Score 5     {A Mini-Cog total score of 0-2 suggests the possibility of dementia, score of 3-5 suggests no dementia:466144}        6/17/2025   Vision Screen   Reason Vision Screen Not Completed Screening Recommend: Patient/Guardian Declined       Signed Electronically by: Nelson Camp MD  {Email feedback regarding this note to [email or web address]   :764139}

## 2025-06-23 ENCOUNTER — RESULTS FOLLOW-UP (OUTPATIENT)
Dept: FAMILY MEDICINE | Facility: CLINIC | Age: 70
End: 2025-06-23

## 2025-06-26 ENCOUNTER — ANCILLARY PROCEDURE (OUTPATIENT)
Dept: MAMMOGRAPHY | Facility: CLINIC | Age: 70
End: 2025-06-26
Payer: MEDICARE

## 2025-06-26 DIAGNOSIS — Z12.31 VISIT FOR SCREENING MAMMOGRAM: ICD-10-CM

## 2025-06-26 PROCEDURE — 77063 BREAST TOMOSYNTHESIS BI: CPT | Mod: GC | Performed by: STUDENT IN AN ORGANIZED HEALTH CARE EDUCATION/TRAINING PROGRAM

## 2025-06-26 PROCEDURE — 77067 SCR MAMMO BI INCL CAD: CPT | Mod: GC | Performed by: STUDENT IN AN ORGANIZED HEALTH CARE EDUCATION/TRAINING PROGRAM

## 2025-07-14 NOTE — PROGRESS NOTES
"History of Present Illness - Jhoana Hernandez is a 69 year old female last seen on 1/27/2025, but has also seen Dr Lrema a few times since then.    To review, for many years she has noted a sense of fullness and itching in the ears. She has tried various OTC drops before, but nothing seems to help.  She has never had ear surgery or chronic ear disease.  However, she notes that there seems to be a routine event every fall and spring where her ears seem more stuffy and full.  On exam, after debriding out the canals, her exam strongly suggested chronic eczematous otitis externa.  I would have liked to have used dermotic, but she has a severe anaphylactic nut allergy and therefore I had her use more home treatments like olive oil.  She tells me that it has seemed to help.  Her ears will get stuffy at times.    Also, she had complaints of some dull aching and fullness around the ears that would come and go.  She wears invisalign braces, and felt that this was temporomandibular disease.  I gave her a home therapy sheet.  And at the 9/30/13 visit, this problem had settled down just fine.  She had returned 4/12/16 due to a recurrence of ear pain.  This started about a month prior and she was diagnosed with otitis media and sinusitis.  Antibiotics were given, and things improved.    With regards to her temporomandibular disease, she does tell me that she notes that when she skips wearing her invisalign brace overnight the ear and facial aches do get better.    She has returned primarily to have her ears checked because of fullness since she ran out of prednisolone drops for the ears, more in the LEFT ear.    The main reason for the May 2022 follow up was her nose and throat feel congested, possibly due to her seasonal allergies, which are again worse this year.  But she also notes that about 2 weeks ago she acutely had pressure and \"burning\" in the head, and assumed it was sinusitis.  She was seen in urgent care, and was " given a Z Pack which helped a bit.  She went back to urgent care and was treated with Augmentin and that gave much more relief.      She takes as needed claritin, Nasonex, and even benadryl when things are really bad. But stopped the medications while this recent episode was happening.  She is still having post nasal drainage, congestion.    She mentions that soon after seeing me in May 2022, her nasal issues cleared up quickly without medication.  She also mentions that she had an episode of eating thin mint girl  cookies and almost had anaphylaxis.  She figured out that canola and palm oil do it to her.  Since totally eliminating those things she is having much less symptoms.    She is back today for ear check and cerumen removal.  However, she updates me that she had to be hospitalized in April 2025.  She was using Nasacort regularly, and was having a constant headache that she assumed was a URI or sinus issue.  But as it got worse and worse, she was eventually worked up.  She had an MRI and CT, and they didn't find anything abnormal.  A recent sinus CT was done on 5/9/2025 and personally reviewed and it was clear.  She tells e that the only thing that eventually helped was Nasacort spray.    Past Medical History -   Patient Active Problem List   Diagnosis    CARDIOVASCULAR SCREENING; LDL GOAL LESS THAN 160    Lactose intolerance    Seasonal allergic rhinitis    Allergy, food    Subclinical hypothyroidism    Osteopenia    Hollenhorst plaque, left eye    Family history of breast cancer    Chronic eczematous otitis externa of both ears       Current Medications -   Current Outpatient Medications:     cetirizine (ZYRTEC) 10 MG tablet, Take 5-10 mg by mouth daily as needed for allergies., Disp: , Rfl:     EPINEPHrine (ANY BX GENERIC EQUIV) 0.3 MG/0.3ML injection 2-pack, Inject 0.3 mLs (0.3 mg) into the muscle as needed for anaphylaxis (or exposure to trigger)., Disp: 2 each, Rfl: 0    Loratadine (CLARITIN PO),  Take 1 tablet by mouth as needed, Disp: , Rfl:     triamcinolone (NASACORT) 55 MCG/ACT nasal aerosol, Spray 2 sprays into both nostrils daily. for allergy prevention., Disp: 16.9 mL, Rfl: 1    Allergies -   Allergies   Allergen Reactions    Docosahexaenoic Acid-Epa Anaphylaxis    Fish Oil Anaphylaxis    Nuts Anaphylaxis    [place] Oil Shortness Of Breath    Shellfish Allergy Anaphylaxis    Aloe Hives    Aloe Vera     Canola Oil [Vegetable Oil] Other (See Comments)     Sneezing    Coconut (Cocos Nucifera) Other (See Comments)    Fish     Fluticasone     Food Hives    Hydrogenated Palm Oil Glycerides Other (See Comments)    Ibuprofen Sodium     Macrodantin [Nitrofuran Derivatives]     Mineral Oil Itching    Peanut (Diagnostic)     Seafood     Sulfa Antibiotics     Yellow Dye     Yellow Dyes (Non-Tartrazine) Itching    Ibuprofen Other (See Comments)     Jittery, Chest pain, SOB, Congestion, Dizziness       Social History -   History     Social History    Marital Status:      Spouse Name: Bruno     Number of Children: 2    Years of Education: N/A     Occupational History     shop      [employer]      Social History Main Topics    Smoking status: Never Smoker     Smokeless tobacco: Never Used    Alcohol Use: Yes      Comment: occassionally    Drug Use: No    Sexual Activity:     Partners: Male     Other Topics Concern    Parent/Sibling W/ Cabg, Mi Or Angioplasty Before 65f 55m? No     Social History Narrative       Family History -   Family History   Problem Relation Age of Onset    Hyperlipidemia Mother     Food Allergy Mother     Thyroid Disease Mother     Cerebrovascular Disease Father     Asthma Father     Asthma Sister     Thyroid Disease Sister     Diabetes Type 2  Sister     Breast Cancer Sister 60    Thyroid Disease Sister     Graves' disease Sister     Thyroid Disease Sister     Thyroid Disease Brother     Leukemia Brother 75        chronic, being monitored    Other - See Comments Son      Other - See Comments Son     Coronary Artery Disease No family hx of     Hypertension No family hx of     Colon Cancer No family hx of     Prostate Cancer No family hx of        Review of Systems - As per HPI and PMHx, otherwise 7 system review of the head and neck negative.    Physical Exam  BP (!) 145/88   Pulse 69   LMP  (LMP Unknown)   SpO2 100%       General - The patient is well nourished and well developed, and appears to have good nutritional status.  Alert and oriented to person and place, answers questions and cooperates with examination appropriately.   Head and Face - Normocephalic and atraumatic, with no gross asymmetry noted of the contour of the facial features.  The facial nerve is intact, with strong symmetric movements.  Voice and Breathing - The patient was breathing comfortably without the use of accessory muscles. There was no wheezing, stridor, or stertor.  The patients voice was clear and strong, and had appropriate pitch and quality.  Eyes - Extraocular movements intact, and the pupils were reactive to light.  Sclera were not icteric or injected, conjunctiva were pink and moist.  Mouth - Examination of the oral cavity showed pink, healthy oral mucosa. No lesions or ulcerations noted.  The tongue was mobile and midline, and the dentition were in good condition.      Cerumen Removal    Physical Exam and Procedure  Ears - On examination of the ears, I found that the  sides had cerumen impaction.  Therefore, I positioned them in the examination chair in a semi-supine position, beginning with the right side.  I used the binocular surgical microscope to perform cerumen removal.  I began by using a cerumen loop to gently lift the edges of the cerumen mass away from the walls of the external canal.  Once I did this, I was able to suction away fragments of wax and debris using suction.  Once the mass was loose enough, the entire plug was pulled from the canal.  The tympanic membrane was intact,  no sign of perforation or middle ear effusion.    I turned my attention to the contralateral side once again using the binocular surgical microscope to perform cerumen removal.  I began by using a cerumen loop to gently lift the edges of the cerumen mass away from the walls of the external canal.  Once I did this, I was able to suction away fragments of wax and debris using suction.  Once the mass was loose enough, the entire plug was pulled from the canal.  The tympanic membrane was intact, no sign of perforation or middle ear effusion.        A/P - Jhoana Hernandez is a 70 year old female  (J01.41) Acute recurrent pansinusitis  (primary encounter diagnosis)  (J30.1) Seasonal allergic rhinitis due to pollen  (H61.23) Bilateral impacted cerumen    The patient has had their cerumen procedurally removed today.  I have discussed ear care at home, including avoiding qtips or any other object placed in the canal.  I have also discussed that over the counter cerumen kits like Debrox or Cerumenex can be useful.    With regards to her reflux medication, no need if she is under control with just diet.    Continue the current allergy management, and the Nasocort for her unusual headache symptoms.  If the headache comes back, let me know and we can try steroid nasalirrigations.

## 2025-07-28 ENCOUNTER — OFFICE VISIT (OUTPATIENT)
Dept: OTOLARYNGOLOGY | Facility: CLINIC | Age: 70
End: 2025-07-28
Payer: MEDICARE

## 2025-07-28 VITALS — DIASTOLIC BLOOD PRESSURE: 88 MMHG | HEART RATE: 69 BPM | OXYGEN SATURATION: 100 % | SYSTOLIC BLOOD PRESSURE: 145 MMHG

## 2025-07-28 DIAGNOSIS — H61.23 BILATERAL IMPACTED CERUMEN: Primary | ICD-10-CM

## 2025-07-28 DIAGNOSIS — J30.1 SEASONAL ALLERGIC RHINITIS DUE TO POLLEN: ICD-10-CM

## 2025-07-28 PROCEDURE — 69210 REMOVE IMPACTED EAR WAX UNI: CPT | Performed by: OTOLARYNGOLOGY

## 2025-07-28 PROCEDURE — 3077F SYST BP >= 140 MM HG: CPT | Performed by: OTOLARYNGOLOGY

## 2025-07-28 PROCEDURE — 99213 OFFICE O/P EST LOW 20 MIN: CPT | Mod: 25 | Performed by: OTOLARYNGOLOGY

## 2025-07-28 PROCEDURE — 3079F DIAST BP 80-89 MM HG: CPT | Performed by: OTOLARYNGOLOGY

## 2025-07-28 NOTE — LETTER
7/28/2025      Jhoana Hernandez  [address] [place] [address]      Dear Colleague,    Thank you for referring your patient, Jhoana Hernandez, to the [facility]. Please see a copy of my visit note below.    History of Present Illness - Jhoana Hernandez is a 69 year old female last seen on 1/27/2025, but has also seen Dr Lerma a few times since then.    To review, for many years she has noted a sense of fullness and itching in the ears. She has tried various OTC drops before, but nothing seems to help.  She has never had ear surgery or chronic ear disease.  However, she notes that there seems to be a routine event every fall and spring where her ears seem more stuffy and full.  On exam, after debriding out the canals, her exam strongly suggested chronic eczematous otitis externa.  I would have liked to have used dermotic, but she has a severe anaphylactic nut allergy and therefore I had her use more home treatments like olive oil.  She tells me that it has seemed to help.  Her ears will get stuffy at times.    Also, she had complaints of some dull aching and fullness around the ears that would come and go.  She wears invisalign braces, and felt that this was temporomandibular disease.  I gave her a home therapy sheet.  And at the 9/30/13 visit, this problem had settled down just fine.  She had returned 4/12/16 due to a recurrence of ear pain.  This started about a month prior and she was diagnosed with otitis media and sinusitis.  Antibiotics were given, and things improved.    With regards to her temporomandibular disease, she does tell me that she notes that when she skips wearing her invisalign brace overnight the ear and facial aches do get better.    She has returned primarily to have her ears checked because of fullness since she ran out of prednisolone drops for the ears, more in the LEFT ear.    The main reason for the May 2022 follow up was her nose and  "throat feel congested, possibly due to her seasonal allergies, which are again worse this year.  But she also notes that about 2 weeks ago she acutely had pressure and \"burning\" in the head, and assumed it was sinusitis.  She was seen in urgent care, and was given a Z Pack which helped a bit.  She went back to urgent care and was treated with Augmentin and that gave much more relief.      She takes as needed claritin, Nasonex, and even benadryl when things are really bad. But stopped the medications while this recent episode was happening.  She is still having post nasal drainage, congestion.    She mentions that soon after seeing me in May 2022, her nasal issues cleared up quickly without medication.  She also mentions that she had an episode of eating thin mint girl  cookies and almost had anaphylaxis.  She figured out that canola and palm oil do it to her.  Since totally eliminating those things she is having much less symptoms.    She is back today for ear check and cerumen removal.  However, she updates me that she had to be hospitalized in April 2025.  She was using Nasacort regularly, and was having a constant headache that she assumed was a URI or sinus issue.  But as it got worse and worse, she was eventually worked up.  She had an MRI and CT, and they didn't find anything abnormal.  A recent sinus CT was done on 5/9/2025 and personally reviewed and it was clear.  She tells e that the only thing that eventually helped was Nasacort spray.    Past Medical History -   Patient Active Problem List   Diagnosis     CARDIOVASCULAR SCREENING; LDL GOAL LESS THAN 160     Lactose intolerance     Seasonal allergic rhinitis     Allergy, food     Subclinical hypothyroidism     Osteopenia     Hollenhorst plaque, left eye     Family history of breast cancer     Chronic eczematous otitis externa of both ears       Current Medications -   Current Outpatient Medications:      cetirizine (ZYRTEC) 10 MG tablet, Take 5-10 mg " by mouth daily as needed for allergies., Disp: , Rfl:      EPINEPHrine (ANY BX GENERIC EQUIV) 0.3 MG/0.3ML injection 2-pack, Inject 0.3 mLs (0.3 mg) into the muscle as needed for anaphylaxis (or exposure to trigger)., Disp: 2 each, Rfl: 0     Loratadine (CLARITIN PO), Take 1 tablet by mouth as needed, Disp: , Rfl:      triamcinolone (NASACORT) 55 MCG/ACT nasal aerosol, Spray 2 sprays into both nostrils daily. for allergy prevention., Disp: 16.9 mL, Rfl: 1    Allergies -   Allergies   Allergen Reactions     Docosahexaenoic Acid-Epa Anaphylaxis     Fish Oil Anaphylaxis     Nuts Anaphylaxis     [place] Oil Shortness Of Breath     Shellfish Allergy Anaphylaxis     Aloe Hives     Aloe Vera      Canola Oil [Vegetable Oil] Other (See Comments)     Sneezing     Coconut (Cocos Nucifera) Other (See Comments)     Fish      Fluticasone      Food Hives     Hydrogenated Palm Oil Glycerides Other (See Comments)     Ibuprofen Sodium      Macrodantin [Nitrofuran Derivatives]      Mineral Oil Itching     Peanut (Diagnostic)      Seafood      Sulfa Antibiotics      Yellow Dye      Yellow Dyes (Non-Tartrazine) Itching     Ibuprofen Other (See Comments)     Jittery, Chest pain, SOB, Congestion, Dizziness       Social History -   History     Social History     Marital Status:      Spouse Name: Bruno     Number of Children: 2     Years of Education: N/A     Occupational History      shop       [employer]      Social History Main Topics     Smoking status: Never Smoker      Smokeless tobacco: Never Used     Alcohol Use: Yes      Comment: occassionally     Drug Use: No     Sexual Activity:     Partners: Male     Other Topics Concern     Parent/Sibling W/ Cabg, Mi Or Angioplasty Before 65f 55m? No     Social History Narrative       Family History -   Family History   Problem Relation Age of Onset     Hyperlipidemia Mother      Food Allergy Mother      Thyroid Disease Mother      Cerebrovascular Disease Father       Asthma Father      Asthma Sister      Thyroid Disease Sister      Diabetes Type 2  Sister      Breast Cancer Sister 60     Thyroid Disease Sister      Graves' disease Sister      Thyroid Disease Sister      Thyroid Disease Brother      Leukemia Brother 75        chronic, being monitored     Other - See Comments Son      Other - See Comments Son      Coronary Artery Disease No family hx of      Hypertension No family hx of      Colon Cancer No family hx of      Prostate Cancer No family hx of        Review of Systems - As per HPI and PMHx, otherwise 7 system review of the head and neck negative.    Physical Exam  BP (!) 145/88   Pulse 69   LMP  (LMP Unknown)   SpO2 100%       General - The patient is well nourished and well developed, and appears to have good nutritional status.  Alert and oriented to person and place, answers questions and cooperates with examination appropriately.   Head and Face - Normocephalic and atraumatic, with no gross asymmetry noted of the contour of the facial features.  The facial nerve is intact, with strong symmetric movements.  Voice and Breathing - The patient was breathing comfortably without the use of accessory muscles. There was no wheezing, stridor, or stertor.  The patients voice was clear and strong, and had appropriate pitch and quality.  Eyes - Extraocular movements intact, and the pupils were reactive to light.  Sclera were not icteric or injected, conjunctiva were pink and moist.  Mouth - Examination of the oral cavity showed pink, healthy oral mucosa. No lesions or ulcerations noted.  The tongue was mobile and midline, and the dentition were in good condition.      Cerumen Removal    Physical Exam and Procedure  Ears - On examination of the ears, I found that the  sides had cerumen impaction.  Therefore, I positioned them in the examination chair in a semi-supine position, beginning with the right side.  I used the binocular surgical microscope to perform cerumen  removal.  I began by using a cerumen loop to gently lift the edges of the cerumen mass away from the walls of the external canal.  Once I did this, I was able to suction away fragments of wax and debris using suction.  Once the mass was loose enough, the entire plug was pulled from the canal.  The tympanic membrane was intact, no sign of perforation or middle ear effusion.    I turned my attention to the contralateral side once again using the binocular surgical microscope to perform cerumen removal.  I began by using a cerumen loop to gently lift the edges of the cerumen mass away from the walls of the external canal.  Once I did this, I was able to suction away fragments of wax and debris using suction.  Once the mass was loose enough, the entire plug was pulled from the canal.  The tympanic membrane was intact, no sign of perforation or middle ear effusion.        A/P Samir Hernandez is a 70 year old female  (J01.41) Acute recurrent pansinusitis  (primary encounter diagnosis)  (J30.1) Seasonal allergic rhinitis due to pollen  (H61.23) Bilateral impacted cerumen    The patient has had their cerumen procedurally removed today.  I have discussed ear care at home, including avoiding qtips or any other object placed in the canal.  I have also discussed that over the counter cerumen kits like Debrox or Cerumenex can be useful.    With regards to her reflux medication, no need if she is under control with just diet.    Continue the current allergy management, and the Nasocort for her unusual headache symptoms.  If the headache comes back, let me know and we can try steroid nasalirrigations.        Again, thank you for allowing me to participate in the care of your patient.        Sincerely,        Hussein Pepper MD    Electronically signed